# Patient Record
Sex: MALE | Race: WHITE | HISPANIC OR LATINO | Employment: OTHER | URBAN - METROPOLITAN AREA
[De-identification: names, ages, dates, MRNs, and addresses within clinical notes are randomized per-mention and may not be internally consistent; named-entity substitution may affect disease eponyms.]

---

## 2017-01-10 ENCOUNTER — GENERIC CONVERSION - ENCOUNTER (OUTPATIENT)
Dept: OTHER | Facility: OTHER | Age: 67
End: 2017-01-10

## 2017-04-03 ENCOUNTER — ALLSCRIPTS OFFICE VISIT (OUTPATIENT)
Dept: OTHER | Facility: OTHER | Age: 67
End: 2017-04-03

## 2017-04-03 DIAGNOSIS — I10 ESSENTIAL (PRIMARY) HYPERTENSION: ICD-10-CM

## 2017-04-03 DIAGNOSIS — Z12.5 ENCOUNTER FOR SCREENING FOR MALIGNANT NEOPLASM OF PROSTATE: ICD-10-CM

## 2017-04-03 DIAGNOSIS — Z13.89 ENCOUNTER FOR SCREENING FOR OTHER DISORDER: ICD-10-CM

## 2017-06-21 ENCOUNTER — ALLSCRIPTS OFFICE VISIT (OUTPATIENT)
Dept: OTHER | Facility: OTHER | Age: 67
End: 2017-06-21

## 2017-06-21 DIAGNOSIS — M25.50 PAIN IN JOINT: ICD-10-CM

## 2017-06-29 ENCOUNTER — APPOINTMENT (OUTPATIENT)
Dept: LAB | Facility: CLINIC | Age: 67
End: 2017-06-29
Payer: COMMERCIAL

## 2017-06-29 ENCOUNTER — TRANSCRIBE ORDERS (OUTPATIENT)
Dept: LAB | Facility: CLINIC | Age: 67
End: 2017-06-29

## 2017-06-29 DIAGNOSIS — I10 ESSENTIAL (PRIMARY) HYPERTENSION: ICD-10-CM

## 2017-06-29 DIAGNOSIS — Z12.5 ENCOUNTER FOR SCREENING FOR MALIGNANT NEOPLASM OF PROSTATE: ICD-10-CM

## 2017-06-29 DIAGNOSIS — M25.50 PAIN IN JOINT, SITE UNSPECIFIED: ICD-10-CM

## 2017-06-29 DIAGNOSIS — M25.50 PAIN IN JOINT: ICD-10-CM

## 2017-06-29 DIAGNOSIS — Z13.89 ENCOUNTER FOR SCREENING FOR OTHER DISORDER: ICD-10-CM

## 2017-06-29 DIAGNOSIS — M25.50 PAIN IN JOINT, SITE UNSPECIFIED: Primary | ICD-10-CM

## 2017-06-29 LAB
ALBUMIN SERPL BCP-MCNC: 3.5 G/DL (ref 3.5–5)
ALP SERPL-CCNC: 67 U/L (ref 46–116)
ALT SERPL W P-5'-P-CCNC: 27 U/L (ref 12–78)
ANION GAP SERPL CALCULATED.3IONS-SCNC: 7 MMOL/L (ref 4–13)
AST SERPL W P-5'-P-CCNC: 20 U/L (ref 5–45)
BACTERIA UR QL AUTO: ABNORMAL /HPF
BASOPHILS # BLD AUTO: 0.02 THOUSANDS/ΜL (ref 0–0.1)
BASOPHILS NFR BLD AUTO: 0 % (ref 0–1)
BILIRUB SERPL-MCNC: 0.61 MG/DL (ref 0.2–1)
BILIRUB UR QL STRIP: NEGATIVE
BUN SERPL-MCNC: 16 MG/DL (ref 5–25)
CALCIUM SERPL-MCNC: 9 MG/DL (ref 8.3–10.1)
CHLORIDE SERPL-SCNC: 108 MMOL/L (ref 100–108)
CHOLEST SERPL-MCNC: 252 MG/DL (ref 50–200)
CLARITY UR: CLEAR
CO2 SERPL-SCNC: 23 MMOL/L (ref 21–32)
COLOR UR: YELLOW
CREAT SERPL-MCNC: 1.33 MG/DL (ref 0.6–1.3)
CRP SERPL QL: 11.9 MG/L
EOSINOPHIL # BLD AUTO: 0.47 THOUSAND/ΜL (ref 0–0.61)
EOSINOPHIL NFR BLD AUTO: 8 % (ref 0–6)
ERYTHROCYTE [DISTWIDTH] IN BLOOD BY AUTOMATED COUNT: 12.5 % (ref 11.6–15.1)
ERYTHROCYTE [SEDIMENTATION RATE] IN BLOOD: 21 MM/HOUR (ref 0–10)
GFR SERPL CREATININE-BSD FRML MDRD: 53.6 ML/MIN/1.73SQ M
GLUCOSE P FAST SERPL-MCNC: 93 MG/DL (ref 65–99)
GLUCOSE UR STRIP-MCNC: NEGATIVE MG/DL
HCT VFR BLD AUTO: 44.5 % (ref 36.5–49.3)
HDLC SERPL-MCNC: 49 MG/DL (ref 40–60)
HGB BLD-MCNC: 15.4 G/DL (ref 12–17)
HGB UR QL STRIP.AUTO: NEGATIVE
HYALINE CASTS #/AREA URNS LPF: ABNORMAL /LPF
KETONES UR STRIP-MCNC: NEGATIVE MG/DL
LDLC SERPL CALC-MCNC: 164 MG/DL (ref 0–100)
LEUKOCYTE ESTERASE UR QL STRIP: ABNORMAL
LYMPHOCYTES # BLD AUTO: 1.56 THOUSANDS/ΜL (ref 0.6–4.47)
LYMPHOCYTES NFR BLD AUTO: 26 % (ref 14–44)
MAGNESIUM SERPL-MCNC: 2.3 MG/DL (ref 1.6–2.6)
MCH RBC QN AUTO: 30.8 PG (ref 26.8–34.3)
MCHC RBC AUTO-ENTMCNC: 34.6 G/DL (ref 31.4–37.4)
MCV RBC AUTO: 89 FL (ref 82–98)
MONOCYTES # BLD AUTO: 0.64 THOUSAND/ΜL (ref 0.17–1.22)
MONOCYTES NFR BLD AUTO: 11 % (ref 4–12)
NEUTROPHILS # BLD AUTO: 3.37 THOUSANDS/ΜL (ref 1.85–7.62)
NEUTS SEG NFR BLD AUTO: 55 % (ref 43–75)
NITRITE UR QL STRIP: NEGATIVE
NON-SQ EPI CELLS URNS QL MICRO: ABNORMAL /HPF
NRBC BLD AUTO-RTO: 0 /100 WBCS
PH UR STRIP.AUTO: 5.5 [PH] (ref 4.5–8)
PLATELET # BLD AUTO: 235 THOUSANDS/UL (ref 149–390)
PMV BLD AUTO: 10.4 FL (ref 8.9–12.7)
POTASSIUM SERPL-SCNC: 3.5 MMOL/L (ref 3.5–5.3)
PROT SERPL-MCNC: 7.7 G/DL (ref 6.4–8.2)
PROT UR STRIP-MCNC: NEGATIVE MG/DL
PSA SERPL-MCNC: 2.2 NG/ML (ref 0–4)
RBC # BLD AUTO: 5 MILLION/UL (ref 3.88–5.62)
RBC #/AREA URNS AUTO: ABNORMAL /HPF
SODIUM SERPL-SCNC: 138 MMOL/L (ref 136–145)
SP GR UR STRIP.AUTO: 1.01 (ref 1–1.03)
TRIGL SERPL-MCNC: 197 MG/DL
TSH SERPL DL<=0.05 MIU/L-ACNC: 0.54 UIU/ML (ref 0.36–3.74)
UROBILINOGEN UR QL STRIP.AUTO: 0.2 E.U./DL
WBC # BLD AUTO: 6.07 THOUSAND/UL (ref 4.31–10.16)
WBC #/AREA URNS AUTO: ABNORMAL /HPF

## 2017-06-29 PROCEDURE — 83735 ASSAY OF MAGNESIUM: CPT

## 2017-06-29 PROCEDURE — 84443 ASSAY THYROID STIM HORMONE: CPT

## 2017-06-29 PROCEDURE — 80061 LIPID PANEL: CPT

## 2017-06-29 PROCEDURE — 86140 C-REACTIVE PROTEIN: CPT

## 2017-06-29 PROCEDURE — G0103 PSA SCREENING: HCPCS

## 2017-06-29 PROCEDURE — 85652 RBC SED RATE AUTOMATED: CPT

## 2017-06-29 PROCEDURE — 81001 URINALYSIS AUTO W/SCOPE: CPT

## 2017-06-29 PROCEDURE — 86430 RHEUMATOID FACTOR TEST QUAL: CPT

## 2017-06-29 PROCEDURE — 86038 ANTINUCLEAR ANTIBODIES: CPT

## 2017-06-29 PROCEDURE — 85025 COMPLETE CBC W/AUTO DIFF WBC: CPT

## 2017-06-29 PROCEDURE — 36415 COLL VENOUS BLD VENIPUNCTURE: CPT

## 2017-06-29 PROCEDURE — 80053 COMPREHEN METABOLIC PANEL: CPT

## 2017-06-30 LAB
RHEUMATOID FACT SER QL LA: NEGATIVE
RYE IGE QN: NEGATIVE

## 2017-07-05 ENCOUNTER — ALLSCRIPTS OFFICE VISIT (OUTPATIENT)
Dept: OTHER | Facility: OTHER | Age: 67
End: 2017-07-05

## 2017-10-04 ENCOUNTER — GENERIC CONVERSION - ENCOUNTER (OUTPATIENT)
Dept: OTHER | Facility: OTHER | Age: 67
End: 2017-10-04

## 2017-10-23 ENCOUNTER — GENERIC CONVERSION - ENCOUNTER (OUTPATIENT)
Dept: OTHER | Facility: OTHER | Age: 67
End: 2017-10-23

## 2017-11-03 ENCOUNTER — ALLSCRIPTS OFFICE VISIT (OUTPATIENT)
Dept: OTHER | Facility: OTHER | Age: 67
End: 2017-11-03

## 2017-11-03 DIAGNOSIS — G62.9 POLYNEUROPATHY: ICD-10-CM

## 2017-11-04 NOTE — CONSULTS
Assessment  1  Restless leg syndrome (333 94) (G25 81)   2  Neuropathy, peripheral (356 9) (G62 9)   3  Tinnitus of both ears (388 30) (H93 13)   4  Memory difficulty (780 93) (R41 3)    Plan  Neuropathy, peripheral    · (1) FOLATE; Status:Active; Requested EHS:[id number];    Perform:[facility]; TMO:[id number]; Ordered;For:Neuropathy, peripheral; Ordered By:Mandeep Piper;   · (1) LYME ANTIBODY PROFILE W/REFLEX TO WESTERN BLOT; Status:Active; Requested  AAZ:[id number];    Perform:[facility]; JLA:52QNK8355; Ordered;For:Neuropathy, peripheral; Ordered By:Mandeep Piper;   · (1) VITAMIN B12; Status:Active; Requested JRW:[id number];    Perform:[facility]; PIR:56PTG0389; Ordered;For:Neuropathy, peripheral; Ordered By:Caitlin Piper; Discussion/Summary  Discussion Summary:   Suspect Berta Lugo has restless leg syndrome  I have recommended a trial of gabapentin 300 mg at bedtime  We discussed the potential adverse effects of the medication  He does have findings consistent with peripheral neuropathy and probable carpal tunnel syndrome  Will obtain EMG of the upper extremities as well as the right lower extremity and well obtain blood work to rule out treatable etiologies (he does have MGUS which can cause peripheral neuropathy)  He may try Lipoflavinoid for his denied this  He is presently being treated with Aricept for his memory issues  I will see him back in follow up on these are completed  Chief Complaint  Chief Complaint Free Text Note Form: Patient is a 79year old right handed gentleman referred by Dr Shai Chu for Bilateral leg pain  History of Present Illness  HPI: Michael Joiner presents today with the above complaints  He states that for several years he has had symptoms of pain in his lower extremities below the knees  He states it is sharp type of pain that is typically most severe at nighttime while he is trying to sleep   He states that he constantly has to keep his legs moving to minimize the pain  He states he is aware but to some degree during the day but because he is active he does not really notice it as much  He states that has gotten worse over time  He states he has not been evaluated about 4 it before and is currently not on any medication for it  He denies any pain in the proximal lower extremities or pain in his back  He states in addition he notes numbness and tingling predominantly in his hands  He does not notice any specific position or activity that seems to aggravate it  He denies any pain in his neck or pain radiating from his neck into his arms  He also notices a buzzing in his ears bilaterally  Review of Systems  Neurological ROS:   Constitutional: recent weight gain-- and-- fatigue  HEENT: blurred vision,-- eye pain-- and-- hoarseness  Cardiovascular: chest pain or pressure-- and-- rapid or irregular heart rate  Respiratory: shortness of breath with or without exertion  Gastrointestinal: changes in bowel habits  Genitourinary:  no incontinence, no feelings of urinary urgency, no increase in frequency, no urinary hesitancy, no dysuria, no hematuria  Musculoskeletal: arthralgias,-- myalgias-- and-- immobility or loss of function  Integumentary  no masses, no rash, no skin lesions, no livedo reticularis  Psychiatric: depression-- and-- mood swings  Endocrine  no unusual weight loss or gain, no excessive urination, no excessive thirst, no hair loss or gain, no hot or cold intolerance, no menstrual period change or irregularity, no loss of sexual ability or drive, no erection difficulty, no nipple discharge  Hematologic/Lymphatic:  no unusual bleeding, no tendency for easy bruising, no clotting skin or lumps  Neurological General: headache,-- trouble falling asleep,-- snoring-- and-- waking up at night  Neurological Mental Status: memory problems     Neurological Cranial Nerves:  no blurry or double vision, no loss of vision, no face drooping, no facial numbness or weakness, no taste or smell loss/changes, no hearing loss or ringing, no vertigo or dizziness, no dysphagia, no slurred speech  Neurological Motor findings include: cramping(pre/post exercise)  Neurological Coordination:  no unsteadiness, no vertigo or dizziness, no clumsiness, no problems reaching for objects  Neurological Sensory:  no numbness, no pain, no tingling, does not fall when eyes closed or taking a shower  Neurological Gait:  no difficulty walking, not falling to one side, no sensation of being pushed, has not had falls  ROS Reviewed:   ROS reviewed  Active Problems  1  Anxiety associated with depression (300 4) (F41 8)   2  Chest wall pain (786 52) (R07 89)   3  Chronic obstructive pulmonary disease (496) (J44 9)   4  Congestive heart failure (428 0) (I50 9)   5  Coronary artery disease involving native coronary artery of native heart with other form of   angina pectoris (414 01,413 9) (I25 118)   6  Dyspnea on effort (786 09) (R06 09)   7  Flu vaccine need (V04 81) (Z23)   8  High cholesterol (272 0) (E78 00)   9  Hypertension (401 9) (I10)   10  Insomnia (780 52) (G47 00)   11  Joint pain (719 40) (M25 50)   12  Lumbosacral spinal stenosis (724 02) (M48 07)   13  MGUS (monoclonal gammopathy of unknown significance) (273 1) (D47 2)   14  No advance directives (V49 89) (Z78 9)   15  History of Nonischemic cardiomyopathy (425 4) (I42 8)   16  Obesity (278 00) (E66 9)   17  Pain in both upper arms (729 5) (M79 621,M79 622)   18  Prostate cancer screening (V76 44) (Z12 5)   19  Restless leg syndrome (333 94) (G25 81)   20  Screening for colon cancer (V76 51) (Z12 11)   21  Screening for genitourinary condition (V81 6) (Z13 89)    Past Medical History  1  History of Nonischemic cardiomyopathy (425 4) (I42 8)  Active Problems And Past Medical History Reviewed: The active problems and past medical history were reviewed and updated today        Surgical History  Surgical History Reviewed: The surgical history was reviewed and updated today  Family History  Family History Reviewed: The family history was reviewed and updated today  Social History   · Alcohol drinker   · Disabled   · Former smoker (Y15 44) (M25 604)   · No advance directives (V49 89) (Z78 9)   · No drug use  Social History Reviewed: The social history was reviewed and updated today  Current Meds   1  AmLODIPine Besylate 10 MG Oral Tablet; TAKE ONE TABLET BY MOUTH ONCE DAILY; Therapy: 25Nej9898 to (Evaluate:96Vhi2074)  Requested for: 72Gbd9284; Last   Rx:40Ahs4255 Ordered   2  AmLODIPine Besylate 5 MG Oral Tablet; take 1 tablet daily for blood pressure; Therapy: 17CCP0368 to (Evaluate:81Vih8671)  Requested for: 07Aug2017; Last   Rx:68Sle9011 Ordered   3  Aspirin 81 MG TABS; TAKE 1 TABLET DAILY; Therapy: 90NEG3830 to (Evaluate:21Nov2015); Last Rx:33Qbn2717 Ordered   4  Carvedilol 12 5 MG Oral Tablet; take one tablet by mouth twice daily; Therapy: 30BGN0966 to (Evaluate:02Apr2018)  Requested for: 07Apr2017; Last   Rx:07Apr2017 Ordered   5  Citalopram Hydrobromide 20 MG Oral Tablet; TAKE ONE TABLET BY MOUTH ONCE   DAILY; Therapy: 92OPV6202 to (Evaluate:63Zan9735)  Requested for: 12Jun2017; Last   Rx:12Jun2017 Ordered   6  Donepezil HCl - 10 MG Oral Tablet; TAKE 1 TABLET DAILY AS DIRECTED; Therapy: 92UUC4099 to (Evaluate:33Zuo2382)  Requested for: 25Oct2017; Last   Rx:25Oct2017 Ordered   7  Fluticasone Propionate 50 MCG/ACT Nasal Suspension; USE 2 SPRAYS IN EACH   NOSTRIL ONCE DAILY; Therapy: 89QNB8218 to (Last YR:62CPL0173)  Requested for: 31XGC4177 Ordered   8  Furosemide 20 MG Oral Tablet; TAKE 1 TABLET DAILY AS DIRECTED; Therapy: 07CRM9749 to (Evaluate:82Ugh0204)  Requested for: 38Wtm5953; Last   Rx:09Afr7406 Ordered   9  Isosorbide Mononitrate ER 30 MG Oral Tablet Extended Release 24 Hour; TAKE 1   TABLET DAILY;    Therapy: 51HTO8605 to (Evaluate:36Omf6647)  Requested for: 74QKJ4278; Last   Rx:04Oct2017 Ordered   10  Lisinopril 20 MG Oral Tablet; TAKE ONE TABLET BY MOUTH ONCE DAILY; Therapy: 46UET9733 to (Anatoly Barcenas)  Requested for: 34Caz5809; Last    Rx:25Ezh3789 Ordered   11  Montelukast Sodium 10 MG Oral Tablet; take one tablet by mouth daily; Therapy: 72AIW4592 to (Evaluate:86Mvw8535)  Requested for: 03ASZ7196; Last    Rx:15Ruq8403 Ordered   12  Nitrostat 0 4 MG Sublingual Tablet Sublingual; DISSOLVE 1 TABLET UNDER THE    TONGUE AS NEEDED FOR CHEST PAIN;    Therapy: (Holland Boeck) to Recorded   13  Potassium Chloride ER 10 MEQ Oral Tablet Extended Release; TAKE 1 TABLET TWICE    DAILY  Requested for: 76JUK7670; Last Rx:98Qav1831 Ordered   14  Pravastatin Sodium 40 MG Oral Tablet; TAKE 1 TABLET DAILY AT BEDTIME; Last    Rx:63Pah3971 Ordered   15  ProAir  (90 Base) MCG/ACT Inhalation Aerosol Solution; INHALE 1 TO 2 PUFFS    EVERY 4 TO 6 HOURS AS NEEDED Recorded   16  ROPINIRole HCl - 0 5 MG Oral Tablet; 1 tab at hs;    Therapy: 39FOT4902 to (Last YO:55ZOO4798)  Requested for: 80TFB0522 Ordered  Medication List Reviewed: The medication list was reviewed and updated today  Allergies  1  No Known Drug Allergies  2  Seasonal    Vitals  Signs   Recorded: 97UCZ8542 08:20AM   Heart Rate: 70  Systolic: 954, LUE, Sitting  Diastolic: 80, LUE, Sitting  Height: 5 ft 5 5 in  Weight: 201 lb 2 oz  BMI Calculated: 32 96  BSA Calculated: 1 99  Pain Scale: 7    Physical Exam    GENERAL:  Cooperative in no acute distress  Well-developed and well-nourished    HEAD and NECK   Head is atraumatic normocephalic with no lesions or masses  Neck is supple with full range of motion    CARDIOVASCULAR  Carotid Arteries-no carotid bruits  NEUROLOGIC:  Mental Status-the patient is awake alert and oriented without aphasia or apraxia  Was able to spell world backwards correctly    Was able to recall 3 of 3 objects immediately and 1 of 3 objects after a few minutes  Cranial Nerves: Visual fields are full to confrontation  Discs are flat  Extraocular movements are full without nystagmus  Pupils are 2-1/2 mm and reactive  Face is symmetrical to light touch  Movements of facial expression move symmetrically  Hearing is normal to finger rub bilaterally  Soft palate lifts symmetrically  Shoulder shrug is symmetrical  Tongue is midline without atrophy  Motor: No drift is noted on arm extension  Strength is full in the upper and lower extremities with normal bulk and tone  Sensory:  Diminished temperature and vibratory sensation in the distal lower extremities bilaterally  Cortical function is intact  Coordination: Finger to nose testing is performed accurately  Romberg is negative  Gait reveals a normal base with symmetrical arm swing  Tandem walk is normal   Reflexes:  Trace to 1 in the biceps, triceps, brachioradialis and knee jerk regions, absent at the ankles Toes are downgoing         Future Appointments    Date/Time Provider Specialty Site   03/15/2018 11:15 AM KADI Wilcox  [address] [facility]   12/14/2017 09:05 KADI Tucker   Cardiology [address]     Signatures   Electronically signed by : Marylee Artist, MD; Nov  3 2017  8:46AM EST                       (Author)

## 2018-01-11 NOTE — RESULT NOTES
Verified Results  ECHO COMPLETE WITH CONTRAST IF INDICATED 2016 09:18AM Carlton Teague Order Number: [id number]    - Patient Instructions: To schedule this appointment, please contact Central Scheduling at [phone]  For [facility], please call [phone]  Test Name Result Flag Reference   ECHO COMPLETE WITH CONTRAST IF INDICATED (Report)     [facility], [address] [place] [address]   [phone]     Transthoracic Echocardiogram   Limited 2D, M-mode, and Color Doppler     Study date: 2016     Patient: Jaquelin Reyes   MR number: [id number]   Account number: [de-identified]   : 1950   Age: 77 years   Gender: Male   Status: Outpatient   Location: [facility]   Height: 65 in   Weight: 203 lb   BP: 152/ 86 mmHg     Indications: Dyspnea  Diagnoses: R06 09 - Other forms of dyspnea     Sonographer: Cope RCS   Primary Physician: Nighat Barksdale MD   Referring Physician: Ramírez Arcos MD   Group: [facility]   Interpreting Physician: Ramírez Arcos MD     SUMMARY     LEFT VENTRICLE:   Ejection fraction was estimated to be 55 %  Although no diagnostic regional wall motion abnormality was identified, this   possibility cannot be completely excluded on the basis of this study  There was   an increased relative contribution of atrial contraction to ventricular filling  MITRAL VALVE:   There was trace regurgitation  TRICUSPID VALVE:   There was trace regurgitation  PULMONIC VALVE:   There was trace regurgitation  HISTORY: PRIOR HISTORY: Former smoker  Cardiomyopathy  Risk factors:   hypertension and medication-treated hypercholesterolemia  Chronic lung disease  PROCEDURE: The study was performed in the [address] [place] [address]  This   was a routine study  The transthoracic approach was used  The study included   limited 2D imaging, M-mode, and color Doppler   The heart rate was 56 bpm, at   the start of the study  Images were obtained from the parasternal, apical,   subcostal, and suprasternal notch acoustic windows  Image quality was adequate  LEFT VENTRICLE: Size was normal  Ejection fraction was estimated to be 55 %  Although no diagnostic regional wall motion abnormality was identified, this   possibility cannot be completely excluded on the basis of this study  DOPPLER:   There was an increased relative contribution of atrial contraction to   ventricular filling  RIGHT VENTRICLE: The size was normal  Systolic function was normal  Wall   thickness was normal      LEFT ATRIUM: Size was normal      RIGHT ATRIUM: Size was normal      MITRAL VALVE: Valve structure was normal  There was normal leaflet separation  DOPPLER: The transmitral velocity was within the normal range  There was no   evidence for stenosis  There was trace regurgitation  AORTIC VALVE: The valve was trileaflet  Leaflets exhibited mild calcification  DOPPLER: There was no evidence for stenosis  TRICUSPID VALVE: The valve structure was normal  There was normal leaflet   separation  DOPPLER: The transtricuspid velocity was within the normal range  There was no evidence for stenosis  There was trace regurgitation  PULMONIC VALVE: Leaflets exhibited normal thickness, no calcification, and   normal cuspal separation  DOPPLER: The transpulmonic velocity was within the   normal range  There was trace regurgitation  PERICARDIUM: There was no pericardial effusion  The pericardium was normal in   appearance  AORTA: The root exhibited normal size  SYSTEM MEASUREMENT TABLES     Apical four chamber   4 chamber Left Atrium Volume Index; Planimetry; End Systole; Apical four   chamber;: 13 3 cm2 Left Ventricular Diastolic Area; Method of Disks, Single   Plane; End Diastole; Apical four chamber;: 29 95 cm2 Left Ventricular Ejection   Fraction; Method of Disks, Single Plane;  Apical four chamber;: 71 9 % Left   Ventricular systolic Area; Method of Disks, Single Plane; End Systole; Apical   four chamber;: 13 66 cm2 Right Atrium Systolic Area; Planimetry; End Systole; Apical four chamber;: 12 76 cm2 Right Ventricular Internal Diastolic Dimension; End Diastole; Apical four chamber;: 27 9 mm   TAPSE: 21 7 mm     Apical two chamber   Left Ventricular Diastolic Area; Method of Disks, Single Plane; End Diastole; Apical two chamber;: 33 cm2 Left Ventricular Ejection Fraction; Method of   Disks, Single Plane; Apical two chamber;: 52 8 % Left Ventricular systolic   Area; Method of Disks, Single Plane; End Systole; Apical two chamber;: 19 73 cm2     Unspecified Scan Mode   Aortic Root Diameter; End Systole;: 30 7 mm   Aortic valve Area; Continuity Equation by Velocity Time Integral; Systole;:   1 68 cm2   Cardiovascular Orifice Diameter; End Systole;: 23 4 mm   Gradient Pressure, Peak; Simplified Bernoulli; Antegrade Flow; Systole;: 15 5   mm[Hg] Gradient pressure, average; Simplified Bernoulli; Antegrade Flow;   Systole;: 7 4 mm[Hg]   Left Atrium to Aortic Root Ratio;: 1 32   Left atrial diameter; End Diastole;: 40 5 mm   Cardiac Output; Method of Disks, Biplane; Systole;: 4 29 L/min   Interventricular Septum Diastolic Thickness; Teichholz; End Diastole;: 8 3 mm   LV_LDiff_d_BPMOD; End Diastole;: -6 6 %   LV_LDiff_s_BPMOD; End Systole;: -6 %   Left Ventricle Internal End Diastolic Dimension; Teichholz;: 53 9 mm   Left Ventricle Internal Systolic Dimension; Teichholz; End Systole;: 32 9 mm   Left Ventricle Posterior Wall Diastolic Thickness; Teichholz; End Diastole;:   9 2 mm   Left Ventricular Ejection Fraction; Method of Disks, Biplane;: 63 7 %   Left Ventricular Ejection Fraction; Teichholz;: 56 3 %   Left Ventricular End Diastolic Volume; Method of Disks, Biplane;: 108 6 ml   Left Ventricular End Systolic Volume; Method of Disks, Biplane;: 39 4 ml   Mean Gradient; Simplified Bernoulli;  Antegrade Flow; Systole;: 1 3 mm[Hg]   Peak Gradient; Simplified Bernoulli;  Antegrade Flow; Systole;: 2 mm[Hg]   Stroke volume; Method of Disks, Biplane; Systole;: 69 2 ml   Mitral Valve Area; Area by Pressure Half-Time; Systole;: 3 61 cm2   Mitral Valve E to A Ratio; Systole;: 0 56   Pressure half time; Diastole;: 0 06 s     [address] Accredited [facility]     Prepared and electronically signed by     Giovanni Spivey MD   Signed [id number] 12:26:19

## 2018-01-12 VITALS
WEIGHT: 207.13 LBS | DIASTOLIC BLOOD PRESSURE: 84 MMHG | SYSTOLIC BLOOD PRESSURE: 154 MMHG | HEART RATE: 77 BPM | OXYGEN SATURATION: 95 %

## 2018-01-13 VITALS
OXYGEN SATURATION: 94 % | DIASTOLIC BLOOD PRESSURE: 96 MMHG | HEIGHT: 66 IN | HEART RATE: 83 BPM | BODY MASS INDEX: 32.54 KG/M2 | WEIGHT: 202.5 LBS | SYSTOLIC BLOOD PRESSURE: 158 MMHG

## 2018-01-13 VITALS
HEIGHT: 66 IN | SYSTOLIC BLOOD PRESSURE: 126 MMHG | HEART RATE: 66 BPM | BODY MASS INDEX: 32.47 KG/M2 | WEIGHT: 202 LBS | DIASTOLIC BLOOD PRESSURE: 78 MMHG | OXYGEN SATURATION: 96 %

## 2018-01-13 VITALS
BODY MASS INDEX: 32.32 KG/M2 | DIASTOLIC BLOOD PRESSURE: 80 MMHG | HEART RATE: 70 BPM | SYSTOLIC BLOOD PRESSURE: 144 MMHG | WEIGHT: 201.13 LBS | HEIGHT: 66 IN

## 2018-01-22 VITALS — TEMPERATURE: 97.6 F

## 2018-02-16 RX ORDER — AMLODIPINE BESYLATE 5 MG/1
1 TABLET ORAL DAILY
COMMUNITY
Start: 2015-10-19 | End: 2018-10-06 | Stop reason: SDUPTHER

## 2018-02-16 RX ORDER — CITALOPRAM 20 MG/1
1 TABLET ORAL DAILY
COMMUNITY
Start: 2017-06-12 | End: 2018-11-07 | Stop reason: SDUPTHER

## 2018-02-16 RX ORDER — POTASSIUM CHLORIDE 750 MG/1
1 TABLET, FILM COATED, EXTENDED RELEASE ORAL 2 TIMES DAILY
COMMUNITY
End: 2019-01-16 | Stop reason: SDUPTHER

## 2018-02-16 RX ORDER — FUROSEMIDE 20 MG/1
1 TABLET ORAL DAILY
COMMUNITY
Start: 2017-06-05 | End: 2021-04-05 | Stop reason: SDUPTHER

## 2018-02-16 RX ORDER — CARVEDILOL 12.5 MG/1
1 TABLET ORAL 2 TIMES DAILY
COMMUNITY
Start: 2016-02-16 | End: 2018-11-15 | Stop reason: SDUPTHER

## 2018-02-16 RX ORDER — MONTELUKAST SODIUM 10 MG/1
1 TABLET ORAL DAILY
COMMUNITY
Start: 2015-10-22 | End: 2018-11-07 | Stop reason: SDUPTHER

## 2018-02-16 RX ORDER — NITROGLYCERIN 0.4 MG/1
1 TABLET SUBLINGUAL
COMMUNITY
End: 2021-04-05 | Stop reason: SDUPTHER

## 2018-02-16 RX ORDER — FLUTICASONE PROPIONATE 50 MCG
2 SPRAY, SUSPENSION (ML) NASAL DAILY
COMMUNITY
Start: 2016-05-20 | End: 2018-11-07 | Stop reason: SDUPTHER

## 2018-02-16 RX ORDER — LISINOPRIL 20 MG/1
1 TABLET ORAL DAILY
COMMUNITY
Start: 2015-10-19 | End: 2018-06-19 | Stop reason: SDUPTHER

## 2018-02-16 RX ORDER — PRAVASTATIN SODIUM 40 MG
1 TABLET ORAL
COMMUNITY
End: 2018-03-17

## 2018-02-16 RX ORDER — DONEPEZIL HYDROCHLORIDE 10 MG/1
1 TABLET, FILM COATED ORAL DAILY
COMMUNITY
Start: 2015-10-29 | End: 2020-03-30

## 2018-02-16 RX ORDER — AMLODIPINE BESYLATE 10 MG/1
1 TABLET ORAL DAILY
COMMUNITY
Start: 2017-08-07 | End: 2018-02-17 | Stop reason: SDUPTHER

## 2018-02-16 RX ORDER — ALBUTEROL SULFATE 90 UG/1
1-2 AEROSOL, METERED RESPIRATORY (INHALATION)
COMMUNITY
End: 2018-11-07 | Stop reason: SDUPTHER

## 2018-02-16 RX ORDER — ISOSORBIDE MONONITRATE 30 MG/1
1 TABLET, EXTENDED RELEASE ORAL DAILY
COMMUNITY
Start: 2017-10-04 | End: 2020-03-04 | Stop reason: ALTCHOICE

## 2018-02-16 RX ORDER — ROPINIROLE 0.5 MG/1
1 TABLET, FILM COATED ORAL
COMMUNITY
Start: 2017-10-26 | End: 2018-05-21 | Stop reason: SDUPTHER

## 2018-02-17 ENCOUNTER — OFFICE VISIT (OUTPATIENT)
Dept: INTERNAL MEDICINE CLINIC | Facility: CLINIC | Age: 68
End: 2018-02-17
Payer: MEDICARE

## 2018-02-17 ENCOUNTER — APPOINTMENT (OUTPATIENT)
Dept: LAB | Facility: CLINIC | Age: 68
End: 2018-02-17
Payer: MEDICARE

## 2018-02-17 VITALS
HEIGHT: 65 IN | BODY MASS INDEX: 32.82 KG/M2 | TEMPERATURE: 97.1 F | SYSTOLIC BLOOD PRESSURE: 150 MMHG | DIASTOLIC BLOOD PRESSURE: 90 MMHG | HEART RATE: 88 BPM | WEIGHT: 197 LBS

## 2018-02-17 DIAGNOSIS — R05.9 COUGH: ICD-10-CM

## 2018-02-17 DIAGNOSIS — M79.10 MYALGIA: Primary | ICD-10-CM

## 2018-02-17 DIAGNOSIS — M79.10 MYALGIA: ICD-10-CM

## 2018-02-17 LAB
ALBUMIN SERPL BCP-MCNC: 3.2 G/DL (ref 3.5–5)
ALP SERPL-CCNC: 74 U/L (ref 46–116)
ALT SERPL W P-5'-P-CCNC: 39 U/L (ref 12–78)
ANION GAP SERPL CALCULATED.3IONS-SCNC: 6 MMOL/L (ref 4–13)
AST SERPL W P-5'-P-CCNC: 26 U/L (ref 5–45)
BACTERIA UR QL AUTO: ABNORMAL /HPF
BASOPHILS # BLD AUTO: 0.02 THOUSANDS/ΜL (ref 0–0.1)
BASOPHILS NFR BLD AUTO: 0 % (ref 0–1)
BILIRUB SERPL-MCNC: 0.72 MG/DL (ref 0.2–1)
BILIRUB UR QL STRIP: NEGATIVE
BUN SERPL-MCNC: 23 MG/DL (ref 5–25)
CALCIUM SERPL-MCNC: 8.6 MG/DL (ref 8.3–10.1)
CHLORIDE SERPL-SCNC: 110 MMOL/L (ref 100–108)
CHOLEST SERPL-MCNC: 258 MG/DL (ref 50–200)
CLARITY UR: CLEAR
CO2 SERPL-SCNC: 25 MMOL/L (ref 21–32)
COLOR UR: YELLOW
CREAT SERPL-MCNC: 1.18 MG/DL (ref 0.6–1.3)
EOSINOPHIL # BLD AUTO: 0.76 THOUSAND/ΜL (ref 0–0.61)
EOSINOPHIL NFR BLD AUTO: 11 % (ref 0–6)
ERYTHROCYTE [DISTWIDTH] IN BLOOD BY AUTOMATED COUNT: 12.8 % (ref 11.6–15.1)
ERYTHROCYTE [SEDIMENTATION RATE] IN BLOOD: 32 MM/HOUR (ref 0–10)
EST. AVERAGE GLUCOSE BLD GHB EST-MCNC: 114 MG/DL
GFR SERPL CREATININE-BSD FRML MDRD: 63 ML/MIN/1.73SQ M
GLUCOSE P FAST SERPL-MCNC: 98 MG/DL (ref 65–99)
GLUCOSE UR STRIP-MCNC: NEGATIVE MG/DL
HBA1C MFR BLD: 5.6 % (ref 4.2–6.3)
HCT VFR BLD AUTO: 45.9 % (ref 36.5–49.3)
HDLC SERPL-MCNC: 47 MG/DL (ref 40–60)
HGB BLD-MCNC: 15.7 G/DL (ref 12–17)
HGB UR QL STRIP.AUTO: NEGATIVE
HYALINE CASTS #/AREA URNS LPF: ABNORMAL /LPF
KETONES UR STRIP-MCNC: NEGATIVE MG/DL
LDLC SERPL CALC-MCNC: 159 MG/DL (ref 0–100)
LEUKOCYTE ESTERASE UR QL STRIP: NEGATIVE
LYMPHOCYTES # BLD AUTO: 1.73 THOUSANDS/ΜL (ref 0.6–4.47)
LYMPHOCYTES NFR BLD AUTO: 25 % (ref 14–44)
MCH RBC QN AUTO: 30.1 PG (ref 26.8–34.3)
MCHC RBC AUTO-ENTMCNC: 34.2 G/DL (ref 31.4–37.4)
MCV RBC AUTO: 88 FL (ref 82–98)
MONOCYTES # BLD AUTO: 0.79 THOUSAND/ΜL (ref 0.17–1.22)
MONOCYTES NFR BLD AUTO: 12 % (ref 4–12)
NEUTROPHILS # BLD AUTO: 3.57 THOUSANDS/ΜL (ref 1.85–7.62)
NEUTS SEG NFR BLD AUTO: 52 % (ref 43–75)
NITRITE UR QL STRIP: NEGATIVE
NON-SQ EPI CELLS URNS QL MICRO: ABNORMAL /HPF
NRBC BLD AUTO-RTO: 0 /100 WBCS
PH UR STRIP.AUTO: 5.5 [PH] (ref 4.5–8)
PLATELET # BLD AUTO: 285 THOUSANDS/UL (ref 149–390)
PMV BLD AUTO: 10.4 FL (ref 8.9–12.7)
POTASSIUM SERPL-SCNC: 3.9 MMOL/L (ref 3.5–5.3)
PROT SERPL-MCNC: 7.8 G/DL (ref 6.4–8.2)
PROT UR STRIP-MCNC: ABNORMAL MG/DL
RBC # BLD AUTO: 5.21 MILLION/UL (ref 3.88–5.62)
RBC #/AREA URNS AUTO: ABNORMAL /HPF
SODIUM SERPL-SCNC: 141 MMOL/L (ref 136–145)
SP GR UR STRIP.AUTO: 1.03 (ref 1–1.03)
TRIGL SERPL-MCNC: 260 MG/DL
TSH SERPL DL<=0.05 MIU/L-ACNC: 0.84 UIU/ML (ref 0.36–3.74)
UROBILINOGEN UR QL STRIP.AUTO: 0.2 E.U./DL
WBC # BLD AUTO: 6.88 THOUSAND/UL (ref 4.31–10.16)
WBC #/AREA URNS AUTO: ABNORMAL /HPF

## 2018-02-17 PROCEDURE — 80061 LIPID PANEL: CPT

## 2018-02-17 PROCEDURE — 99214 OFFICE O/P EST MOD 30 MIN: CPT | Performed by: PHYSICIAN ASSISTANT

## 2018-02-17 PROCEDURE — 86617 LYME DISEASE ANTIBODY: CPT

## 2018-02-17 PROCEDURE — 83036 HEMOGLOBIN GLYCOSYLATED A1C: CPT

## 2018-02-17 PROCEDURE — 85025 COMPLETE CBC W/AUTO DIFF WBC: CPT

## 2018-02-17 PROCEDURE — 86618 LYME DISEASE ANTIBODY: CPT

## 2018-02-17 PROCEDURE — 81001 URINALYSIS AUTO W/SCOPE: CPT | Performed by: PHYSICIAN ASSISTANT

## 2018-02-17 PROCEDURE — 84443 ASSAY THYROID STIM HORMONE: CPT

## 2018-02-17 PROCEDURE — 86038 ANTINUCLEAR ANTIBODIES: CPT

## 2018-02-17 PROCEDURE — 80053 COMPREHEN METABOLIC PANEL: CPT

## 2018-02-17 PROCEDURE — 85652 RBC SED RATE AUTOMATED: CPT

## 2018-02-17 PROCEDURE — 36415 COLL VENOUS BLD VENIPUNCTURE: CPT

## 2018-02-17 RX ORDER — LISINOPRIL 20 MG/1
TABLET ORAL
COMMUNITY
End: 2018-11-07 | Stop reason: SDUPTHER

## 2018-02-17 RX ORDER — FLUTICASONE PROPIONATE 50 MCG
SPRAY, SUSPENSION (ML) NASAL
COMMUNITY
Start: 2016-05-24 | End: 2021-04-05 | Stop reason: SDUPTHER

## 2018-02-17 RX ORDER — ISOSORBIDE MONONITRATE 30 MG/1
TABLET, EXTENDED RELEASE ORAL
COMMUNITY
Start: 2016-08-24 | End: 2018-11-07 | Stop reason: SDUPTHER

## 2018-02-17 RX ORDER — AMLODIPINE BESYLATE 10 MG/1
TABLET ORAL
COMMUNITY
End: 2018-02-17 | Stop reason: SDUPTHER

## 2018-02-17 RX ORDER — DONEPEZIL HYDROCHLORIDE 10 MG/1
TABLET, FILM COATED ORAL
COMMUNITY
End: 2018-11-07 | Stop reason: SDUPTHER

## 2018-02-17 RX ORDER — CARVEDILOL 12.5 MG/1
TABLET ORAL
COMMUNITY
End: 2018-11-07 | Stop reason: SDUPTHER

## 2018-02-17 RX ORDER — CITALOPRAM 20 MG/1
TABLET ORAL
COMMUNITY
End: 2020-03-04 | Stop reason: ALTCHOICE

## 2018-02-17 RX ORDER — FUROSEMIDE 40 MG/1
TABLET ORAL
COMMUNITY
Start: 2018-02-17 | End: 2018-02-17 | Stop reason: SDUPTHER

## 2018-02-17 RX ORDER — FEXOFENADINE HCL 180 MG/1
TABLET ORAL
COMMUNITY
Start: 2016-05-24 | End: 2020-03-04 | Stop reason: ALTCHOICE

## 2018-02-17 RX ORDER — ALBUTEROL SULFATE 90 UG/1
1 AEROSOL, METERED RESPIRATORY (INHALATION) AS NEEDED
COMMUNITY
Start: 2016-05-24 | End: 2021-04-05 | Stop reason: SDUPTHER

## 2018-02-17 RX ORDER — MONTELUKAST SODIUM 10 MG/1
10 TABLET ORAL DAILY
COMMUNITY
Start: 2017-06-05 | End: 2021-04-05 | Stop reason: SDUPTHER

## 2018-02-17 NOTE — PROGRESS NOTES
Assessment/Plan:  Will workup is aching in feverishness as well as his coughing follow-up in 1 week recommend nonsteroidals and over-the-counter cough medicine    No problem-specific Assessment & Plan notes found for this encounter  Diagnoses and all orders for this visit:    Myalgia  -     CBC and differential; Future  -     Comprehensive metabolic panel; Future  -     Hemoglobin A1c; Future  -     Lipid panel; Future  -     TSH, 3rd generation; Future  -     UA w Reflex to Microscopic w Reflex to Culture  -     XR chest pa & lateral; Future  -     Lyme Antibody Profile with reflex to WB; Future  -     Sedimentation rate, automated; Future  -     YEE Screen w/ Reflex to Titer/Pattern; Future    Cough  -     CBC and differential; Future  -     Comprehensive metabolic panel; Future  -     Hemoglobin A1c; Future  -     Lipid panel; Future  -     TSH, 3rd generation; Future  -     UA w Reflex to Microscopic w Reflex to Culture  -     XR chest pa & lateral; Future  -     Lyme Antibody Profile with reflex to WB; Future  -     Sedimentation rate, automated; Future  -     YEE Screen w/ Reflex to Titer/Pattern; Future    Other orders  -     Discontinue: amLODIPine (NORVASC) 10 mg tablet; Take 1 tablet by mouth daily  -     amLODIPine (NORVASC) 5 mg tablet; Take 1 tablet by mouth daily  -     aspirin 81 MG tablet; Take 1 tablet by mouth daily  -     carvedilol (COREG) 12 5 mg tablet; Take 1 tablet by mouth 2 (two) times a day  -     citalopram (CeleXA) 20 mg tablet; Take 1 tablet by mouth daily  -     donepezil (ARICEPT) 10 mg tablet; Take 1 tablet by mouth daily  -     fluticasone (FLONASE) 50 mcg/act nasal spray; 2 sprays into each nostril daily  -     furosemide (LASIX) 20 mg tablet; Take 1 tablet by mouth daily  -     isosorbide mononitrate (IMDUR) 30 mg 24 hr tablet; Take 1 tablet by mouth daily  -     lisinopril (ZESTRIL) 20 mg tablet; Take 1 tablet by mouth daily  -     montelukast (SINGULAIR) 10 mg tablet;  Take 1 tablet by mouth daily  -     nitroglycerin (NITROSTAT) 0 4 mg SL tablet; Place 1 tablet under the tongue  -     potassium chloride (K-DUR) 10 mEq tablet; Take 1 tablet by mouth 2 (two) times a day  -     pravastatin (PRAVACHOL) 40 mg tablet; Take 1 tablet by mouth  -     albuterol (PROAIR HFA) 90 mcg/act inhaler; Inhale 1-2 puffs  -     rOPINIRole (REQUIP) 0 5 mg tablet; Take 1 tablet by mouth  -     fexofenadine (ALLEGRA) 180 MG tablet; 1 tab(s)  -     BREO ELLIPTA 200-25 MCG/INH inhaler;   -     Discontinue: furosemide (LASIX) 40 mg tablet;   -     albuterol (PROVENTIL HFA,VENTOLIN HFA) 90 mcg/act inhaler; 2 puff(s)  -     Discontinue: amLODIPine (NORVASC) 10 mg tablet; 1 tab(s)  -     carvedilol (COREG) 12 5 mg tablet; 1 tab(s)  -     citalopram (CeleXA) 20 mg tablet; 1 tab(s)  -     donepezil (ARICEPT) 10 mg tablet; 1 tab(s)  -     fluticasone (FLONASE) 50 mcg/act nasal spray; 2 spray(s)  -     isosorbide mononitrate (IMDUR) 30 mg 24 hr tablet; 1 tab(s)  -     lisinopril (ZESTRIL) 20 mg tablet; 1 tab(s)  -     montelukast (SINGULAIR) 10 mg tablet; 1 tab(s)          Subjective:      Patient ID: Merle Rodriguez is a 79 y o  male  He has been having generalized muscle and joint aching for 3 weeks in a waxing and waning pattern  Also a persistent cough clear to yellowish phlegm at times  No sore throat hoarseness facial pain  No chills or sweating no rash or inflamed joints  He has numbness of his right forearm at times and occasionally tingling of his fingertips of both hands no headache or visual disturbance  He developed all these symptoms acutely January 19th  He had chest pain as well he was seen in the emergency room where lab work and a chest x-ray were done all his tests were negative except for a slightly low white count  He was discharged with a diagnosis of a viral illness  His symptoms have persisted but not worsening  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  He is normally active and well      Cough   Associated symptoms include myalgias  Generalized Body Aches   Associated symptoms include fatigue and coughing  The following portions of the patient's history were reviewed and updated as appropriate: allergies, current medications, past family history, past medical history, past social history, past surgical history and problem list     Review of Systems   Constitutional: Positive for fatigue  Respiratory: Positive for cough  Musculoskeletal: Positive for myalgias  Neurological: Positive for numbness (Tingling of his fingers)  Objective:    /90   Pulse 88   Temp (!) 97 1 °F (36 2 °C)   Ht 5' 5" (1 651 m)   Wt 89 4 kg (197 lb)   BMI 32 78 kg/m²      Physical Exam   Constitutional: He is oriented to person, place, and time  He appears well-developed  HENT:   Head: Normocephalic  Right Ear: External ear normal    Left Ear: External ear normal    Mouth/Throat: Oropharynx is clear and moist    Eyes: Conjunctivae are normal  Pupils are equal, round, and reactive to light  Neck: Neck supple  No thyromegaly present  Cardiovascular: Normal rate, regular rhythm, normal heart sounds and intact distal pulses  Pulmonary/Chest: Effort normal and breath sounds normal    Abdominal: Soft  Bowel sounds are normal    Musculoskeletal: Normal range of motion  Neurological: He is alert and oriented to person, place, and time  He has normal reflexes  Skin: Skin is warm and dry

## 2018-02-18 LAB
B BURGDOR IGG SER IA-ACNC: 4.55
B BURGDOR IGM SER IA-ACNC: 0.29
RYE IGE QN: NEGATIVE

## 2018-02-20 ENCOUNTER — TELEPHONE (OUTPATIENT)
Dept: INTERNAL MEDICINE CLINIC | Facility: CLINIC | Age: 68
End: 2018-02-20

## 2018-02-20 DIAGNOSIS — A69.23 LYME ARTHRITIS (HCC): Primary | ICD-10-CM

## 2018-02-20 LAB
B BURGDOR IGG PATRN SER IB-IMP: POSITIVE
B BURGDOR IGM PATRN SER IB-IMP: NEGATIVE
B BURGDOR18KD IGG SER QL IB: PRESENT
B BURGDOR23KD IGG SER QL IB: ABNORMAL
B BURGDOR23KD IGM SER QL IB: ABNORMAL
B BURGDOR28KD IGG SER QL IB: ABNORMAL
B BURGDOR30KD IGG SER QL IB: ABNORMAL
B BURGDOR39KD IGG SER QL IB: PRESENT
B BURGDOR39KD IGM SER QL IB: ABNORMAL
B BURGDOR41KD IGG SER QL IB: PRESENT
B BURGDOR41KD IGM SER QL IB: ABNORMAL
B BURGDOR45KD IGG SER QL IB: PRESENT
B BURGDOR58KD IGG SER QL IB: PRESENT
B BURGDOR66KD IGG SER QL IB: PRESENT
B BURGDOR93KD IGG SER QL IB: PRESENT

## 2018-02-20 RX ORDER — DOXYCYCLINE HYCLATE 100 MG/1
100 CAPSULE ORAL EVERY 12 HOURS SCHEDULED
Qty: 42 CAPSULE | Refills: 0 | Status: SHIPPED | OUTPATIENT
Start: 2018-02-20 | End: 2018-03-13

## 2018-03-07 NOTE — PROGRESS NOTES
History of Present Illness    Revaccination   Vaccine Information: Vaccine(s) Given (names): Rachel Thompson [id number]  Unable to reach by phone  Spoke with family regarding vaccine out of temperature range  Action(s): Pt will be revaccinated  Appointment scheduled: [phone] sd  Pt called (attempt 1): [phone] sd  Other Information: patients wife returned call and scheduled appt  [phone] sd  Revaccination Completed: [id number]  Active Problems    1  Anxiety associated with depression (300 4) (F41 8)   2  Chest wall pain (786 52) (R07 89)   3  Chronic obstructive pulmonary disease (496) (J44 9)   4  Coronary artery disease involving native coronary artery of native heart with other form of   angina pectoris (414 01,413 9) (I25 118)   5  Dyspnea on effort (786 09) (R06 09)   6  Flu vaccine need (V04 81) (Z23)   7  High cholesterol (272 0) (E78 00)   8  Insomnia (780 52) (G47 00)   9  MGUS (monoclonal gammopathy of unknown significance) (273 1) (D47 2)   10  Need for pneumococcal vaccination (V03 82) (Z23)   11  Need for revaccination (V05 9) (Z23)   12  Prostate cancer screening (V76 44) (Z12 5)   13  Restless leg syndrome (333 94) (G25 81)   14  Screening for colon cancer (V76 51) (Z12 11)    Immunizations  Influenza --- Matilda Likes: 19-Oct-2015   PCV --- Matilda Likes: 19-Oct-2015   PPSV --- Matilda Likes: long time ago   Tdap --- Matilda Likes: many yrs ago   Zoster --- Matilda Likes: never     Current Meds   1  AmLODIPine Besylate 10 MG Oral Tablet; TAKE 1 TABLET DAILY   2  Aspirin 81 MG TABS; TAKE 1 TABLET DAILY   3  Carvedilol 12 5 MG Oral Tablet; take one tablet by mouth twice daily   4  Citalopram Hydrobromide 20 MG Oral Tablet; Take 1 tablet daily   5  Donepezil HCl - 10 MG Oral Tablet; TAKE 1 TABLET DAILY AS DIRECTED   6  Fluticasone Propionate 50 MCG/ACT Nasal Suspension; USE 2 SPRAYS IN EACH   NOSTRIL ONCE DAILY   7  Furosemide 40 MG Oral Tablet; TAKE 1 TABLET DAILY AS DIRECTED   8   Lisinopril 20 MG Oral Tablet; TAKE 1 TABLET DAILY   9  Montelukast Sodium 10 MG Oral Tablet; take one tablet by mouth daily   10  Nitrostat 0 4 MG Sublingual Tablet Sublingual; DISSOLVE 1 TABLET UNDER THE    TONGUE AS NEEDED FOR CHEST PAIN   11  Potassium Chloride ER 10 MEQ Oral Tablet Extended Release; TAKE 1 TABLET TWICE    DAILY   12  Pravastatin Sodium 40 MG Oral Tablet; TAKE 1 TABLET DAILY AT BEDTIME   13  ProAir  (90 Base) MCG/ACT Inhalation Aerosol Solution; INHALE 1 TO 2 PUFFS    EVERY 4 TO 6 HOURS AS NEEDED   14  ROPINIRole HCl - 0 25 MG Oral Tablet; take 1-2 tablets at bedtime    Allergies    1  No Known Drug Allergies    Future Appointments    Date/Time Provider Specialty Site   03/27/2017 09:00 AM KADI Baires  Internal Medicine [facility]   06/21/2017 11:15 AM KADI Silva   Cardiology [facility]   Electronically signed by : KADI Jarrell ; Jan 11 2017 12:51PM EST

## 2018-03-17 ENCOUNTER — OFFICE VISIT (OUTPATIENT)
Dept: INTERNAL MEDICINE CLINIC | Facility: CLINIC | Age: 68
End: 2018-03-17
Payer: MEDICARE

## 2018-03-17 VITALS
OXYGEN SATURATION: 97 % | BODY MASS INDEX: 33.32 KG/M2 | SYSTOLIC BLOOD PRESSURE: 140 MMHG | HEART RATE: 83 BPM | WEIGHT: 200 LBS | RESPIRATION RATE: 18 BRPM | DIASTOLIC BLOOD PRESSURE: 90 MMHG | TEMPERATURE: 98.4 F | HEIGHT: 65 IN

## 2018-03-17 DIAGNOSIS — G62.9 NEUROPATHY: ICD-10-CM

## 2018-03-17 DIAGNOSIS — A69.20 LYME BORRELIOSIS: Primary | ICD-10-CM

## 2018-03-17 DIAGNOSIS — M13.0 POLYARTHROPATHY: ICD-10-CM

## 2018-03-17 PROBLEM — G72.9 MYOPATHY: Status: ACTIVE | Noted: 2018-03-17

## 2018-03-17 PROBLEM — M60.80: Status: ACTIVE | Noted: 2018-03-17

## 2018-03-17 PROCEDURE — 99214 OFFICE O/P EST MOD 30 MIN: CPT | Performed by: INTERNAL MEDICINE

## 2018-03-17 RX ORDER — DOXYCYCLINE HYCLATE 100 MG/1
100 CAPSULE ORAL EVERY 12 HOURS SCHEDULED
Qty: 60 CAPSULE | Refills: 2 | Status: SHIPPED | OUTPATIENT
Start: 2018-03-17 | End: 2018-03-22

## 2018-03-17 RX ORDER — AMOXICILLIN 500 MG/1
500 CAPSULE ORAL EVERY 8 HOURS SCHEDULED
Qty: 90 CAPSULE | Refills: 2 | Status: SHIPPED | OUTPATIENT
Start: 2018-03-17 | End: 2018-03-22

## 2018-03-17 NOTE — PROGRESS NOTES
Assessment/Plan:       Diagnoses and all orders for this visit:    Lyme borreliosis  -     CBC and differential; Future  -     Comprehensive metabolic panel; Future  -     YEE Screen w/ Reflex to Titer/Pattern; Future  -     Aldolase; Future  -     Sedimentation rate, automated; Future  -     Lyme Antibody Profile with reflex to WB; Future  -     TSH, 3rd generation with T4 reflex; Future  -     Urinalysis with reflex to microscopic  -     CK (with reflex to MB); Future    Neuropathy    Polyarthropathy  -     CBC and differential; Future  -     Comprehensive metabolic panel; Future  -     YEE Screen w/ Reflex to Titer/Pattern; Future  -     Aldolase; Future  -     Sedimentation rate, automated; Future  -     Lyme Antibody Profile with reflex to WB; Future  -     TSH, 3rd generation with T4 reflex; Future  -     Urinalysis with reflex to microscopic  -     CK (with reflex to MB); Future              Subjective:      Patient ID: Phil Deng is a 76 y o  male  This patient was seen about a month ago for diffuse aching all over, center some laboratory testing and found to have positive Lyme titer  He was given doxycycline for 3 weeks but the aching is in fact worse and started to get worse after he took the drug  In speaking with him, it seems that he has had this situation getting better getting worse waxing and waning for years  No prior treatment  No diagnosis of any kind of rheumatological condition  Associated symptom of a sensation of tingling in the right arm medially extending from the shoulder up and down; his description is clearly neuropathic with tingling and numbness  He feels that he has developed some weakness of that arm in particular when lifting  No visible atrophy      he has a prescription for pravastatin but, fortunately in the situation, he stopped taking it over 3 months ago  This did not make anything better          The following portions of the patient's history were reviewed and updated as appropriate:   He has a past medical history of Nonischemic cardiomyopathy ([address] )  ,   does not have any pertinent problems on file  ,   has a past surgical history that includes Cardiac catheterization and Lipoma resection  ,  family history includes No Known Problems in his father and mother  He was adopted  ,   reports that he has quit smoking  He has never used smokeless tobacco  He reports that he drinks about 3 6 oz of alcohol per week   He reports that he does not use drugs  ,  is allergic to pollen extract     Current Outpatient Prescriptions   Medication Sig Dispense Refill    albuterol (PROAIR HFA) 90 mcg/act inhaler Inhale 1-2 puffs      albuterol (PROVENTIL HFA,VENTOLIN HFA) 90 mcg/act inhaler 2 puff(s)      amLODIPine (NORVASC) 5 mg tablet Take 1 tablet by mouth daily      aspirin 81 MG tablet Take 1 tablet by mouth daily      BREO ELLIPTA 200-25 MCG/INH inhaler       carvedilol (COREG) 12 5 mg tablet Take 1 tablet by mouth 2 (two) times a day      citalopram (CeleXA) 20 mg tablet Take 1 tablet by mouth daily      donepezil (ARICEPT) 10 mg tablet Take 1 tablet by mouth daily      fexofenadine (ALLEGRA) 180 MG tablet 1 tab(s)      fluticasone (FLONASE) 50 mcg/act nasal spray 2 sprays into each nostril daily      furosemide (LASIX) 20 mg tablet Take 1 tablet by mouth daily      isosorbide mononitrate (IMDUR) 30 mg 24 hr tablet Take 1 tablet by mouth daily      lisinopril (ZESTRIL) 20 mg tablet Take 1 tablet by mouth daily      potassium chloride (K-DUR) 10 mEq tablet Take 1 tablet by mouth 2 (two) times a day      rOPINIRole (REQUIP) 0 5 mg tablet Take 1 tablet by mouth      carvedilol (COREG) 12 5 mg tablet 1 tab(s)      citalopram (CeleXA) 20 mg tablet 1 tab(s)      donepezil (ARICEPT) 10 mg tablet 1 tab(s)      fluticasone (FLONASE) 50 mcg/act nasal spray 2 spray(s)      isosorbide mononitrate (IMDUR) 30 mg 24 hr tablet 1 tab(s)      lisinopril (ZESTRIL) 20 mg tablet 1 tab(s)      montelukast (SINGULAIR) 10 mg tablet Take 1 tablet by mouth daily      montelukast (SINGULAIR) 10 mg tablet 1 tab(s)      nitroglycerin (NITROSTAT) 0 4 mg SL tablet Place 1 tablet under the tongue       No current facility-administered medications for this visit  Review of Systems   Constitutional: Negative for chills and fever  HENT: Negative for sore throat and trouble swallowing  Eyes: Negative for pain  Respiratory: Negative for cough and shortness of breath  Cardiovascular: Negative for chest pain and palpitations  Gastrointestinal: Negative for abdominal pain, blood in stool, diarrhea, nausea and vomiting  Endocrine: Negative for cold intolerance and heat intolerance  Genitourinary: Negative for dysuria, frequency and testicular pain  Musculoskeletal: Positive for arthralgias and joint swelling  Negative for neck pain and neck stiffness  Allergic/Immunologic: Negative for immunocompromised state  Neurological: Positive for numbness  Negative for dizziness, syncope and headaches  Hematological: Negative for adenopathy  Does not bruise/bleed easily  Psychiatric/Behavioral: Negative for dysphoric mood  The patient is not nervous/anxious  Objective:  Vitals:    03/17/18 1306   BP: 140/90   Pulse: 83   Resp: 18   Temp: 98 4 °F (36 9 °C)   SpO2: 97%      Physical Exam   Constitutional: He is oriented to person, place, and time  He appears well-developed and well-nourished  HENT:   Head: Normocephalic and atraumatic  Eyes: Pupils are equal, round, and reactive to light  Neck: Normal range of motion  Neck supple  No tracheal deviation present  No thyromegaly present  Cardiovascular: Normal rate, regular rhythm and normal heart sounds  Exam reveals no gallop  No murmur heard  Pulmonary/Chest: Effort normal  No respiratory distress  He has no wheezes  He has no rales  Abdominal: Soft  Bowel sounds are normal  There is no tenderness  Musculoskeletal: Normal range of motion  He exhibits no tenderness or deformity  Neurological: He is alert and oriented to person, place, and time  He has normal reflexes  Coordination normal    Skin: Skin is warm and dry  Psychiatric: He has a normal mood and affect

## 2018-03-19 ENCOUNTER — APPOINTMENT (OUTPATIENT)
Dept: LAB | Facility: CLINIC | Age: 68
End: 2018-03-19
Payer: MEDICARE

## 2018-03-19 DIAGNOSIS — M13.0 POLYARTHROPATHY: ICD-10-CM

## 2018-03-19 DIAGNOSIS — A69.20 LYME BORRELIOSIS: ICD-10-CM

## 2018-03-19 LAB
ALBUMIN SERPL BCP-MCNC: 3.1 G/DL (ref 3.5–5)
ALP SERPL-CCNC: 70 U/L (ref 46–116)
ALT SERPL W P-5'-P-CCNC: 26 U/L (ref 12–78)
ANION GAP SERPL CALCULATED.3IONS-SCNC: 7 MMOL/L (ref 4–13)
AST SERPL W P-5'-P-CCNC: 17 U/L (ref 5–45)
BACTERIA UR QL AUTO: ABNORMAL /HPF
BASOPHILS # BLD AUTO: 0.02 THOUSANDS/ΜL (ref 0–0.1)
BASOPHILS NFR BLD AUTO: 0 % (ref 0–1)
BILIRUB SERPL-MCNC: 0.53 MG/DL (ref 0.2–1)
BILIRUB UR QL STRIP: NEGATIVE
BUN SERPL-MCNC: 14 MG/DL (ref 5–25)
CALCIUM SERPL-MCNC: 8.7 MG/DL (ref 8.3–10.1)
CHLORIDE SERPL-SCNC: 108 MMOL/L (ref 100–108)
CK SERPL-CCNC: 84 U/L (ref 39–308)
CLARITY UR: CLEAR
CO2 SERPL-SCNC: 25 MMOL/L (ref 21–32)
COLOR UR: YELLOW
CREAT SERPL-MCNC: 1.29 MG/DL (ref 0.6–1.3)
EOSINOPHIL # BLD AUTO: 0.93 THOUSAND/ΜL (ref 0–0.61)
EOSINOPHIL NFR BLD AUTO: 12 % (ref 0–6)
ERYTHROCYTE [DISTWIDTH] IN BLOOD BY AUTOMATED COUNT: 12.8 % (ref 11.6–15.1)
ERYTHROCYTE [SEDIMENTATION RATE] IN BLOOD: 22 MM/HOUR (ref 0–10)
GFR SERPL CREATININE-BSD FRML MDRD: 57 ML/MIN/1.73SQ M
GLUCOSE P FAST SERPL-MCNC: 101 MG/DL (ref 65–99)
GLUCOSE UR STRIP-MCNC: NEGATIVE MG/DL
HCT VFR BLD AUTO: 45.7 % (ref 36.5–49.3)
HGB BLD-MCNC: 15.7 G/DL (ref 12–17)
HGB UR QL STRIP.AUTO: NEGATIVE
KETONES UR STRIP-MCNC: NEGATIVE MG/DL
LEUKOCYTE ESTERASE UR QL STRIP: NEGATIVE
LYMPHOCYTES # BLD AUTO: 1.85 THOUSANDS/ΜL (ref 0.6–4.47)
LYMPHOCYTES NFR BLD AUTO: 23 % (ref 14–44)
MCH RBC QN AUTO: 30.3 PG (ref 26.8–34.3)
MCHC RBC AUTO-ENTMCNC: 34.4 G/DL (ref 31.4–37.4)
MCV RBC AUTO: 88 FL (ref 82–98)
MONOCYTES # BLD AUTO: 0.74 THOUSAND/ΜL (ref 0.17–1.22)
MONOCYTES NFR BLD AUTO: 9 % (ref 4–12)
MUCOUS THREADS UR QL AUTO: ABNORMAL
NEUTROPHILS # BLD AUTO: 4.49 THOUSANDS/ΜL (ref 1.85–7.62)
NEUTS SEG NFR BLD AUTO: 56 % (ref 43–75)
NITRITE UR QL STRIP: NEGATIVE
NON-SQ EPI CELLS URNS QL MICRO: ABNORMAL /HPF
NRBC BLD AUTO-RTO: 0 /100 WBCS
PH UR STRIP.AUTO: 5.5 [PH] (ref 4.5–8)
PLATELET # BLD AUTO: 278 THOUSANDS/UL (ref 149–390)
PMV BLD AUTO: 10.8 FL (ref 8.9–12.7)
POTASSIUM SERPL-SCNC: 3.8 MMOL/L (ref 3.5–5.3)
PROT SERPL-MCNC: 7.7 G/DL (ref 6.4–8.2)
PROT UR STRIP-MCNC: ABNORMAL MG/DL
RBC # BLD AUTO: 5.19 MILLION/UL (ref 3.88–5.62)
RBC #/AREA URNS AUTO: ABNORMAL /HPF
SODIUM SERPL-SCNC: 140 MMOL/L (ref 136–145)
SP GR UR STRIP.AUTO: 1.03 (ref 1–1.03)
TSH SERPL DL<=0.05 MIU/L-ACNC: 1.12 UIU/ML (ref 0.36–3.74)
UROBILINOGEN UR QL STRIP.AUTO: 0.2 E.U./DL
WBC # BLD AUTO: 8.05 THOUSAND/UL (ref 4.31–10.16)
WBC #/AREA URNS AUTO: ABNORMAL /HPF

## 2018-03-19 PROCEDURE — 84443 ASSAY THYROID STIM HORMONE: CPT

## 2018-03-19 PROCEDURE — 86038 ANTINUCLEAR ANTIBODIES: CPT

## 2018-03-19 PROCEDURE — 82550 ASSAY OF CK (CPK): CPT

## 2018-03-19 PROCEDURE — 86617 LYME DISEASE ANTIBODY: CPT

## 2018-03-19 PROCEDURE — 85652 RBC SED RATE AUTOMATED: CPT

## 2018-03-19 PROCEDURE — 81001 URINALYSIS AUTO W/SCOPE: CPT | Performed by: INTERNAL MEDICINE

## 2018-03-19 PROCEDURE — 80053 COMPREHEN METABOLIC PANEL: CPT

## 2018-03-19 PROCEDURE — 85025 COMPLETE CBC W/AUTO DIFF WBC: CPT

## 2018-03-19 PROCEDURE — 86618 LYME DISEASE ANTIBODY: CPT

## 2018-03-19 PROCEDURE — 82085 ASSAY OF ALDOLASE: CPT

## 2018-03-19 PROCEDURE — 36415 COLL VENOUS BLD VENIPUNCTURE: CPT

## 2018-03-20 LAB
ALDOLASE SERPL-CCNC: 4.7 U/L (ref 3.3–10.3)
B BURGDOR IGG SER IA-ACNC: 4.55
B BURGDOR IGM SER IA-ACNC: 0.36

## 2018-03-21 LAB
B BURGDOR IGG PATRN SER IB-IMP: POSITIVE
B BURGDOR IGM PATRN SER IB-IMP: NEGATIVE
B BURGDOR18KD IGG SER QL IB: PRESENT
B BURGDOR23KD IGG SER QL IB: ABNORMAL
B BURGDOR23KD IGM SER QL IB: ABNORMAL
B BURGDOR28KD IGG SER QL IB: PRESENT
B BURGDOR30KD IGG SER QL IB: PRESENT
B BURGDOR39KD IGG SER QL IB: PRESENT
B BURGDOR39KD IGM SER QL IB: ABNORMAL
B BURGDOR41KD IGG SER QL IB: PRESENT
B BURGDOR41KD IGM SER QL IB: ABNORMAL
B BURGDOR45KD IGG SER QL IB: PRESENT
B BURGDOR58KD IGG SER QL IB: PRESENT
B BURGDOR66KD IGG SER QL IB: PRESENT
B BURGDOR93KD IGG SER QL IB: PRESENT
RYE IGE QN: NEGATIVE

## 2018-03-22 ENCOUNTER — OFFICE VISIT (OUTPATIENT)
Dept: INTERNAL MEDICINE CLINIC | Facility: CLINIC | Age: 68
End: 2018-03-22
Payer: MEDICARE

## 2018-03-22 VITALS
BODY MASS INDEX: 32.92 KG/M2 | HEIGHT: 65 IN | TEMPERATURE: 97.8 F | SYSTOLIC BLOOD PRESSURE: 152 MMHG | WEIGHT: 197.6 LBS | OXYGEN SATURATION: 95 % | DIASTOLIC BLOOD PRESSURE: 90 MMHG | HEART RATE: 73 BPM

## 2018-03-22 DIAGNOSIS — M13.0 POLYARTHROPATHY: Primary | ICD-10-CM

## 2018-03-22 PROCEDURE — 99214 OFFICE O/P EST MOD 30 MIN: CPT | Performed by: PHYSICIAN ASSISTANT

## 2018-03-22 RX ORDER — PREDNISONE 10 MG/1
TABLET ORAL
Qty: 30 TABLET | Refills: 0 | Status: SHIPPED | OUTPATIENT
Start: 2018-03-22 | End: 2018-11-07 | Stop reason: ALTCHOICE

## 2018-03-22 NOTE — PROGRESS NOTES
Assessment/Plan:      Polyarthropathy and myopathy - patient is advised to stop the 2 antibiotics, doxycycline and amoxicillin  We will start him on a prednisone taper and refer him to Orthopedics      the case was discussed with Dr Ngozi Banuelos who agrees with the assessment and plan    No problem-specific Assessment & Plan notes found for this encounter  Diagnoses and all orders for this visit:    Polyarthropathy  -     predniSONE 10 mg tablet; 40mg daily for 3 days, 30mg daily for 3 days, 20mg daily for 3 days, 10mg daily for 3 days  -     Ambulatory referral to Orthopedic Surgery; Future          Subjective:      Patient ID: Wallace Thayer is a 76 y o  male  Follow-up  Patient is here for a follow-up previous  Visit 4-5 days ago  He has been experiencing all over body aching, and multiple joint pain for a long time  He was seen about a month ago where a Lyme test was done and was positive for at least 5 IgG bands  He was started on doxycycline and came back to see Dr Rollo Sever 5 days ago  At that time he reported that despite the antibiotics his pain was worse  Dr Rollo Sever repeated some and ordered new blood work and added amoxicillin to the doxycycline regimen  the new blood work does not show anything significant other than the Lyme IgG bands which are still positive  The patient still reports the same symptoms and he says they feel worse  The following portions of the patient's history were reviewed and updated as appropriate: allergies, current medications, past family history, past medical history, past social history, past surgical history and problem list     Review of Systems   Constitutional: Positive for fatigue  Negative for chills, diaphoresis and fever  Respiratory: Negative for cough and chest tightness  Cardiovascular: Negative for chest pain and palpitations  Musculoskeletal: Positive for arthralgias and myalgias           Objective:      /90 (BP Location: Left arm, Patient Position: Sitting)   Pulse 73   Temp 97 8 °F (36 6 °C) (Oral)   Ht 5' 5" (1 651 m)   Wt 89 6 kg (197 lb 9 6 oz)   SpO2 95%   BMI 32 88 kg/m²          Physical Exam   Constitutional: He appears well-developed and well-nourished  Cardiovascular: Normal rate, regular rhythm and normal heart sounds  Pulmonary/Chest: Effort normal and breath sounds normal    Abdominal: Soft  Bowel sounds are normal  There is no tenderness  Musculoskeletal: He exhibits no edema or deformity

## 2018-05-21 DIAGNOSIS — G25.81 RESTLESS LEG: Primary | ICD-10-CM

## 2018-05-22 RX ORDER — ROPINIROLE 0.5 MG/1
0.5 TABLET, FILM COATED ORAL
Qty: 90 TABLET | Refills: 0 | Status: SHIPPED | OUTPATIENT
Start: 2018-05-22 | End: 2018-11-21 | Stop reason: SDUPTHER

## 2018-06-19 ENCOUNTER — TELEPHONE (OUTPATIENT)
Dept: INTERNAL MEDICINE CLINIC | Facility: CLINIC | Age: 68
End: 2018-06-19

## 2018-06-19 DIAGNOSIS — I10 HYPERTENSION, UNSPECIFIED TYPE: Primary | ICD-10-CM

## 2018-06-19 RX ORDER — LISINOPRIL 20 MG/1
20 TABLET ORAL DAILY
Qty: 90 TABLET | Refills: 3 | Status: SHIPPED | OUTPATIENT
Start: 2018-06-19 | End: 2019-05-15 | Stop reason: SDUPTHER

## 2018-07-07 ENCOUNTER — TELEPHONE (OUTPATIENT)
Dept: INTERNAL MEDICINE CLINIC | Facility: CLINIC | Age: 68
End: 2018-07-07

## 2018-07-07 NOTE — TELEPHONE ENCOUNTER
EBRT HICKMAN   ASKING  FOR  RESULTS  OF  HIS  [address]  TO BE  SENT   TO  DR HERNANDEZ [facility]  FAX  [phone]    PHONE  NUMBER  [phone]  ANY QUESTIONS  [phone]

## 2018-10-06 DIAGNOSIS — I10 HYPERTENSION, ESSENTIAL: Primary | ICD-10-CM

## 2018-10-08 RX ORDER — AMLODIPINE BESYLATE 5 MG/1
TABLET ORAL
Qty: 90 TABLET | Refills: 3 | Status: SHIPPED | OUTPATIENT
Start: 2018-10-08 | End: 2018-10-22 | Stop reason: SDUPTHER

## 2018-10-22 DIAGNOSIS — I10 HYPERTENSION, ESSENTIAL: ICD-10-CM

## 2018-10-22 RX ORDER — AMLODIPINE BESYLATE 5 MG/1
5 TABLET ORAL DAILY
Qty: 90 TABLET | Refills: 3 | Status: SHIPPED | OUTPATIENT
Start: 2018-10-22 | End: 2018-11-07 | Stop reason: ALTCHOICE

## 2018-11-07 ENCOUNTER — OFFICE VISIT (OUTPATIENT)
Dept: CARDIOLOGY CLINIC | Facility: CLINIC | Age: 68
End: 2018-11-07
Payer: MEDICARE

## 2018-11-07 VITALS
SYSTOLIC BLOOD PRESSURE: 154 MMHG | HEART RATE: 67 BPM | BODY MASS INDEX: 33.99 KG/M2 | DIASTOLIC BLOOD PRESSURE: 98 MMHG | HEIGHT: 65 IN | WEIGHT: 204 LBS | OXYGEN SATURATION: 98 %

## 2018-11-07 DIAGNOSIS — I42.9 CARDIOMYOPATHY, UNSPECIFIED TYPE (HCC): ICD-10-CM

## 2018-11-07 DIAGNOSIS — I25.10 CORONARY ARTERY DISEASE INVOLVING NATIVE CORONARY ARTERY OF NATIVE HEART WITHOUT ANGINA PECTORIS: Primary | ICD-10-CM

## 2018-11-07 DIAGNOSIS — I50.32 CHRONIC DIASTOLIC CONGESTIVE HEART FAILURE (HCC): ICD-10-CM

## 2018-11-07 DIAGNOSIS — E78.00 HIGH CHOLESTEROL: ICD-10-CM

## 2018-11-07 DIAGNOSIS — I10 HYPERTENSION, ESSENTIAL: ICD-10-CM

## 2018-11-07 PROCEDURE — 99214 OFFICE O/P EST MOD 30 MIN: CPT | Performed by: INTERNAL MEDICINE

## 2018-11-07 RX ORDER — TRAZODONE HYDROCHLORIDE 50 MG/1
50 TABLET ORAL
COMMUNITY
End: 2020-03-04 | Stop reason: ALTCHOICE

## 2018-11-07 RX ORDER — AMLODIPINE BESYLATE 10 MG/1
10 TABLET ORAL DAILY
Qty: 90 TABLET | Refills: 3 | Status: SHIPPED | OUTPATIENT
Start: 2018-11-07 | End: 2019-05-15 | Stop reason: SDUPTHER

## 2018-11-07 NOTE — PROGRESS NOTES
[facility] [place]  [address]  [address] [place]  Cardiology Follow Up    Nick Willams  1950  [id number]      8  Coronary artery disease involving native coronary artery of native heart without angina pectoris     2  High cholesterol     3  Chronic diastolic congestive heart failure ([facility] [address] )     4  Hypertension, essential         Chief Complaint   Patient presents with    Follow-up       Interval History:  Patient presents for follow-up visit  Patient does have history of cardiomyopathy from which she has recovered  Likely nonischemic  Patient also has history of pulmonary issues followed by Dr Radha Vallejo  Patient denies any history of chest pain arm pain neck pain or jaw pain  Patient was also diagnosed to have chronic Lyme disease by physician in [place] and is getting treatment for the same  Patient does have some memory difficulties  Patient is also followed by Dr Yfn Linn for primary care  Patient was recently started on trazodone      Patient Active Problem List   Diagnosis    Anxiety associated with depression    Chronic obstructive pulmonary disease (HCC)    Congestive heart failure ([facility] [facility] )    Coronary artery disease    High cholesterol    Hypertension, essential    Insomnia    Lyme borreliosis    Polyarthropathy    Neuropathy    Myopathy     Past Medical History:   Diagnosis Date    Nonischemic cardiomyopathy ([facility] [address] )     last assessed 6/21/17; resolved 9/27/16     Social History     Social History    Marital status: /Civil Union     Spouse name: N/A    Number of children: N/A    Years of education: N/A     Occupational History    retired      Social History Main Topics    Smoking status: Former Smoker    Smokeless tobacco: Never Used    Alcohol use 3 6 oz/week     6 Glasses of wine per week      Comment: with dinner    Drug use: No    Sexual activity: Yes     Partners: Male     Other Topics Concern    Not on file     Social History Narrative Disabled        No advance directives          Family History   Problem Relation Age of Onset    Adopted: Yes    No Known Problems Mother     No Known Problems Father      Past Surgical History:   Procedure Laterality Date    CARDIAC CATHETERIZATION      his ablation    LIPOMA RESECTION         Current Outpatient Prescriptions:     amLODIPine (NORVASC) 5 mg tablet, Take 1 tablet (5 mg total) by mouth daily For blood pressure, Disp: 90 tablet, Rfl: 3    BREO ELLIPTA 200-25 MCG/INH inhaler, , Disp: , Rfl:     carvedilol (COREG) 12 5 mg tablet, Take 1 tablet by mouth 2 (two) times a day, Disp: , Rfl:     donepezil (ARICEPT) 10 mg tablet, Take 1 tablet by mouth daily, Disp: , Rfl:     fluticasone (FLONASE) 50 mcg/act nasal spray, 2 spray(s), Disp: , Rfl:     lisinopril (ZESTRIL) 20 mg tablet, Take 1 tablet (20 mg total) by mouth daily, Disp: 90 tablet, Rfl: 3    montelukast (SINGULAIR) 10 mg tablet, 1 tab(s), Disp: , Rfl:     rOPINIRole (REQUIP) 0 5 mg tablet, Take 1 tablet (0 5 mg total) by mouth daily at bedtime, Disp: 90 tablet, Rfl: 0    albuterol (PROVENTIL HFA,VENTOLIN HFA) 90 mcg/act inhaler, 2 puff(s), Disp: , Rfl:     aspirin 81 MG tablet, Take 1 tablet by mouth daily, Disp: , Rfl:     citalopram (CeleXA) 20 mg tablet, 1 tab(s), Disp: , Rfl:     fexofenadine (ALLEGRA) 180 MG tablet, 1 tab(s), Disp: , Rfl:     furosemide (LASIX) 20 mg tablet, Take 1 tablet by mouth daily, Disp: , Rfl:     isosorbide mononitrate (IMDUR) 30 mg 24 hr tablet, Take 1 tablet by mouth daily, Disp: , Rfl:     nitroglycerin (NITROSTAT) 0 4 mg SL tablet, Place 1 tablet under the tongue, Disp: , Rfl:     potassium chloride (K-DUR) 10 mEq tablet, Take 1 tablet by mouth 2 (two) times a day, Disp: , Rfl:   Allergies   Allergen Reactions    Pollen Extract        Labs:  No visits with results within 2 Month(s) from this visit     Latest known visit with results is:   Appointment on 03/19/2018   Component Date Value    WBC 03/19/2018 8 05     RBC 03/19/2018 5 19     Hemoglobin 03/19/2018 15 7     Hematocrit 03/19/2018 45 7     MCV 03/19/2018 88     MCH 03/19/2018 30 3     MCHC 03/19/2018 34 4     RDW 03/19/2018 12 8     MPV 03/19/2018 10 8     Platelets 32/02/1472 278     nRBC 03/19/2018 0     Neutrophils Relative 03/19/2018 56     Lymphocytes Relative 03/19/2018 23     Monocytes Relative 03/19/2018 9     Eosinophils Relative 03/19/2018 12*    Basophils Relative 03/19/2018 0     Neutrophils Absolute 03/19/2018 4 49     Lymphocytes Absolute 03/19/2018 1 85     Monocytes Absolute 03/19/2018 0 74     Eosinophils Absolute 03/19/2018 0 93*    Basophils Absolute 03/19/2018 0 02     Sodium 03/19/2018 140     Potassium 03/19/2018 3 8     Chloride 03/19/2018 108     CO2 03/19/2018 25     ANION GAP 03/19/2018 7     BUN 03/19/2018 14     Creatinine 03/19/2018 1 29     Glucose, Fasting 03/19/2018 101*    Calcium 03/19/2018 8 7     AST 03/19/2018 17     ALT 03/19/2018 26     Alkaline Phosphatase 03/19/2018 70     Total Protein 03/19/2018 7 7     Albumin 03/19/2018 3 1*    Total Bilirubin 03/19/2018 0 53     eGFR 03/19/2018 57     YEE 03/19/2018 Negative     Aldolase 03/19/2018 4 7     Sed Rate 03/19/2018 22*    LYME AB IGG 03/19/2018 4 55*    LYME AB IGM 03/19/2018 0 36     TSH 3RD GENERATON 03/19/2018 1 120     Total CK 03/19/2018 84     Lyme 18 kD IgG 03/19/2018 Present*    Lyme 23 kD IgG 03/19/2018 Absent     Lyme 28 kD IgG 03/19/2018 Present*    Lyme 30 kD IgG 03/19/2018 Present*    Lyme 39 kD IgG 03/19/2018 Present*    Lyme 41 kD IgG 03/19/2018 Present*    Lyme 45 kD IgG 03/19/2018 Present*    Lyme 58 kD IgG 03/19/2018 Present*    Lyme 66 kD IgG 03/19/2018 Present*    Lyme 93 kD IgG 03/19/2018 Present*    Lyme 23 kD IgM 03/19/2018 Absent     Lyme 39 kD IgM 03/19/2018 Absent     Lyme 41 kD IgM 03/19/2018 Absent     Lyme IgG WB Interp  03/19/2018 Positive*    Lyme IgM WB Interp  03/19/2018 Negative      Imaging: No results found  Review of Systems:  Review of Systems   REVIEW OF SYSTEMS:  Constitutional:  Denies fever or chills   Eyes:  Denies change in visual acuity   HENT:  Denies nasal congestion or sore throat   Respiratory:shortness of breath   Cardiovascular:  Denies chest pain or edema   GI:  Denies abdominal pain, nausea, vomiting, bloody stools or diarrhea   :  Denies dysuria, frequency, difficulty in micturition and nocturia  Musculoskeletal:  Patient diagnosed to have chronic Lyme disease with aches and pains all over  Neurologic:  Memory difficulties   Endocrine:  Denies polyuria or polydipsia   Lymphatic:  Denies swollen glands   Psychiatric:  Denies depression or anxiety     Physical Exam:    /98 (BP Location: Left arm, Patient Position: Sitting, Cuff Size: Large)   Pulse 67   Ht 5' 5" (1 651 m)   Wt 92 5 kg (204 lb)   SpO2 98%   BMI 33 95 kg/m²     Physical Exam   PHYSICAL EXAM:  General:  Patient is not in acute distress   Head: Normocephalic, Atraumatic  HEENT:  Both pupils normal-size atraumatic, normocephalic, nonicteric  Neck:  JVP not raised  Trachea central  No carotid bruit  Respiratory:  Decreased breath sounds  Cardiovascular:  Regular rate and rhythm no S3 no murmurs  GI:  Abdomen soft nontender  No organomegaly  Lymphatic:  No cervical or inguinal lymphadenopathy  Neurologic:  Patient is awake alert, oriented   Grossly nonfocal    Discussion/Summary:  Patient with multiple medical problems who seems to be doing reasonably well from cardiac standpoint  Previous studies reviewed with patient  Medications reviewed and possible side effects discussed  concepts of cardiovascular disease , signs and symptoms of heart disease  Dietary and risk factor modification reinforced  All questions answered  Safety measures reviewed  Patient advised to report any problems prompting medical attention    Patient will be scheduled for an echocardiogram to reassess ejection fraction valves and look for evidence of pulmonary hypertension given symptoms of shortness of breath and past history of cardiomyopathy  Patient had a lot of questions which were answered  Patient's blood pressures are elevated  Amlodipine increased to 10 mg daily  Follow-up with primary care physician and Pulmonary as well as his physicians in [place]  Follow-up in 6 months or earlier as needed  Patient is agreeable with the plan of care

## 2018-11-15 ENCOUNTER — TELEPHONE (OUTPATIENT)
Dept: INTERNAL MEDICINE CLINIC | Facility: CLINIC | Age: 68
End: 2018-11-15

## 2018-11-15 DIAGNOSIS — I10 HYPERTENSION, UNSPECIFIED TYPE: Primary | ICD-10-CM

## 2018-11-15 RX ORDER — CARVEDILOL 12.5 MG/1
12.5 TABLET ORAL 2 TIMES DAILY
Qty: 90 TABLET | Refills: 3 | Status: SHIPPED | OUTPATIENT
Start: 2018-11-15 | End: 2019-05-15 | Stop reason: SDUPTHER

## 2018-11-21 DIAGNOSIS — G25.81 RESTLESS LEG: ICD-10-CM

## 2018-11-21 RX ORDER — ROPINIROLE 0.5 MG/1
0.5 TABLET, FILM COATED ORAL
Qty: 90 TABLET | Refills: 0 | Status: SHIPPED | OUTPATIENT
Start: 2018-11-21 | End: 2019-04-09 | Stop reason: SDUPTHER

## 2018-11-29 ENCOUNTER — HOSPITAL ENCOUNTER (OUTPATIENT)
Dept: NON INVASIVE DIAGNOSTICS | Facility: CLINIC | Age: 68
Discharge: HOME | End: 2018-11-29
Payer: MEDICARE

## 2018-11-29 DIAGNOSIS — I42.9 CARDIOMYOPATHY, UNSPECIFIED TYPE (HCC): ICD-10-CM

## 2018-11-29 PROCEDURE — 93306 TTE W/DOPPLER COMPLETE: CPT

## 2018-11-29 PROCEDURE — 93306 TTE W/DOPPLER COMPLETE: CPT | Performed by: INTERNAL MEDICINE

## 2018-12-05 ENCOUNTER — OFFICE VISIT (OUTPATIENT)
Dept: INTERNAL MEDICINE CLINIC | Facility: CLINIC | Age: 68
End: 2018-12-05
Payer: MEDICARE

## 2018-12-05 VITALS
HEART RATE: 82 BPM | BODY MASS INDEX: 34.26 KG/M2 | SYSTOLIC BLOOD PRESSURE: 120 MMHG | WEIGHT: 205.6 LBS | DIASTOLIC BLOOD PRESSURE: 78 MMHG | HEIGHT: 65 IN | OXYGEN SATURATION: 97 %

## 2018-12-05 DIAGNOSIS — I50.32 CHRONIC DIASTOLIC CONGESTIVE HEART FAILURE (HCC): ICD-10-CM

## 2018-12-05 DIAGNOSIS — G62.9 NEUROPATHY: ICD-10-CM

## 2018-12-05 DIAGNOSIS — I10 HYPERTENSION, ESSENTIAL: Primary | ICD-10-CM

## 2018-12-05 DIAGNOSIS — M19.90 INFLAMMATORY ARTHROPATHY: ICD-10-CM

## 2018-12-05 DIAGNOSIS — M13.0 POLYARTHROPATHY: ICD-10-CM

## 2018-12-05 DIAGNOSIS — A69.20 LYME BORRELIOSIS: ICD-10-CM

## 2018-12-05 PROCEDURE — 99214 OFFICE O/P EST MOD 30 MIN: CPT | Performed by: INTERNAL MEDICINE

## 2018-12-05 RX ORDER — HYDROXYCHLOROQUINE SULFATE 200 MG/1
200 TABLET, FILM COATED ORAL 2 TIMES DAILY WITH MEALS
Qty: 60 TABLET | Refills: 3 | Status: SHIPPED | OUTPATIENT
Start: 2018-12-05 | End: 2019-01-08 | Stop reason: SDUPTHER

## 2018-12-05 NOTE — PATIENT INSTRUCTIONS
A patient with chronic poly arthropathic pain  Associated component of myalgia or neuropathic pain  He has failed antibiotic with working diagnosis of Lyme disease  He received a month of doxycycline in February of this year and the month Rocephin in June  Therapeutic trial of Plaquenil 200 mg p o  B i d  With working diagnosis of seronegative rheumatoid arthritis    Do not take Aleve or Advil  This would be bad for your heart    Okay    See me back here after the new year

## 2018-12-05 NOTE — PROGRESS NOTES
Assessment/Plan:       Diagnoses and all orders for this visit:    Hypertension, essential  -     CBC and differential; Future  -     Comprehensive metabolic panel; Future  -     YEE Screen w/ Reflex to Titer/Pattern; Future  -     Lyme Antibody Profile with reflex to WB; Future  -     RF Screen w/ Reflex to Titer; Future  -     Sedimentation rate, automated; Future  -     Uric acid; Future  -     NT-BNP PRO; Future    Polyarthropathy  -     hydroxychloroquine (PLAQUENIL) 200 mg tablet; Take 1 tablet (200 mg total) by mouth 2 (two) times a day with meals for 90 days    Inflammatory arthropathy  -     hydroxychloroquine (PLAQUENIL) 200 mg tablet; Take 1 tablet (200 mg total) by mouth 2 (two) times a day with meals for 90 days  -     CBC and differential; Future  -     Comprehensive metabolic panel; Future  -     YEE Screen w/ Reflex to Titer/Pattern; Future  -     Lyme Antibody Profile with reflex to WB; Future  -     RF Screen w/ Reflex to Titer; Future  -     Sedimentation rate, automated; Future  -     Uric acid; Future  -     NT-BNP PRO; Future    Lyme borreliosis  -     Lyme Antibody Profile with reflex to WB; Future    Chronic diastolic congestive heart failure (HCC)  -     CBC and differential; Future  -     Comprehensive metabolic panel; Future  -     YEE Screen w/ Reflex to Titer/Pattern; Future  -     Lyme Antibody Profile with reflex to WB; Future  -     RF Screen w/ Reflex to Titer; Future  -     Sedimentation rate, automated; Future  -     Uric acid; Future  -     NT-BNP PRO; Future    Neuropathy  -     hydroxychloroquine (PLAQUENIL) 200 mg tablet; Take 1 tablet (200 mg total) by mouth 2 (two) times a day with meals for 90 days          Patient Instructions   A patient with chronic poly arthropathic pain  Associated component of myalgia or neuropathic pain  He has failed antibiotic with working diagnosis of Lyme disease    He received a month of doxycycline in February of this year and the month Rocephin in June  Therapeutic trial of Plaquenil 200 mg p o  B i d  With working diagnosis of seronegative rheumatoid arthritis    Do not take Aleve or Advil  This would be bad for your heart  Okay    See me back here after the new year        Subjective:      Patient ID: Barrett Matos is a 76 y o  male  Diffuse joint pain  A 17-year-old man who has been complaining of diffuse joint pains for number of years  I had seen him back in the early part of 2018  Exam was positive for polyarthropathy  Laboratory testing was positive for Lyme disease  He had multiple positive bands  He was given doxycycline for 1 month which was unsuccessful  He then found his way to a Lyme disease specialist [address] [place] [address]  who gave him Rocephin IV daily for 1 month  This too was unsuccessful in alleviating the pain  He states that while he was on the infusion itself he had perhaps 30% pain relief but as soon as the infusions were  The pain returned full blast     In the middle of all this he fell off a ladder, dislocated left shoulder, and broke 3 ribs  So, he had that traumatic pain to deal with  He went for a corrective surgery on the left shoulder and now the traumatic pain appears to be resolved but he is still left with a diffuse aching in the small joints of the fingers and hands, plus also in the ankles and knees  Additionally, he complains of aching in the calf and burning in the calf; differential diagnosis here of myalgic pain versus neuropathic pain  Comorbidity of chronic obstructive lung disease, essential hypertension, chronic systolic dysfunction without active heart failure  A recent echocardiogram documented EF 40% with no clinical features of heart failure  Chronic lung disease has been treated by Dr Jada Burch and appears to be doing fairly well  He is wheeze free today          The following portions of the patient's history were reviewed and updated as appropriate:   He has a past medical history of Nonischemic cardiomyopathy ([facility] [address] )  ,   does not have any pertinent problems on file  ,   has a past surgical history that includes Cardiac catheterization and Lipoma resection  ,  family history includes No Known Problems in his father and mother  He was adopted  ,   reports that he quit smoking about 20 years ago  He has never used smokeless tobacco  He reports that he drinks about 3 6 oz of alcohol per week   He reports that he does not use drugs  ,  is allergic to pollen extract     Current Outpatient Prescriptions   Medication Sig Dispense Refill    albuterol (PROVENTIL HFA,VENTOLIN HFA) 90 mcg/act inhaler 2 puff(s)      amLODIPine (NORVASC) 10 mg tablet Take 1 tablet (10 mg total) by mouth daily 90 tablet 3    BREO ELLIPTA 200-25 MCG/INH inhaler       carvedilol (COREG) 12 5 mg tablet Take 1 tablet (12 5 mg total) by mouth 2 (two) times a day 90 tablet 3    citalopram (CeleXA) 20 mg tablet 1 tab(s)      donepezil (ARICEPT) 10 mg tablet Take 1 tablet by mouth daily      fexofenadine (ALLEGRA) 180 MG tablet 1 tab(s)      fluticasone (FLONASE) 50 mcg/act nasal spray 2 spray(s)      furosemide (LASIX) 20 mg tablet Take 1 tablet by mouth daily      isosorbide mononitrate (IMDUR) 30 mg 24 hr tablet Take 1 tablet by mouth daily      lisinopril (ZESTRIL) 20 mg tablet Take 1 tablet (20 mg total) by mouth daily 90 tablet 3    montelukast (SINGULAIR) 10 mg tablet 1 tab(s)      potassium chloride (K-DUR) 10 mEq tablet Take 1 tablet by mouth 2 (two) times a day      rOPINIRole (REQUIP) 0 5 mg tablet Take 1 tablet (0 5 mg total) by mouth daily at bedtime 90 tablet 0    traZODone (DESYREL) 50 mg tablet Take 50 mg by mouth daily at bedtime      aspirin 81 MG tablet Take 1 tablet by mouth daily      hydroxychloroquine (PLAQUENIL) 200 mg tablet Take 1 tablet (200 mg total) by mouth 2 (two) times a day with meals for 90 days 60 tablet 3    nitroglycerin (NITROSTAT) 0 4 mg SL tablet Place 1 tablet under the tongue No current facility-administered medications for this visit  Review of Systems   Constitutional: Negative for chills and fever  HENT: Negative for sore throat and trouble swallowing  Eyes: Negative for pain  Respiratory: Negative for cough and shortness of breath  Cardiovascular: Negative for chest pain and palpitations  Gastrointestinal: Negative for abdominal pain, blood in stool, diarrhea, nausea and vomiting  Endocrine: Negative for cold intolerance and heat intolerance  Genitourinary: Negative for dysuria, frequency and testicular pain  Musculoskeletal: Positive for arthralgias, joint swelling and myalgias  Skin: Negative for rash and wound  Allergic/Immunologic: Negative for immunocompromised state  Neurological: Positive for numbness  Negative for dizziness, syncope and headaches  Hematological: Negative for adenopathy  Does not bruise/bleed easily  Psychiatric/Behavioral: Negative for dysphoric mood  The patient is not nervous/anxious  Objective:  Vitals:    12/05/18 0830   BP: 120/78   Pulse: 82   SpO2: 97%      Physical Exam   Constitutional: He is oriented to person, place, and time  He appears well-developed and well-nourished  A patient who appears stated age, verbalizing complaint of pain  HENT:   Head: Normocephalic and atraumatic  Eyes: Pupils are equal, round, and reactive to light  Neck: Normal range of motion  Neck supple  No tracheal deviation present  No thyromegaly present  Cardiovascular: Normal rate, regular rhythm and normal heart sounds  Exam reveals no gallop  No murmur heard  Pulmonary/Chest: Effort normal  No respiratory distress  He has no wheezes  He has no rales  Abdominal: Soft  Bowel sounds are normal  There is no tenderness  Musculoskeletal: Normal range of motion  He exhibits deformity  He exhibits no tenderness     Sausage shaped fingers  Doughy swelling of the small joints of the fingers  Doughy swelling of the lateral and medial collateral ligaments of the knees  Trace edema of both ankles    No jefry fluid collections in the joints   Neurological: He is alert and oriented to person, place, and time  He has normal reflexes  Coordination normal    Skin: Skin is warm and dry  Psychiatric: He has a normal mood and affect

## 2018-12-06 ENCOUNTER — TELEPHONE (OUTPATIENT)
Dept: CARDIOLOGY CLINIC | Facility: CLINIC | Age: 68
End: 2018-12-06

## 2018-12-06 DIAGNOSIS — I42.9 CARDIOMYOPATHY, UNSPECIFIED TYPE (HCC): Primary | ICD-10-CM

## 2018-12-07 ENCOUNTER — TELEPHONE (OUTPATIENT)
Dept: CARDIOLOGY CLINIC | Facility: CLINIC | Age: 68
End: 2018-12-07

## 2018-12-07 NOTE — TELEPHONE ENCOUNTER
----- Message from Sasha Rivera MD sent at 12/6/2018  2:51 PM EST -----  Please let the patient know that the echocardiogram showed ejection fraction of 40% which is slightly reduced  When compared to previous echo, it is lower  Continue present medications  Strict salt restriction  Repeat limited echocardiogram to be done in 3 months  I will put in the order in the system  Please let the patient know

## 2018-12-12 ENCOUNTER — TELEPHONE (OUTPATIENT)
Dept: INTERNAL MEDICINE CLINIC | Facility: CLINIC | Age: 68
End: 2018-12-12

## 2018-12-17 ENCOUNTER — APPOINTMENT (OUTPATIENT)
Dept: LAB | Facility: CLINIC | Age: 68
End: 2018-12-17
Payer: MEDICARE

## 2018-12-17 DIAGNOSIS — G62.9 NEUROPATHY: ICD-10-CM

## 2018-12-17 DIAGNOSIS — I10 HYPERTENSION, ESSENTIAL: ICD-10-CM

## 2018-12-17 DIAGNOSIS — A69.20 LYME BORRELIOSIS: ICD-10-CM

## 2018-12-17 DIAGNOSIS — M19.90 INFLAMMATORY ARTHROPATHY: ICD-10-CM

## 2018-12-17 DIAGNOSIS — I50.32 CHRONIC DIASTOLIC CONGESTIVE HEART FAILURE (HCC): ICD-10-CM

## 2018-12-17 DIAGNOSIS — M13.0 POLYARTHROPATHY: ICD-10-CM

## 2018-12-17 LAB
ALBUMIN SERPL BCP-MCNC: 3.2 G/DL (ref 3.5–5)
ALP SERPL-CCNC: 83 U/L (ref 46–116)
ALT SERPL W P-5'-P-CCNC: 33 U/L (ref 12–78)
ANION GAP SERPL CALCULATED.3IONS-SCNC: 5 MMOL/L (ref 4–13)
AST SERPL W P-5'-P-CCNC: 18 U/L (ref 5–45)
BASOPHILS # BLD AUTO: 0.05 THOUSANDS/ΜL (ref 0–0.1)
BASOPHILS NFR BLD AUTO: 1 % (ref 0–1)
BILIRUB SERPL-MCNC: 0.38 MG/DL (ref 0.2–1)
BUN SERPL-MCNC: 15 MG/DL (ref 5–25)
CALCIUM SERPL-MCNC: 9.1 MG/DL (ref 8.3–10.1)
CHLORIDE SERPL-SCNC: 106 MMOL/L (ref 100–108)
CK SERPL-CCNC: 100 U/L (ref 39–308)
CO2 SERPL-SCNC: 27 MMOL/L (ref 21–32)
CREAT SERPL-MCNC: 1.34 MG/DL (ref 0.6–1.3)
EOSINOPHIL # BLD AUTO: 0.68 THOUSAND/ΜL (ref 0–0.61)
EOSINOPHIL NFR BLD AUTO: 10 % (ref 0–6)
ERYTHROCYTE [DISTWIDTH] IN BLOOD BY AUTOMATED COUNT: 12 % (ref 11.6–15.1)
ERYTHROCYTE [SEDIMENTATION RATE] IN BLOOD: 25 MM/HOUR (ref 0–10)
GFR SERPL CREATININE-BSD FRML MDRD: 54 ML/MIN/1.73SQ M
GLUCOSE P FAST SERPL-MCNC: 101 MG/DL (ref 65–99)
HCT VFR BLD AUTO: 47 % (ref 36.5–49.3)
HGB BLD-MCNC: 15.7 G/DL (ref 12–17)
IMM GRANULOCYTES # BLD AUTO: 0.01 THOUSAND/UL (ref 0–0.2)
IMM GRANULOCYTES NFR BLD AUTO: 0 % (ref 0–2)
LYMPHOCYTES # BLD AUTO: 2.06 THOUSANDS/ΜL (ref 0.6–4.47)
LYMPHOCYTES NFR BLD AUTO: 29 % (ref 14–44)
MCH RBC QN AUTO: 29.9 PG (ref 26.8–34.3)
MCHC RBC AUTO-ENTMCNC: 33.4 G/DL (ref 31.4–37.4)
MCV RBC AUTO: 90 FL (ref 82–98)
MONOCYTES # BLD AUTO: 0.64 THOUSAND/ΜL (ref 0.17–1.22)
MONOCYTES NFR BLD AUTO: 9 % (ref 4–12)
NEUTROPHILS # BLD AUTO: 3.6 THOUSANDS/ΜL (ref 1.85–7.62)
NEUTS SEG NFR BLD AUTO: 51 % (ref 43–75)
NRBC BLD AUTO-RTO: 0 /100 WBCS
NT-PROBNP SERPL-MCNC: 609 PG/ML
PLATELET # BLD AUTO: 278 THOUSANDS/UL (ref 149–390)
PMV BLD AUTO: 10.5 FL (ref 8.9–12.7)
POTASSIUM SERPL-SCNC: 4 MMOL/L (ref 3.5–5.3)
PROT SERPL-MCNC: 7.7 G/DL (ref 6.4–8.2)
RBC # BLD AUTO: 5.25 MILLION/UL (ref 3.88–5.62)
SODIUM SERPL-SCNC: 138 MMOL/L (ref 136–145)
URATE SERPL-MCNC: 6.7 MG/DL (ref 4.2–8)
VIT B12 SERPL-MCNC: 308 PG/ML (ref 100–900)
WBC # BLD AUTO: 7.04 THOUSAND/UL (ref 4.31–10.16)

## 2018-12-17 PROCEDURE — 84550 ASSAY OF BLOOD/URIC ACID: CPT

## 2018-12-17 PROCEDURE — 85025 COMPLETE CBC W/AUTO DIFF WBC: CPT

## 2018-12-17 PROCEDURE — 82607 VITAMIN B-12: CPT

## 2018-12-17 PROCEDURE — 82550 ASSAY OF CK (CPK): CPT

## 2018-12-17 PROCEDURE — 36415 COLL VENOUS BLD VENIPUNCTURE: CPT

## 2018-12-17 PROCEDURE — 86617 LYME DISEASE ANTIBODY: CPT

## 2018-12-17 PROCEDURE — 86430 RHEUMATOID FACTOR TEST QUAL: CPT

## 2018-12-17 PROCEDURE — 83880 ASSAY OF NATRIURETIC PEPTIDE: CPT

## 2018-12-17 PROCEDURE — 86618 LYME DISEASE ANTIBODY: CPT

## 2018-12-17 PROCEDURE — 80053 COMPREHEN METABOLIC PANEL: CPT

## 2018-12-17 PROCEDURE — 86038 ANTINUCLEAR ANTIBODIES: CPT

## 2018-12-17 PROCEDURE — 85652 RBC SED RATE AUTOMATED: CPT

## 2018-12-17 PROCEDURE — 82085 ASSAY OF ALDOLASE: CPT

## 2018-12-18 LAB
ALDOLASE SERPL-CCNC: 4.9 U/L (ref 3.3–10.3)
B BURGDOR IGG SER IA-ACNC: 3.36
B BURGDOR IGM SER IA-ACNC: 0.3
RHEUMATOID FACT SER QL LA: NEGATIVE

## 2018-12-19 LAB
B BURGDOR IGG PATRN SER IB-IMP: POSITIVE
B BURGDOR IGM PATRN SER IB-IMP: NEGATIVE
B BURGDOR18KD IGG SER QL IB: PRESENT
B BURGDOR23KD IGG SER QL IB: PRESENT
B BURGDOR23KD IGM SER QL IB: ABNORMAL
B BURGDOR28KD IGG SER QL IB: PRESENT
B BURGDOR30KD IGG SER QL IB: ABNORMAL
B BURGDOR39KD IGG SER QL IB: PRESENT
B BURGDOR39KD IGM SER QL IB: ABNORMAL
B BURGDOR41KD IGG SER QL IB: PRESENT
B BURGDOR41KD IGM SER QL IB: ABNORMAL
B BURGDOR45KD IGG SER QL IB: PRESENT
B BURGDOR58KD IGG SER QL IB: PRESENT
B BURGDOR66KD IGG SER QL IB: PRESENT
B BURGDOR93KD IGG SER QL IB: PRESENT
RYE IGE QN: NEGATIVE

## 2019-01-07 ENCOUNTER — TELEPHONE (OUTPATIENT)
Dept: INTERNAL MEDICINE CLINIC | Facility: CLINIC | Age: 69
End: 2019-01-07

## 2019-01-07 NOTE — TELEPHONE ENCOUNTER
Rafael Bai from [facility]- Dr Lorenzo Ibanez called asking for patient's recent lab work up-     [phone]    Labs from 12/17/2018 faxed

## 2019-01-08 ENCOUNTER — OFFICE VISIT (OUTPATIENT)
Dept: INTERNAL MEDICINE CLINIC | Facility: CLINIC | Age: 69
End: 2019-01-08
Payer: MEDICARE

## 2019-01-08 VITALS
DIASTOLIC BLOOD PRESSURE: 72 MMHG | HEIGHT: 66 IN | OXYGEN SATURATION: 97 % | SYSTOLIC BLOOD PRESSURE: 146 MMHG | HEART RATE: 59 BPM | BODY MASS INDEX: 33.16 KG/M2 | WEIGHT: 206.3 LBS

## 2019-01-08 DIAGNOSIS — M13.0 POLYARTHROPATHY: ICD-10-CM

## 2019-01-08 DIAGNOSIS — G62.9 NEUROPATHY: ICD-10-CM

## 2019-01-08 DIAGNOSIS — I10 HYPERTENSION, ESSENTIAL: Primary | ICD-10-CM

## 2019-01-08 DIAGNOSIS — M19.90 INFLAMMATORY ARTHROPATHY: ICD-10-CM

## 2019-01-08 DIAGNOSIS — I50.32 CHRONIC DIASTOLIC CONGESTIVE HEART FAILURE (HCC): ICD-10-CM

## 2019-01-08 DIAGNOSIS — A69.20 LYME BORRELIOSIS: ICD-10-CM

## 2019-01-08 PROCEDURE — 99214 OFFICE O/P EST MOD 30 MIN: CPT | Performed by: INTERNAL MEDICINE

## 2019-01-08 RX ORDER — HYDROXYCHLOROQUINE SULFATE 200 MG/1
200 TABLET, FILM COATED ORAL 2 TIMES DAILY WITH MEALS
Qty: 180 TABLET | Refills: 3 | Status: SHIPPED | OUTPATIENT
Start: 2019-01-08 | End: 2020-03-04 | Stop reason: ALTCHOICE

## 2019-01-08 RX ORDER — DOXYCYCLINE HYCLATE 100 MG/1
CAPSULE ORAL
COMMUNITY
Start: 2019-01-07 | End: 2020-02-12

## 2019-01-08 NOTE — PATIENT INSTRUCTIONS
Significant improvement in joint pain with Plaquenil 200 mg twice a day  This therapy can be continued but the patient knows to follow with his eye doctor at least twice a year  Cardiomyopathy and COPD are followed by appropriate specialists    See me again in 6 months; call if problems develop

## 2019-01-08 NOTE — PROGRESS NOTES
Assessment/Plan:       Diagnoses and all orders for this visit:    Hypertension, essential    Lyme borreliosis    Inflammatory arthropathy  -     hydroxychloroquine (PLAQUENIL) 200 mg tablet; Take 1 tablet (200 mg total) by mouth 2 (two) times a day with meals for 90 days    Chronic diastolic congestive heart failure (HCC)    Polyarthropathy  -     hydroxychloroquine (PLAQUENIL) 200 mg tablet; Take 1 tablet (200 mg total) by mouth 2 (two) times a day with meals for 90 days    Neuropathy  -     hydroxychloroquine (PLAQUENIL) 200 mg tablet; Take 1 tablet (200 mg total) by mouth 2 (two) times a day with meals for 90 days    Other orders  -     doxycycline hyclate (VIBRAMYCIN) 100 mg capsule; Patient Instructions   Significant improvement in joint pain with Plaquenil 200 mg twice a day  This therapy can be continued but the patient knows to follow with his eye doctor at least twice a year  Cardiomyopathy and COPD are followed by appropriate specialists    See me again in 6 months; call if problems develop  Subjective:      Patient ID: Phil Deng is a 76 y o  male  Follow-up visit the patient with multiple chronic problems  The immediate issue was the presence of a diffuse polyarthropathy  As documented in prior notes, the patient had been given a working diagnosis of Lyme disease but antibiotic therapy both orally and intravenously failed completely  So, decision was made to give him a therapeutic trial of Plaquenil for the past month and a revisit today for a follow-up to see if it is done any good  The patient tells us that taking Plaquenil twice a day on a regular basis has given him a significant reduction in pain  So, this treatment is working  There were really no inflammatory markers noted    So, whether we are treating seronegative rheumatoid arthritis or seronegative Lyme disease with an anti-inflammatory drug is still an open question but the treatment is working    Chronic diagnoses included nonischemic cardiomyopathy with low output syndrome  However, more recent studies including both stress testing and echocardiography, this last done in June 2017, document recovery of EF to 55%  Hypertension, hyperlipidemia, and chronic obstructive pulmonary disease  The nonischemic cardiomyopathy history dates from about 5 years  There was no etiology determined  Last year he complained of feeling a global sense of fatigue  He was sent for a nuclear stress test  The nuclear stress test shows normal ejection fraction of 54% however it also documents inferior wall hypokinesis and a defect in the septal area with reperfusion at rest highly suggestive of myocardial ischemia      COPD secondary to former smoking is significant and he follows with Dr Timothy Reed  This is stable    In fact, he tells me that the pulmonologist says his function is normal      MGUS   This is followed by hematology oncology      Bilateral calf aching pain worse at night not exertional which to me is suspicious for lumbar spinal stenosis with radiculopathic pain  His pulses are bounding; clearly not PVD  This too has improved over the past month and perhaps the Plaquenil played a role here also        The following portions of the patient's history were reviewed and updated as appropriate:   He has a past medical history of Nonischemic cardiomyopathy ([address] )  ,   does not have any pertinent problems on file  ,   has a past surgical history that includes Cardiac catheterization and Lipoma resection  ,  family history includes No Known Problems in his father and mother  He was adopted  ,   reports that he quit smoking about 20 years ago  He has a 20 00 pack-year smoking history  He has never used smokeless tobacco  He reports that he drinks about 3 6 oz of alcohol per week   He reports that he does not use drugs  ,  is allergic to pollen extract     Current Outpatient Prescriptions   Medication Sig Dispense Refill    albuterol (PROVENTIL HFA,VENTOLIN HFA) 90 mcg/act inhaler 2 puff(s)      amLODIPine (NORVASC) 10 mg tablet Take 1 tablet (10 mg total) by mouth daily 90 tablet 3    BREO ELLIPTA 200-25 MCG/INH inhaler       carvedilol (COREG) 12 5 mg tablet Take 1 tablet (12 5 mg total) by mouth 2 (two) times a day 90 tablet 3    citalopram (CeleXA) 20 mg tablet 1 tab(s)      donepezil (ARICEPT) 10 mg tablet Take 1 tablet by mouth daily      doxycycline hyclate (VIBRAMYCIN) 100 mg capsule       fexofenadine (ALLEGRA) 180 MG tablet 1 tab(s)      fluticasone (FLONASE) 50 mcg/act nasal spray 2 spray(s)      furosemide (LASIX) 20 mg tablet Take 1 tablet by mouth daily      hydroxychloroquine (PLAQUENIL) 200 mg tablet Take 1 tablet (200 mg total) by mouth 2 (two) times a day with meals for 90 days 180 tablet 3    isosorbide mononitrate (IMDUR) 30 mg 24 hr tablet Take 1 tablet by mouth daily      lisinopril (ZESTRIL) 20 mg tablet Take 1 tablet (20 mg total) by mouth daily 90 tablet 3    montelukast (SINGULAIR) 10 mg tablet 1 tab(s)      nitroglycerin (NITROSTAT) 0 4 mg SL tablet Place 1 tablet under the tongue      potassium chloride (K-DUR) 10 mEq tablet Take 1 tablet by mouth 2 (two) times a day      rOPINIRole (REQUIP) 0 5 mg tablet Take 1 tablet (0 5 mg total) by mouth daily at bedtime 90 tablet 0    traZODone (DESYREL) 50 mg tablet Take 50 mg by mouth daily at bedtime      aspirin 81 MG tablet Take 1 tablet by mouth daily       No current facility-administered medications for this visit          Review of Systems      Objective:  Vitals:    01/08/19 0824   BP: 146/72   Pulse: 59   SpO2: 97%      Physical Exam

## 2019-01-16 DIAGNOSIS — I10 ESSENTIAL HYPERTENSION: Primary | ICD-10-CM

## 2019-01-16 RX ORDER — POTASSIUM CHLORIDE 750 MG/1
10 TABLET, FILM COATED, EXTENDED RELEASE ORAL 2 TIMES DAILY
Qty: 180 TABLET | Refills: 3 | Status: SHIPPED | OUTPATIENT
Start: 2019-01-16 | End: 2020-03-30

## 2019-04-09 DIAGNOSIS — G25.81 RESTLESS LEG: ICD-10-CM

## 2019-04-09 RX ORDER — ROPINIROLE 0.5 MG/1
0.5 TABLET, FILM COATED ORAL
Qty: 90 TABLET | Refills: 0 | Status: SHIPPED | OUTPATIENT
Start: 2019-04-09 | End: 2019-07-07 | Stop reason: SDUPTHER

## 2019-05-15 ENCOUNTER — OFFICE VISIT (OUTPATIENT)
Dept: CARDIOLOGY CLINIC | Facility: CLINIC | Age: 69
End: 2019-05-15
Payer: MEDICARE

## 2019-05-15 VITALS
OXYGEN SATURATION: 98 % | HEIGHT: 66 IN | BODY MASS INDEX: 30.95 KG/M2 | DIASTOLIC BLOOD PRESSURE: 84 MMHG | WEIGHT: 192.6 LBS | SYSTOLIC BLOOD PRESSURE: 122 MMHG | HEART RATE: 73 BPM

## 2019-05-15 DIAGNOSIS — I25.10 CORONARY ARTERY DISEASE INVOLVING NATIVE CORONARY ARTERY OF NATIVE HEART WITHOUT ANGINA PECTORIS: Primary | ICD-10-CM

## 2019-05-15 DIAGNOSIS — I42.9 CARDIOMYOPATHY, UNSPECIFIED TYPE (HCC): ICD-10-CM

## 2019-05-15 DIAGNOSIS — I50.42 CHRONIC COMBINED SYSTOLIC AND DIASTOLIC CONGESTIVE HEART FAILURE (HCC): ICD-10-CM

## 2019-05-15 DIAGNOSIS — I10 HYPERTENSION, ESSENTIAL: ICD-10-CM

## 2019-05-15 DIAGNOSIS — I10 HYPERTENSION, UNSPECIFIED TYPE: ICD-10-CM

## 2019-05-15 PROCEDURE — 99213 OFFICE O/P EST LOW 20 MIN: CPT | Performed by: INTERNAL MEDICINE

## 2019-05-15 RX ORDER — CARVEDILOL 12.5 MG/1
12.5 TABLET ORAL 2 TIMES DAILY
Qty: 180 TABLET | Refills: 3 | Status: SHIPPED | OUTPATIENT
Start: 2019-05-15 | End: 2020-03-30 | Stop reason: ALTCHOICE

## 2019-05-15 RX ORDER — AMLODIPINE BESYLATE 10 MG/1
10 TABLET ORAL DAILY
Qty: 90 TABLET | Refills: 3 | Status: SHIPPED | OUTPATIENT
Start: 2019-05-15 | End: 2020-03-30

## 2019-05-15 RX ORDER — LISINOPRIL 20 MG/1
20 TABLET ORAL DAILY
Qty: 90 TABLET | Refills: 3 | Status: SHIPPED | OUTPATIENT
Start: 2019-05-15 | End: 2020-03-30 | Stop reason: SDUPTHER

## 2019-06-15 ENCOUNTER — TRANSCRIBE ORDERS (OUTPATIENT)
Dept: LAB | Facility: CLINIC | Age: 69
End: 2019-06-15

## 2019-06-15 DIAGNOSIS — A69.20 LYME DISEASE: Primary | ICD-10-CM

## 2019-06-17 ENCOUNTER — APPOINTMENT (OUTPATIENT)
Dept: LAB | Facility: CLINIC | Age: 69
End: 2019-06-17
Payer: MEDICARE

## 2019-06-17 DIAGNOSIS — A69.20 LYME DISEASE: ICD-10-CM

## 2019-06-17 LAB
CK SERPL-CCNC: 106 U/L (ref 39–308)
CORTIS AM PEAK SERPL-MCNC: 14.9 UG/DL (ref 4.2–22.4)
T4 FREE SERPL-MCNC: 0.93 NG/DL (ref 0.76–1.46)
TSH SERPL DL<=0.05 MIU/L-ACNC: 1.59 UIU/ML (ref 0.36–3.74)

## 2019-06-17 PROCEDURE — 84443 ASSAY THYROID STIM HORMONE: CPT

## 2019-06-17 PROCEDURE — 36415 COLL VENOUS BLD VENIPUNCTURE: CPT

## 2019-06-17 PROCEDURE — 82024 ASSAY OF ACTH: CPT

## 2019-06-17 PROCEDURE — 82533 TOTAL CORTISOL: CPT

## 2019-06-17 PROCEDURE — 84439 ASSAY OF FREE THYROXINE: CPT

## 2019-06-17 PROCEDURE — 82550 ASSAY OF CK (CPK): CPT

## 2019-06-18 LAB — ACTH PLAS-MCNC: 23.1 PG/ML (ref 7.2–63.3)

## 2019-07-07 DIAGNOSIS — G25.81 RESTLESS LEG: ICD-10-CM

## 2019-07-08 RX ORDER — ROPINIROLE 0.5 MG/1
TABLET, FILM COATED ORAL
Qty: 90 TABLET | Refills: 0 | Status: SHIPPED | OUTPATIENT
Start: 2019-07-08 | End: 2020-04-24 | Stop reason: SDUPTHER

## 2019-08-10 ENCOUNTER — TELEPHONE (OUTPATIENT)
Dept: INTERNAL MEDICINE CLINIC | Facility: CLINIC | Age: 69
End: 2019-08-10

## 2019-08-10 ENCOUNTER — OFFICE VISIT (OUTPATIENT)
Dept: INTERNAL MEDICINE CLINIC | Facility: CLINIC | Age: 69
End: 2019-08-10
Payer: MEDICARE

## 2019-08-10 VITALS
SYSTOLIC BLOOD PRESSURE: 150 MMHG | DIASTOLIC BLOOD PRESSURE: 82 MMHG | BODY MASS INDEX: 31.02 KG/M2 | WEIGHT: 193 LBS | OXYGEN SATURATION: 94 % | HEART RATE: 74 BPM | HEIGHT: 66 IN

## 2019-08-10 DIAGNOSIS — Z12.5 PROSTATE CANCER SCREENING: Primary | ICD-10-CM

## 2019-08-10 DIAGNOSIS — Z00.00 HEALTH CARE MAINTENANCE: ICD-10-CM

## 2019-08-10 DIAGNOSIS — Z12.11 COLON CANCER SCREENING: ICD-10-CM

## 2019-08-10 DIAGNOSIS — I10 HYPERTENSION, ESSENTIAL: ICD-10-CM

## 2019-08-10 DIAGNOSIS — I50.32 CHRONIC DIASTOLIC CONGESTIVE HEART FAILURE (HCC): ICD-10-CM

## 2019-08-10 PROCEDURE — G0439 PPPS, SUBSEQ VISIT: HCPCS | Performed by: INTERNAL MEDICINE

## 2019-08-10 NOTE — PATIENT INSTRUCTIONS
Annual wellness visit   Colonoscopy up-to-date  Screening labs including PSA needs to be done   Six-month follow-up

## 2019-08-10 NOTE — PROGRESS NOTES
Assessment and Plan:     Problem List Items Addressed This Visit        Cardiovascular and Mediastinum    Congestive heart failure (HCC)    Relevant Orders    CBC and differential    Lipid Panel with Direct LDL reflex    Comprehensive metabolic panel    TSH, 3rd generation with Free T4 reflex    Urinalysis with reflex to microscopic    Hypertension, essential    Relevant Orders    CBC and differential    Lipid Panel with Direct LDL reflex    Comprehensive metabolic panel    TSH, 3rd generation with Free T4 reflex    Urinalysis with reflex to microscopic       Other    Health care maintenance    Relevant Orders    Ambulatory referral to Ophthalmology    Prostate cancer screening - Primary    Relevant Orders    PSA, Total Screen    Colon cancer screening         History of Present Illness:     Patient presents for Medicare Annual Wellness visit    Patient Care Team:  Geeta Singleton MD as PCP - General  MD Joseph Jordan PA-C     Problem List:     Patient Active Problem List   Diagnosis    Anxiety associated with depression    Chronic obstructive pulmonary disease (HCC)    Congestive heart failure (HCC)    Coronary artery disease    High cholesterol    Hypertension, essential    Insomnia    Lyme borreliosis    Polyarthropathy    Neuropathy    Myopathy    Inflammatory arthropathy    Health care maintenance    Prostate cancer screening    Colon cancer screening      Past Medical and Surgical History:     Past Medical History:   Diagnosis Date    Nonischemic cardiomyopathy ([address] )     last assessed 6/21/17; resolved 9/27/16     Past Surgical History:   Procedure Laterality Date    CARDIAC CATHETERIZATION      his ablation    LIPOMA RESECTION        Family History:     Family History   Adopted: Yes   Problem Relation Age of Onset    No Known Problems Mother     No Known Problems Father       Social History:     Social History     Tobacco Use   Smoking Status Former Smoker    Packs/day: 1 00    Years: 20 00    Pack years: 20 00    Last attempt to quit: 1998    Years since quittin 6   Smokeless Tobacco Never Used     Social History     Substance and Sexual Activity   Alcohol Use Yes    Alcohol/week: 6 0 standard drinks    Types: 6 Glasses of wine per week    Comment: with dinner     Social History     Substance and Sexual Activity   Drug Use No      Medications and Allergies:     Current Outpatient Medications   Medication Sig Dispense Refill    albuterol (PROVENTIL HFA,VENTOLIN HFA) 90 mcg/act inhaler 2 puff(s)      amLODIPine (NORVASC) 10 mg tablet Take 1 tablet (10 mg total) by mouth daily 90 tablet 3    BREO ELLIPTA 200-25 MCG/INH inhaler       carvedilol (COREG) 12 5 mg tablet Take 1 tablet (12 5 mg total) by mouth 2 (two) times a day 180 tablet 3    citalopram (CeleXA) 20 mg tablet 1 tab(s)      donepezil (ARICEPT) 10 mg tablet Take 1 tablet by mouth daily      doxycycline hyclate (VIBRAMYCIN) 100 mg capsule       fexofenadine (ALLEGRA) 180 MG tablet 1 tab(s)      fluticasone (FLONASE) 50 mcg/act nasal spray 2 spray(s)      furosemide (LASIX) 20 mg tablet Take 1 tablet by mouth daily      isosorbide mononitrate (IMDUR) 30 mg 24 hr tablet Take 1 tablet by mouth daily      lisinopril (ZESTRIL) 20 mg tablet Take 1 tablet (20 mg total) by mouth daily 90 tablet 3    nitroglycerin (NITROSTAT) 0 4 mg SL tablet Place 1 tablet under the tongue      potassium chloride (K-DUR) 10 mEq tablet Take 1 tablet (10 mEq total) by mouth 2 (two) times a day 180 tablet 3    rOPINIRole (REQUIP) 0 5 mg tablet TAKE 1 TABLET BY MOUTH ONCE DAILY AT BEDTIME 90 tablet 0    traZODone (DESYREL) 50 mg tablet Take 50 mg by mouth daily at bedtime      aspirin 81 MG tablet Take 1 tablet by mouth daily      hydroxychloroquine (PLAQUENIL) 200 mg tablet Take 1 tablet (200 mg total) by mouth 2 (two) times a day with meals for 90 days 180 tablet 3    montelukast (SINGULAIR) 10 mg tablet 1 tab(s) No current facility-administered medications for this visit  Allergies   Allergen Reactions    Pollen Extract Itching and Sneezing      Immunizations:     Immunization History   Administered Date(s) Administered    Influenza Split High Dose Preservative Free IM 10/19/2015    Pneumococcal Conjugate 13-Valent 10/19/2015, 01/10/2017    Pneumococcal Polysaccharide PPV23 1950, 04/03/2017    Tdap 1950    Zoster 1950      Medicare Screening Tests and Risk Assessments:     Paulion Butler is here for his Subsequent Wellness visit  Health Risk Assessment:  Patient rates overall health as fair  Patient feels that their physical health rating is Slightly worse  Eyesight was rated as Slightly worse  Hearing was rated as Same  Patient feels that their emotional and mental health rating is Same  Pain experienced by patient in the last 7 days has been A lot  Patient's pain rating has been 7/10  Patient states that he has experienced no weight loss or gain in last 6 months  Emotional/Mental Health:  Patient has been feeling nervous/anxious  PHQ-9 Depression Screening:    Frequency of the following problems over the past two weeks:      1  Little interest or pleasure in doing things: 1 - several days      2  Feeling down, depressed, or hopeless: 1 - several days  PHQ-2 Score: 2          Broken Bones/Falls: Fall Risk Assessment:    In the past year, patient has experienced: No history of falling in past year          Bladder/Bowel:  Patient has not leaked urine accidently in the last six months  Patient reports no loss of bowel control  Immunizations:  Patient has had a flu vaccination within the last year  Patient has received a pneumonia shot  Patient has not received a shingles shot  Patient has not received tetanus/diphtheria shot  Home Safety:  Patient does not have trouble with stairs inside or outside of their home     Patient currently reports that there are no safety hazards present in home, working smoke alarms, working carbon monoxide detectors  Preventative Screenings:   no prostate cancer screen performed, no colon cancer screen completed, cholesterol screen completed, no glaucoma eye exam completed    Nutrition:  Current diet: Regular with servings of the following:    Medications:  Patient is not currently taking any over-the-counter supplements  Patient is able to manage medications  Lifestyle Choices:  Patient reports no tobacco use  Patient has smoked or used tobacco in the past   Patient has stopped his tobacco use  Patient reports alcohol use  Patient drives a vehicle  Patient wears seat belt  Activities of Daily Living:  Can get out of bed by his or her self, able to dress self, able to make own meals, able to do own shopping, able to bathe self, can do own laundry/housekeeping, can manage own money, pay bills and track expenses    Previous Hospitalizations:  No hospitalization or ED visit in past 12 months        Advanced Directives:  Patient has decided on a power of   Patient has spoken to designated power of   Patient has not completed advanced directive  Preventative Screening/Counseling:      Cardiovascular:      General: Screening Current          Diabetes:      General: Screening Current          Colorectal Cancer:      General: Screening Current          Prostate Cancer:      Due for labs: PSA          Osteoporosis:      Counseling: Calcium and Vitamin D Intake          AAA:      General: Screening Not Indicated          Glaucoma:      Referrals: Ophthalmology          HIV:      General: Screening Not Indicated          Hepatitis C:      General: Risks and Benefits Discussed        Advanced Directives:   Patient has living will for healthcare, does not have durable POA for healthcare, patient has an advanced directive  Information on ACP and/or AD provided  5 wishes given  End of life assessment reviewed with patient  Provider agrees with end of life decisions

## 2020-02-12 ENCOUNTER — OFFICE VISIT (OUTPATIENT)
Dept: CARDIOLOGY CLINIC | Facility: CLINIC | Age: 70
End: 2020-02-12
Payer: MEDICARE

## 2020-02-12 VITALS
DIASTOLIC BLOOD PRESSURE: 74 MMHG | WEIGHT: 194 LBS | HEIGHT: 66 IN | SYSTOLIC BLOOD PRESSURE: 150 MMHG | BODY MASS INDEX: 31.18 KG/M2 | HEART RATE: 79 BPM | OXYGEN SATURATION: 97 %

## 2020-02-12 DIAGNOSIS — R07.89 CHEST DISCOMFORT: Primary | ICD-10-CM

## 2020-02-12 DIAGNOSIS — I25.10 CORONARY ARTERY DISEASE INVOLVING NATIVE CORONARY ARTERY OF NATIVE HEART WITHOUT ANGINA PECTORIS: ICD-10-CM

## 2020-02-12 DIAGNOSIS — I50.32 CHRONIC DIASTOLIC CONGESTIVE HEART FAILURE (HCC): ICD-10-CM

## 2020-02-12 DIAGNOSIS — Z01.810 PRE-OPERATIVE CARDIOVASCULAR EXAMINATION: ICD-10-CM

## 2020-02-12 DIAGNOSIS — I10 HYPERTENSION, ESSENTIAL: ICD-10-CM

## 2020-02-12 DIAGNOSIS — I42.9 CARDIOMYOPATHY, UNSPECIFIED TYPE (HCC): ICD-10-CM

## 2020-02-12 PROCEDURE — 93000 ELECTROCARDIOGRAM COMPLETE: CPT | Performed by: INTERNAL MEDICINE

## 2020-02-12 PROCEDURE — 4040F PNEUMOC VAC/ADMIN/RCVD: CPT | Performed by: INTERNAL MEDICINE

## 2020-02-12 PROCEDURE — 1160F RVW MEDS BY RX/DR IN RCRD: CPT | Performed by: INTERNAL MEDICINE

## 2020-02-12 PROCEDURE — 3078F DIAST BP <80 MM HG: CPT | Performed by: INTERNAL MEDICINE

## 2020-02-12 PROCEDURE — 99214 OFFICE O/P EST MOD 30 MIN: CPT | Performed by: INTERNAL MEDICINE

## 2020-02-12 PROCEDURE — 1036F TOBACCO NON-USER: CPT | Performed by: INTERNAL MEDICINE

## 2020-02-12 PROCEDURE — 3077F SYST BP >= 140 MM HG: CPT | Performed by: INTERNAL MEDICINE

## 2020-02-12 PROCEDURE — 3008F BODY MASS INDEX DOCD: CPT | Performed by: INTERNAL MEDICINE

## 2020-02-12 NOTE — H&P (VIEW-ONLY)
[facility] [address] [facility] [place] [address]  Cardiology Follow Up    Donzell Merlin  1950  [id number]      1  Chest discomfort  POCT ECG   2  Hypertension, essential     3  Coronary artery disease involving native coronary artery of native heart without angina pectoris     4  Cardiomyopathy, unspecified type ([facility] [address] )     5  Chronic diastolic congestive heart failure [facility])         Chief Complaint   Patient presents with    Cardiac Risk Assessment     THORACIC VIDEO-ASSISTED LOBECTOMY/Mediastinal Lymph Node Dissection w/ / Ryan Garcia  Scheduled 2/18/2020       Interval History:  Patient presents for preoperative cardiovascular evaluation for lung surgery  Patient has been diagnosed to have lung cancer  Patient does have history of cardiomyopathy in the past   Patient does have some shortness of breath  Patient denies any history of chest pain presently  No history of leg edema orthopnea PND  Patient has not had any recent cardiac evaluation  Patient's activities pretty limited because of shortness of breath      Patient Active Problem List   Diagnosis    Anxiety associated with depression    Chronic obstructive pulmonary disease (HCC)    Congestive heart failure (HCC)    Coronary artery disease    High cholesterol    Hypertension, essential    Insomnia    Lyme borreliosis    Polyarthropathy    Neuropathy    Myopathy    Inflammatory arthropathy    Health care maintenance    Prostate cancer screening    Colon cancer screening     Past Medical History:   Diagnosis Date    Nonischemic cardiomyopathy ([facility] [address] )     last assessed 6/21/17; resolved 9/27/16     Social History     Socioeconomic History    Marital status: /Civil Union     Spouse name: Not on file    Number of children: Not on file    Years of education: Not on file    Highest education level: Not on file   Occupational History    Occupation: retired   Social Needs    Financial resource strain: Not on file  Food insecurity:     Worry: Not on file     Inability: Not on file    Transportation needs:     Medical: Not on file     Non-medical: Not on file   Tobacco Use    Smoking status: Former Smoker     Packs/day: 1 00     Years: 20 00     Pack years: 20 00     Last attempt to quit: 1998     Years since quittin 2    Smokeless tobacco: Never Used   Substance and Sexual Activity    Alcohol use:  Yes     Alcohol/week: 6 0 standard drinks     Types: 6 Glasses of wine per week     Comment: with dinner    Drug use: No    Sexual activity: Yes     Partners: Male   Lifestyle    Physical activity:     Days per week: 7 days     Minutes per session: 30 min    Stress: Rather much   Relationships    Social connections:     Talks on phone: Not on file     Gets together: Not on file     Attends Zoroastrian service: Not on file     Active member of club or organization: Not on file     Attends meetings of clubs or organizations: Not on file     Relationship status: Not on file    Intimate partner violence:     Fear of current or ex partner: Not on file     Emotionally abused: Not on file     Physically abused: Not on file     Forced sexual activity: Not on file   Other Topics Concern    Not on file   Social History Narrative    Disabled        No advance directives      Family History   Adopted: Yes   Problem Relation Age of Onset    No Known Problems Mother     No Known Problems Father      Past Surgical History:   Procedure Laterality Date    CARDIAC CATHETERIZATION      his ablation    LIPOMA RESECTION         Current Outpatient Medications:     albuterol (PROVENTIL HFA,VENTOLIN HFA) 90 mcg/act inhaler, 2 puff(s), Disp: , Rfl:     amLODIPine (NORVASC) 10 mg tablet, Take 1 tablet (10 mg total) by mouth daily, Disp: 90 tablet, Rfl: 3    BREO ELLIPTA 200-25 MCG/INH inhaler, , Disp: , Rfl:     carvedilol (COREG) 12 5 mg tablet, Take 1 tablet (12 5 mg total) by mouth 2 (two) times a day, Disp: 180 tablet, Rfl: 3    citalopram (CeleXA) 20 mg tablet, 1 tab(s), Disp: , Rfl:     donepezil (ARICEPT) 10 mg tablet, Take 1 tablet by mouth daily, Disp: , Rfl:     fexofenadine (ALLEGRA) 180 MG tablet, 1 tab(s), Disp: , Rfl:     fluticasone (FLONASE) 50 mcg/act nasal spray, 2 spray(s), Disp: , Rfl:     furosemide (LASIX) 20 mg tablet, Take 1 tablet by mouth daily, Disp: , Rfl:     hydroxychloroquine (PLAQUENIL) 200 mg tablet, Take 1 tablet (200 mg total) by mouth 2 (two) times a day with meals for 90 days, Disp: 180 tablet, Rfl: 3    isosorbide mononitrate (IMDUR) 30 mg 24 hr tablet, Take 1 tablet by mouth daily, Disp: , Rfl:     lisinopril (ZESTRIL) 20 mg tablet, Take 1 tablet (20 mg total) by mouth daily, Disp: 90 tablet, Rfl: 3    montelukast (SINGULAIR) 10 mg tablet, 1 tab(s), Disp: , Rfl:     nitroglycerin (NITROSTAT) 0 4 mg SL tablet, Place 1 tablet under the tongue, Disp: , Rfl:     potassium chloride (K-DUR) 10 mEq tablet, Take 1 tablet (10 mEq total) by mouth 2 (two) times a day, Disp: 180 tablet, Rfl: 3    rOPINIRole (REQUIP) 0 5 mg tablet, TAKE 1 TABLET BY MOUTH ONCE DAILY AT BEDTIME, Disp: 90 tablet, Rfl: 0    traZODone (DESYREL) 50 mg tablet, Take 50 mg by mouth daily at bedtime, Disp: , Rfl:   Allergies   Allergen Reactions    Pollen Extract Itching and Sneezing       Labs:  No visits with results within 2 Month(s) from this visit  Latest known visit with results is:   Appointment on 06/17/2019   Component Date Value    Cortisol - AM 06/17/2019 14 9     ACTH 06/17/2019 23 1     TSH 3RD GENERATON 06/17/2019 1 590     Free T4 06/17/2019 0 93     Total CK 06/17/2019 106      Imaging: No results found      Review of Systems:  Review of Systems    REVIEW OF SYSTEMS:  Constitutional:  Denies fever or chills   Eyes:  Denies change in visual acuity   HENT:  Denies nasal congestion or sore throat   Respiratory:   shortness of breath   Cardiovascular:  Denies chest pain or edema   GI:  Denies abdominal pain, nausea, vomiting, bloody stools or diarrhea   :  Denies dysuria, frequency, difficulty in micturition and nocturia  Musculoskeletal:  Denies back pain or joint pain   Neurologic:  Denies headache, focal weakness or sensory changes   Endocrine:  Denies polyuria or polydipsia   Lymphatic:  Denies swollen glands   Psychiatric:  Denies depression or anxiety         Physical Exam:    /74   Pulse 79   Ht 5' 5 5" (1 664 m)   Wt 88 kg (194 lb)   SpO2 97%   BMI 31 79 kg/m²     Physical Exam   PHYSICAL EXAM:  General:  Patient is not in acute distress   Head: Normocephalic, Atraumatic  HEENT:  Both pupils normal-size atraumatic, normocephalic, nonicteric  Neck:  JVP not raised  Trachea central  No carotid bruit  Respiratory:  Decreased breath sounds bilaterally  Cardiovascular:  Regular rate and rhythm no S3 no murmurs  GI:  Abdomen soft nontender  No organomegaly  Lymphatic:  No cervical or inguinal lymphadenopathy  Neurologic:  Patient is awake alert, oriented   Grossly nonfocal  Extremities no edema    Discussion/Summary:  Patient with history of cardiomyopathy and congestive heart failure in the past with the diagnosis of lung cancer which require surgery  Patient has not had any cardiac evaluation in the recent past     Patient will be scheduled for an echocardiogram to reassess ejection fraction given history of cardiomyopathy  EKG done in the office shows sinus rhythm with nonspecific interventricular conduction defect  Patient will also be scheduled for pharmacological nuclear stress test to assess ischemia as a part of preoperative risk assessment  I have instructed the patient to contact the surgeon and postpone the surgery by at least 1 week as all these cardiac evaluation tests cannot be accomplished in time for his surgery on February 18th  I have also discussed this with his pulmonologist Curly Hyatt    Final preoperative risk assessment and recommendations after echocardiogram and pharmacological nuclear stress test   Discussed with patient  He is agreeable with the plan of care

## 2020-02-12 NOTE — PROGRESS NOTES
[facility] [address] [facility] [place] [address]  Cardiology Follow Up    Alda Cruz  1950  [id number]      1  Chest discomfort  POCT ECG   2  Hypertension, essential     3  Coronary artery disease involving native coronary artery of native heart without angina pectoris     4  Cardiomyopathy, unspecified type ([facility] [address] )     5  Chronic diastolic congestive heart failure [facility])         Chief Complaint   Patient presents with    Cardiac Risk Assessment     THORACIC VIDEO-ASSISTED LOBECTOMY/Mediastinal Lymph Node Dissection w/ / Roslyn Snare  Scheduled 2/18/2020       Interval History:  Patient presents for preoperative cardiovascular evaluation for lung surgery  Patient has been diagnosed to have lung cancer  Patient does have history of cardiomyopathy in the past   Patient does have some shortness of breath  Patient denies any history of chest pain presently  No history of leg edema orthopnea PND  Patient has not had any recent cardiac evaluation  Patient's activities pretty limited because of shortness of breath      Patient Active Problem List   Diagnosis    Anxiety associated with depression    Chronic obstructive pulmonary disease (HCC)    Congestive heart failure (HCC)    Coronary artery disease    High cholesterol    Hypertension, essential    Insomnia    Lyme borreliosis    Polyarthropathy    Neuropathy    Myopathy    Inflammatory arthropathy    Health care maintenance    Prostate cancer screening    Colon cancer screening     Past Medical History:   Diagnosis Date    Nonischemic cardiomyopathy ([facility] [address] )     last assessed 6/21/17; resolved 9/27/16     Social History     Socioeconomic History    Marital status: /Civil Union     Spouse name: Not on file    Number of children: Not on file    Years of education: Not on file    Highest education level: Not on file   Occupational History    Occupation: retired   Social Needs    Financial resource strain: Not on file  Food insecurity:     Worry: Not on file     Inability: Not on file    Transportation needs:     Medical: Not on file     Non-medical: Not on file   Tobacco Use    Smoking status: Former Smoker     Packs/day: 1 00     Years: 20 00     Pack years: 20 00     Last attempt to quit: 1998     Years since quittin 2    Smokeless tobacco: Never Used   Substance and Sexual Activity    Alcohol use:  Yes     Alcohol/week: 6 0 standard drinks     Types: 6 Glasses of wine per week     Comment: with dinner    Drug use: No    Sexual activity: Yes     Partners: Male   Lifestyle    Physical activity:     Days per week: 7 days     Minutes per session: 30 min    Stress: Rather much   Relationships    Social connections:     Talks on phone: Not on file     Gets together: Not on file     Attends Episcopalian service: Not on file     Active member of club or organization: Not on file     Attends meetings of clubs or organizations: Not on file     Relationship status: Not on file    Intimate partner violence:     Fear of current or ex partner: Not on file     Emotionally abused: Not on file     Physically abused: Not on file     Forced sexual activity: Not on file   Other Topics Concern    Not on file   Social History Narrative    Disabled        No advance directives      Family History   Adopted: Yes   Problem Relation Age of Onset    No Known Problems Mother     No Known Problems Father      Past Surgical History:   Procedure Laterality Date    CARDIAC CATHETERIZATION      his ablation    LIPOMA RESECTION         Current Outpatient Medications:     albuterol (PROVENTIL HFA,VENTOLIN HFA) 90 mcg/act inhaler, 2 puff(s), Disp: , Rfl:     amLODIPine (NORVASC) 10 mg tablet, Take 1 tablet (10 mg total) by mouth daily, Disp: 90 tablet, Rfl: 3    BREO ELLIPTA 200-25 MCG/INH inhaler, , Disp: , Rfl:     carvedilol (COREG) 12 5 mg tablet, Take 1 tablet (12 5 mg total) by mouth 2 (two) times a day, Disp: 180 tablet, Rfl: 3    citalopram (CeleXA) 20 mg tablet, 1 tab(s), Disp: , Rfl:     donepezil (ARICEPT) 10 mg tablet, Take 1 tablet by mouth daily, Disp: , Rfl:     fexofenadine (ALLEGRA) 180 MG tablet, 1 tab(s), Disp: , Rfl:     fluticasone (FLONASE) 50 mcg/act nasal spray, 2 spray(s), Disp: , Rfl:     furosemide (LASIX) 20 mg tablet, Take 1 tablet by mouth daily, Disp: , Rfl:     hydroxychloroquine (PLAQUENIL) 200 mg tablet, Take 1 tablet (200 mg total) by mouth 2 (two) times a day with meals for 90 days, Disp: 180 tablet, Rfl: 3    isosorbide mononitrate (IMDUR) 30 mg 24 hr tablet, Take 1 tablet by mouth daily, Disp: , Rfl:     lisinopril (ZESTRIL) 20 mg tablet, Take 1 tablet (20 mg total) by mouth daily, Disp: 90 tablet, Rfl: 3    montelukast (SINGULAIR) 10 mg tablet, 1 tab(s), Disp: , Rfl:     nitroglycerin (NITROSTAT) 0 4 mg SL tablet, Place 1 tablet under the tongue, Disp: , Rfl:     potassium chloride (K-DUR) 10 mEq tablet, Take 1 tablet (10 mEq total) by mouth 2 (two) times a day, Disp: 180 tablet, Rfl: 3    rOPINIRole (REQUIP) 0 5 mg tablet, TAKE 1 TABLET BY MOUTH ONCE DAILY AT BEDTIME, Disp: 90 tablet, Rfl: 0    traZODone (DESYREL) 50 mg tablet, Take 50 mg by mouth daily at bedtime, Disp: , Rfl:   Allergies   Allergen Reactions    Pollen Extract Itching and Sneezing       Labs:  No visits with results within 2 Month(s) from this visit  Latest known visit with results is:   Appointment on 06/17/2019   Component Date Value    Cortisol - AM 06/17/2019 14 9     ACTH 06/17/2019 23 1     TSH 3RD GENERATON 06/17/2019 1 590     Free T4 06/17/2019 0 93     Total CK 06/17/2019 106      Imaging: No results found      Review of Systems:  Review of Systems    REVIEW OF SYSTEMS:  Constitutional:  Denies fever or chills   Eyes:  Denies change in visual acuity   HENT:  Denies nasal congestion or sore throat   Respiratory:   shortness of breath   Cardiovascular:  Denies chest pain or edema   GI:  Denies abdominal pain, nausea, vomiting, bloody stools or diarrhea   :  Denies dysuria, frequency, difficulty in micturition and nocturia  Musculoskeletal:  Denies back pain or joint pain   Neurologic:  Denies headache, focal weakness or sensory changes   Endocrine:  Denies polyuria or polydipsia   Lymphatic:  Denies swollen glands   Psychiatric:  Denies depression or anxiety         Physical Exam:    /74   Pulse 79   Ht 5' 5 5" (1 664 m)   Wt 88 kg (194 lb)   SpO2 97%   BMI 31 79 kg/m²     Physical Exam   PHYSICAL EXAM:  General:  Patient is not in acute distress   Head: Normocephalic, Atraumatic  HEENT:  Both pupils normal-size atraumatic, normocephalic, nonicteric  Neck:  JVP not raised  Trachea central  No carotid bruit  Respiratory:  Decreased breath sounds bilaterally  Cardiovascular:  Regular rate and rhythm no S3 no murmurs  GI:  Abdomen soft nontender  No organomegaly  Lymphatic:  No cervical or inguinal lymphadenopathy  Neurologic:  Patient is awake alert, oriented   Grossly nonfocal  Extremities no edema    Discussion/Summary:  Patient with history of cardiomyopathy and congestive heart failure in the past with the diagnosis of lung cancer which require surgery  Patient has not had any cardiac evaluation in the recent past     Patient will be scheduled for an echocardiogram to reassess ejection fraction given history of cardiomyopathy  EKG done in the office shows sinus rhythm with nonspecific interventricular conduction defect  Patient will also be scheduled for pharmacological nuclear stress test to assess ischemia as a part of preoperative risk assessment  I have instructed the patient to contact the surgeon and postpone the surgery by at least 1 week as all these cardiac evaluation tests cannot be accomplished in time for his surgery on February 18th  I have also discussed this with his pulmonologist Fredo Marie    Final preoperative risk assessment and recommendations after echocardiogram and pharmacological nuclear stress test   Discussed with patient  He is agreeable with the plan of care

## 2020-02-13 ENCOUNTER — HOSPITAL ENCOUNTER (OUTPATIENT)
Dept: NON INVASIVE DIAGNOSTICS | Facility: CLINIC | Age: 70
Discharge: HOME | End: 2020-02-13
Payer: MEDICARE

## 2020-02-13 DIAGNOSIS — Z01.810 PRE-OPERATIVE CARDIOVASCULAR EXAMINATION: ICD-10-CM

## 2020-02-13 DIAGNOSIS — I25.10 CORONARY ARTERY DISEASE INVOLVING NATIVE CORONARY ARTERY OF NATIVE HEART WITHOUT ANGINA PECTORIS: ICD-10-CM

## 2020-02-13 DIAGNOSIS — I42.9 CARDIOMYOPATHY, UNSPECIFIED TYPE (HCC): ICD-10-CM

## 2020-02-13 PROCEDURE — A9502 TC99M TETROFOSMIN: HCPCS

## 2020-02-13 PROCEDURE — 93306 TTE W/DOPPLER COMPLETE: CPT

## 2020-02-13 PROCEDURE — 78452 HT MUSCLE IMAGE SPECT MULT: CPT

## 2020-02-13 PROCEDURE — 93016 CV STRESS TEST SUPVJ ONLY: CPT | Performed by: INTERNAL MEDICINE

## 2020-02-13 PROCEDURE — 93306 TTE W/DOPPLER COMPLETE: CPT | Performed by: INTERNAL MEDICINE

## 2020-02-13 PROCEDURE — 78452 HT MUSCLE IMAGE SPECT MULT: CPT | Performed by: INTERNAL MEDICINE

## 2020-02-13 PROCEDURE — 93017 CV STRESS TEST TRACING ONLY: CPT

## 2020-02-13 PROCEDURE — 93018 CV STRESS TEST I&R ONLY: CPT | Performed by: INTERNAL MEDICINE

## 2020-02-13 RX ORDER — AMINOPHYLLINE DIHYDRATE 25 MG/ML
50 INJECTION, SOLUTION INTRAVENOUS ONCE
Status: DISCONTINUED | OUTPATIENT
Start: 2020-02-13 | End: 2020-02-14 | Stop reason: HOSPADM

## 2020-02-13 RX ADMIN — REGADENOSON 0.4 MG: 0.08 INJECTION, SOLUTION INTRAVENOUS at 13:25

## 2020-02-14 LAB
ARRHY DURING EX: NORMAL
CHEST PAIN STATEMENT: NORMAL
MAX DIASTOLIC BP: 84 MMHG
MAX HEART RATE: 96 BPM
MAX PREDICTED HEART RATE: 151 BPM
MAX. SYSTOLIC BP: 166 MMHG
PROTOCOL NAME: NORMAL
REASON FOR TERMINATION: NORMAL
TARGET HR FORMULA: NORMAL
TEST INDICATION: NORMAL
TIME IN EXERCISE PHASE: NORMAL

## 2020-02-19 DIAGNOSIS — I42.9 CARDIOMYOPATHY, UNSPECIFIED TYPE (HCC): Primary | ICD-10-CM

## 2020-02-20 ENCOUNTER — TELEPHONE (OUTPATIENT)
Dept: CARDIOLOGY CLINIC | Facility: CLINIC | Age: 70
End: 2020-02-20

## 2020-02-20 NOTE — TELEPHONE ENCOUNTER
----- Message from Esau White MD sent at 2/19/2020  3:26 PM EST -----  Regarding: Cardiac catheterization  Please schedule this patient for cardiac catheterization at [facility] in the next week or less  Patient needs lung surgery and has to have cardiac catheterization before that  I talked to the wife about the same  I will put in the orders in the chart  Thank you

## 2020-02-20 NOTE — TELEPHONE ENCOUNTER
S/w wife, pt will have labs drawn at a [facility]'s lab tomorrow  Once results are received, his paperwork will be faxed to the hospital to be put on the schedule

## 2020-02-21 ENCOUNTER — APPOINTMENT (OUTPATIENT)
Dept: LAB | Facility: CLINIC | Age: 70
End: 2020-02-21
Payer: MEDICARE

## 2020-02-21 LAB
ANION GAP SERPL CALCULATED.3IONS-SCNC: 4 MMOL/L (ref 4–13)
BACTERIA UR QL AUTO: NORMAL /HPF
BASOPHILS # BLD AUTO: 0.03 THOUSANDS/ΜL (ref 0–0.1)
BASOPHILS NFR BLD AUTO: 0 % (ref 0–1)
BILIRUB UR QL STRIP: NEGATIVE
BUN SERPL-MCNC: 30 MG/DL (ref 5–25)
CALCIUM SERPL-MCNC: 9.4 MG/DL (ref 8.3–10.1)
CHLORIDE SERPL-SCNC: 105 MMOL/L (ref 100–108)
CLARITY UR: CLEAR
CO2 SERPL-SCNC: 28 MMOL/L (ref 21–32)
COLOR UR: YELLOW
CREAT SERPL-MCNC: 1.38 MG/DL (ref 0.6–1.3)
EOSINOPHIL # BLD AUTO: 0.31 THOUSAND/ΜL (ref 0–0.61)
EOSINOPHIL NFR BLD AUTO: 4 % (ref 0–6)
ERYTHROCYTE [DISTWIDTH] IN BLOOD BY AUTOMATED COUNT: 12.1 % (ref 11.6–15.1)
GFR SERPL CREATININE-BSD FRML MDRD: 52 ML/MIN/1.73SQ M
GLUCOSE P FAST SERPL-MCNC: 96 MG/DL (ref 65–99)
GLUCOSE UR STRIP-MCNC: NEGATIVE MG/DL
HCT VFR BLD AUTO: 47.4 % (ref 36.5–49.3)
HGB BLD-MCNC: 15.9 G/DL (ref 12–17)
HGB UR QL STRIP.AUTO: NEGATIVE
HYALINE CASTS #/AREA URNS LPF: NORMAL /LPF
IMM GRANULOCYTES # BLD AUTO: 0.02 THOUSAND/UL (ref 0–0.2)
IMM GRANULOCYTES NFR BLD AUTO: 0 % (ref 0–2)
INR PPP: 1 (ref 0.84–1.19)
KETONES UR STRIP-MCNC: NEGATIVE MG/DL
LEUKOCYTE ESTERASE UR QL STRIP: NEGATIVE
LYMPHOCYTES # BLD AUTO: 2.12 THOUSANDS/ΜL (ref 0.6–4.47)
LYMPHOCYTES NFR BLD AUTO: 26 % (ref 14–44)
MCH RBC QN AUTO: 29.9 PG (ref 26.8–34.3)
MCHC RBC AUTO-ENTMCNC: 33.5 G/DL (ref 31.4–37.4)
MCV RBC AUTO: 89 FL (ref 82–98)
MONOCYTES # BLD AUTO: 0.79 THOUSAND/ΜL (ref 0.17–1.22)
MONOCYTES NFR BLD AUTO: 10 % (ref 4–12)
NEUTROPHILS # BLD AUTO: 4.93 THOUSANDS/ΜL (ref 1.85–7.62)
NEUTS SEG NFR BLD AUTO: 60 % (ref 43–75)
NITRITE UR QL STRIP: NEGATIVE
NON-SQ EPI CELLS URNS QL MICRO: NORMAL /HPF
NRBC BLD AUTO-RTO: 0 /100 WBCS
PH UR STRIP.AUTO: 6 [PH]
PLATELET # BLD AUTO: 275 THOUSANDS/UL (ref 149–390)
PMV BLD AUTO: 10.4 FL (ref 8.9–12.7)
POTASSIUM SERPL-SCNC: 3.9 MMOL/L (ref 3.5–5.3)
PROT UR STRIP-MCNC: ABNORMAL MG/DL
PROTHROMBIN TIME: 12.8 SECONDS (ref 11.6–14.5)
RBC # BLD AUTO: 5.32 MILLION/UL (ref 3.88–5.62)
RBC #/AREA URNS AUTO: NORMAL /HPF
SODIUM SERPL-SCNC: 137 MMOL/L (ref 136–145)
SP GR UR STRIP.AUTO: 1.02 (ref 1–1.03)
UROBILINOGEN UR QL STRIP.AUTO: 0.2 E.U./DL
WBC # BLD AUTO: 8.2 THOUSAND/UL (ref 4.31–10.16)
WBC #/AREA URNS AUTO: NORMAL /HPF

## 2020-02-21 PROCEDURE — 36415 COLL VENOUS BLD VENIPUNCTURE: CPT

## 2020-02-21 PROCEDURE — 85610 PROTHROMBIN TIME: CPT

## 2020-02-21 PROCEDURE — 80048 BASIC METABOLIC PNL TOTAL CA: CPT

## 2020-02-21 PROCEDURE — 85025 COMPLETE CBC W/AUTO DIFF WBC: CPT

## 2020-02-21 PROCEDURE — 81001 URINALYSIS AUTO W/SCOPE: CPT | Performed by: INTERNAL MEDICINE

## 2020-02-24 NOTE — TELEPHONE ENCOUNTER
Labs are in Epic, paperwork faxed to the hospital to be put on the schedule   Hydration form faxed to Dr CHILDERS, to be filled out and signed, will then be faxed to the hospital

## 2020-03-02 ENCOUNTER — TELEPHONE (OUTPATIENT)
Dept: INTERVENTIONAL RADIOLOGY/VASCULAR | Facility: HOSPITAL | Age: 70
End: 2020-03-02

## 2020-03-02 RX ORDER — SODIUM CHLORIDE 9 MG/ML
125 INJECTION, SOLUTION INTRAVENOUS CONTINUOUS
Status: CANCELLED | OUTPATIENT
Start: 2020-03-02

## 2020-03-04 ENCOUNTER — HOSPITAL ENCOUNTER (OUTPATIENT)
Dept: INTERVENTIONAL RADIOLOGY/VASCULAR | Facility: HOSPITAL | Age: 70
Discharge: HOME | End: 2020-03-04
Attending: INTERNAL MEDICINE
Payer: MEDICARE

## 2020-03-04 VITALS
SYSTOLIC BLOOD PRESSURE: 146 MMHG | RESPIRATION RATE: 18 BRPM | HEIGHT: 66 IN | HEART RATE: 79 BPM | OXYGEN SATURATION: 96 % | TEMPERATURE: 98.5 F | DIASTOLIC BLOOD PRESSURE: 75 MMHG | BODY MASS INDEX: 31.57 KG/M2 | WEIGHT: 196.43 LBS

## 2020-03-04 DIAGNOSIS — I42.9 CARDIOMYOPATHY, UNSPECIFIED TYPE (HCC): ICD-10-CM

## 2020-03-04 LAB
ANION GAP SERPL CALCULATED.3IONS-SCNC: 10 MMOL/L (ref 4–13)
BUN SERPL-MCNC: 22 MG/DL (ref 5–25)
CALCIUM SERPL-MCNC: 8.4 MG/DL (ref 8.3–10.1)
CHLORIDE SERPL-SCNC: 106 MMOL/L (ref 100–108)
CO2 SERPL-SCNC: 23 MMOL/L (ref 21–32)
CREAT SERPL-MCNC: 1.22 MG/DL (ref 0.6–1.3)
GFR SERPL CREATININE-BSD FRML MDRD: 60 ML/MIN/1.73SQ M
GLUCOSE P FAST SERPL-MCNC: 118 MG/DL (ref 65–99)
GLUCOSE SERPL-MCNC: 118 MG/DL (ref 65–140)
POTASSIUM SERPL-SCNC: 4 MMOL/L (ref 3.5–5.3)
SODIUM SERPL-SCNC: 139 MMOL/L (ref 136–145)

## 2020-03-04 PROCEDURE — 80048 BASIC METABOLIC PNL TOTAL CA: CPT | Performed by: INTERNAL MEDICINE

## 2020-03-04 PROCEDURE — C1894 INTRO/SHEATH, NON-LASER: HCPCS | Performed by: INTERNAL MEDICINE

## 2020-03-04 PROCEDURE — 99152 MOD SED SAME PHYS/QHP 5/>YRS: CPT | Performed by: INTERNAL MEDICINE

## 2020-03-04 PROCEDURE — 93458 L HRT ARTERY/VENTRICLE ANGIO: CPT | Performed by: INTERNAL MEDICINE

## 2020-03-04 PROCEDURE — 99153 MOD SED SAME PHYS/QHP EA: CPT | Performed by: INTERNAL MEDICINE

## 2020-03-04 PROCEDURE — C1769 GUIDE WIRE: HCPCS | Performed by: INTERNAL MEDICINE

## 2020-03-04 RX ORDER — VERAPAMIL HCL 2.5 MG/ML
AMPUL (ML) INTRAVENOUS CODE/TRAUMA/SEDATION MEDICATION
Status: COMPLETED | OUTPATIENT
Start: 2020-03-04 | End: 2020-03-04

## 2020-03-04 RX ORDER — NITROGLYCERIN 20 MG/100ML
INJECTION INTRAVENOUS CODE/TRAUMA/SEDATION MEDICATION
Status: COMPLETED | OUTPATIENT
Start: 2020-03-04 | End: 2020-03-04

## 2020-03-04 RX ORDER — HEPARIN SODIUM 1000 [USP'U]/ML
INJECTION, SOLUTION INTRAVENOUS; SUBCUTANEOUS CODE/TRAUMA/SEDATION MEDICATION
Status: COMPLETED | OUTPATIENT
Start: 2020-03-04 | End: 2020-03-04

## 2020-03-04 RX ORDER — FENTANYL CITRATE 50 UG/ML
INJECTION, SOLUTION INTRAMUSCULAR; INTRAVENOUS CODE/TRAUMA/SEDATION MEDICATION
Status: COMPLETED | OUTPATIENT
Start: 2020-03-04 | End: 2020-03-04

## 2020-03-04 RX ORDER — LIDOCAINE WITH 8.4% SOD BICARB 0.9%(10ML)
SYRINGE (ML) INJECTION CODE/TRAUMA/SEDATION MEDICATION
Status: COMPLETED | OUTPATIENT
Start: 2020-03-04 | End: 2020-03-04

## 2020-03-04 RX ORDER — MIDAZOLAM HYDROCHLORIDE 2 MG/2ML
INJECTION, SOLUTION INTRAMUSCULAR; INTRAVENOUS CODE/TRAUMA/SEDATION MEDICATION
Status: COMPLETED | OUTPATIENT
Start: 2020-03-04 | End: 2020-03-04

## 2020-03-04 RX ORDER — SODIUM CHLORIDE 9 MG/ML
125 INJECTION, SOLUTION INTRAVENOUS CONTINUOUS
Status: DISCONTINUED | OUTPATIENT
Start: 2020-03-04 | End: 2020-03-08 | Stop reason: HOSPADM

## 2020-03-04 RX ORDER — SODIUM CHLORIDE 9 MG/ML
125 INJECTION, SOLUTION INTRAVENOUS CONTINUOUS
Status: DISPENSED | OUTPATIENT
Start: 2020-03-04 | End: 2020-03-04

## 2020-03-04 RX ADMIN — Medication 2 ML: at 09:22

## 2020-03-04 RX ADMIN — FENTANYL CITRATE 50 MCG: 50 INJECTION, SOLUTION INTRAMUSCULAR; INTRAVENOUS at 09:11

## 2020-03-04 RX ADMIN — VERAPAMIL HYDROCHLORIDE 2.5 MG: 2.5 INJECTION, SOLUTION INTRAVENOUS at 09:24

## 2020-03-04 RX ADMIN — MIDAZOLAM HYDROCHLORIDE 1 MG: 1 INJECTION, SOLUTION INTRAMUSCULAR; INTRAVENOUS at 09:11

## 2020-03-04 RX ADMIN — HEPARIN SODIUM 5000 UNITS: 1000 INJECTION INTRAVENOUS; SUBCUTANEOUS at 09:28

## 2020-03-04 RX ADMIN — NITROGLYCERIN 200 MCG: 20 INJECTION INTRAVENOUS at 09:24

## 2020-03-04 RX ADMIN — IODIXANOL 40 ML: 320 INJECTION, SOLUTION INTRAVASCULAR at 09:34

## 2020-03-04 NOTE — DISCHARGE INSTRUCTIONS
After Radial Heart Catheterization     WHAT YOU NEED TO KNOW:     What will happen after a radial heart catheterization? · You will be attached to a heart monitor until you are fully awake  A heart monitor is an EKG that stays on continuously to record your heart's electrical activity  Healthcare providers will monitor your vital signs and pulses in your arm  They will frequently check your pressure bandage for bleeding or swelling  · You may have a band wrapped tightly around your wrist  The band puts pressure on your wound and helps prevent bleeding  A healthcare provider can put air into the band or remove air from the band  A healthcare provider will gradually remove air from the band and decrease pressure on your wrist  The band may be removed in 2 hours or when your wound stops bleeding  · You will need to keep your wrist straight for 2 to 4 hours  Do not  push or pull with your arm  Arm movements can cause serious bleeding  After you are monitored for several hours, you may go home or may need to stay in the hospital overnight  What do I need to know before I go home? · Care for your wound as directed  Remove the initial bandage in 24 hours or as directed  Mild bruising is normal and expected  The provided [employer] bandage can be placed on your wound after you remove the initial bandage  Do not put powders, lotions, or creams on your wound  They may cause your wound to get infected  Monitor your wound every day for signs of infection, such as redness, swelling, or pus  · Shower the day after your procedure  Remove the initial bandage before you shower  Do not take baths or go in hot tubs or pools  Carefully wash the wound with soap and water  Pat the area dry  The provided [employer] bandage can be placed on your wound after you shower  You will replace this bandage after each shower for 4 days  · Apply firm, steady pressure to your wound if it bleeds    Apply pressure with a clean gauze or towel for 5 to 10 minutes  Call 911 if bleeding becomes heavy or does not stop  · Drink liquids as directed  Liquids will help flush the contrast liquid from your body  Ask how much liquid to drink each day and which liquids are best for you  · Do not lift anything heavier than a gallon of milk for 1 week  Heavy lifting can put stress on your wound and cause bleeding  Do not push or pull with the arm that was used for the procedure  Do not do vigorous activity for at least 48 hours  Vigorous activity may cause bleeding from your wound  Rest and do quiet activities  Take short walks around the house to prevent a blood clot  You may return to your normal activities after 1 week  · Do not drive for 48 hours  Your healthcare provider may tell you to wait 48 hours before you drive to decrease your risk for bleeding  You may not be able to return to work for at least 2 days after your procedure if your job involves heavy lifting  What medicines may I need? You may need any of the following:    · Acetaminophen  helps decrease pain and fever  This medicine is available without a doctor's order  Ask how much medicine is safe to take, and how often to take it  Acetaminophen can cause liver damage if not taken correctly  · Take your medicine as directed  Contact your healthcare provider if you think your medicine is not helping or if you have side effects  Tell him or her if you are allergic to any medicine  Keep a list of the medicines, vitamins, and herbs you take  Include the amounts, and when and why you take them  Bring the list or the pill bottles to follow-up visits  Carry your medicine list with you in case of an emergency      Call 911 for any of the following:   · You have any of the following signs of a heart attack:      ¨ Squeezing, pressure, or pain in your chest that lasts longer than 5 minutes or returns    ¨ Discomfort or pain in your back, neck, jaw, stomach, or arm     ¨ Trouble breathing    ¨ Nausea or vomiting    ¨ Lightheadedness or a sudden cold sweat, especially with chest pain or trouble breathing    · You have any of the following signs of a stroke:      ¨ Numbness or drooping on one side of your face     ¨ Weakness in an arm or leg    ¨ Confusion or difficulty speaking    ¨ Dizziness, a severe headache, or vision loss    · You feel lightheaded, short of breath, and have chest pain  · You cough up blood  · You have trouble breathing  · You cannot stop the bleeding from your wound even after you hold firm pressure for 10 minutes  When should I seek immediate care? · Blood soaks through your bandage  · Your hand or arm feels numb, cool, or looks pale  · Your wound gets swollen quickly  When should I contact my healthcare provider? · You have a fever or chills  · Your wound is red, swollen, or draining pus  · Your wound looks more bruised or you have new bruising on the side of your wrist      · You have nausea or are vomiting  · Your skin is itchy, swollen, or you have a rash  · You have questions or concerns about your condition or care  CARE AGREEMENT:   You have the right to help plan your care  Learn about your health condition and how it may be treated  Discuss treatment options with your caregivers to decide what care you want to receive  You always have the right to refuse treatment  The above information is an  only  It is not intended as medical advice for individual conditions or treatments  Talk to your doctor, nurse or pharmacist before following any medical regimen to see if it is safe and effective for you  © 2017 [employer]  Information is for End User's use only and may not be sold, redistributed or otherwise used for commercial purposes  All illustrations and images included in CareNotes® are the copyrighted property of A D A M [employer] Inc  or Agusto Pollard

## 2020-03-04 NOTE — PROGRESS NOTES
Patient transported to [address] on monitor, report given to [employer], care assumed  Assessed R radial puncture site with RN, site is clean, dry and intact with no signs or symptoms of bleeding or hematoma  Cap refill is brisk  Patient denies any chest pain at this time  VSS

## 2020-03-04 NOTE — INTERVAL H&P NOTE
Update: (This section must be completed if the H&P was completed greater than 24 hrs to procedure or admission)    H&P reviewed  After examining the patient, I find no changed to the H&P since it had been written  /93   Pulse 84   Temp 98 5 °F (36 9 °C) (Oral)   Resp 18   Ht 5' 6" (1 676 m)   Wt 89 1 kg (196 lb 6 9 oz)   SpO2 97%   BMI 31 70 kg/m²     70 y/o male with lung CA, referred for coronary angiography after abnormal preoperative stress test     Patient re-evaluated   Accept as history and physical     Keyanna Montes, DO/March 4, 2020/8:10 AM

## 2020-03-25 ENCOUNTER — TELEPHONE (OUTPATIENT)
Dept: CARDIOLOGY CLINIC | Facility: CLINIC | Age: 70
End: 2020-03-25

## 2020-03-30 ENCOUNTER — TELEMEDICINE (OUTPATIENT)
Dept: CARDIOLOGY CLINIC | Facility: CLINIC | Age: 70
End: 2020-03-30
Payer: MEDICARE

## 2020-03-30 VITALS — HEIGHT: 66 IN | BODY MASS INDEX: 30.86 KG/M2 | WEIGHT: 192 LBS

## 2020-03-30 DIAGNOSIS — I48.0 PAF (PAROXYSMAL ATRIAL FIBRILLATION) (HCC): Primary | ICD-10-CM

## 2020-03-30 DIAGNOSIS — I10 HYPERTENSION, UNSPECIFIED TYPE: ICD-10-CM

## 2020-03-30 PROCEDURE — 99214 OFFICE O/P EST MOD 30 MIN: CPT | Performed by: INTERNAL MEDICINE

## 2020-03-30 RX ORDER — GUAIFENESIN 600 MG
1200 TABLET, EXTENDED RELEASE 12 HR ORAL EVERY 12 HOURS SCHEDULED
COMMUNITY
End: 2020-05-06

## 2020-03-30 RX ORDER — CYCLOBENZAPRINE HCL 5 MG
5 TABLET ORAL 3 TIMES DAILY PRN
COMMUNITY
Start: 2020-03-27 | End: 2021-04-05 | Stop reason: SDUPTHER

## 2020-03-30 RX ORDER — AMIODARONE HYDROCHLORIDE 200 MG/1
200 TABLET ORAL 2 TIMES DAILY
COMMUNITY
Start: 2020-03-25 | End: 2020-03-30 | Stop reason: SDUPTHER

## 2020-03-30 RX ORDER — PRAVASTATIN SODIUM 40 MG
TABLET ORAL
COMMUNITY
End: 2020-03-30

## 2020-03-30 RX ORDER — METOPROLOL TARTRATE 50 MG/1
50 TABLET, FILM COATED ORAL 2 TIMES DAILY
COMMUNITY
Start: 2020-03-25 | End: 2020-06-03 | Stop reason: SDUPTHER

## 2020-03-30 RX ORDER — ACETAMINOPHEN 500 MG
500 TABLET ORAL EVERY 4 HOURS PRN
COMMUNITY
Start: 2020-03-25 | End: 2020-04-04

## 2020-03-30 RX ORDER — SENNA PLUS 8.6 MG/1
1 TABLET ORAL DAILY
COMMUNITY
End: 2021-07-14 | Stop reason: ALTCHOICE

## 2020-03-30 RX ORDER — AMIODARONE HYDROCHLORIDE 200 MG/1
200 TABLET ORAL DAILY
Qty: 30 TABLET | Refills: 11
Start: 2020-03-30 | End: 2020-05-15 | Stop reason: SDUPTHER

## 2020-03-30 RX ORDER — FLUTICASONE FUROATE, UMECLIDINIUM BROMIDE AND VILANTEROL TRIFENATATE 100; 62.5; 25 UG/1; UG/1; UG/1
1 POWDER RESPIRATORY (INHALATION) DAILY
COMMUNITY
Start: 2020-03-16 | End: 2021-04-05 | Stop reason: SDUPTHER

## 2020-03-30 RX ORDER — METAXALONE 800 MG/1
800 TABLET ORAL 3 TIMES DAILY PRN
COMMUNITY
Start: 2020-03-27 | End: 2020-03-30

## 2020-03-30 RX ORDER — LISINOPRIL 10 MG/1
10 TABLET ORAL DAILY
Start: 2020-03-30 | End: 2020-04-24 | Stop reason: SDUPTHER

## 2020-03-30 RX ORDER — CITALOPRAM 20 MG/1
20 TABLET ORAL
COMMUNITY
Start: 2017-06-12 | End: 2020-03-30

## 2020-03-31 NOTE — PROGRESS NOTES
Virtual Regular Visit    Problem List Items Addressed This Visit     None      Visit Diagnoses     PAF (paroxysmal atrial fibrillation) (HCC)    -  Primary    Relevant Medications    metoprolol tartrate (LOPRESSOR) 50 mg tablet    amiodarone 200 mg tablet    Hypertension, unspecified type        Relevant Medications    metoprolol tartrate (LOPRESSOR) 50 mg tablet    lisinopril (ZESTRIL) 10 mg tablet               Reason for visit is for nonischemic cardiomyopathy and chronic systolic heart failure status post recent lung surgery for malignancy  Encounter provider Estephanie Landin MD    Provider located at [place] [address]      Recent Visits  Date Type Provider Dept   03/25/20 Telephone Nataliya Ramírez, [address] [place] [address] recent visits within past 7 days and meeting all other requirements     Today's Visits  Date Type Provider Dept   03/30/20 Telemedicine Estephanie Landin MD [facility] today's visits and meeting all other requirements     Future Appointments  No visits were found meeting these conditions  Showing future appointments within next 150 days and meeting all other requirements        The patient was identified by name and date of birth  Steven Barber was informed that this is a telemedicine visit and that the visit is being conducted through [employer]  My office door was closed  No one else was in the room  He acknowledged consent and understanding of privacy and security of the video platform  The patient has agreed to participate and understands they can discontinue the visit at any time  Patient is aware this is a billable service  Subjective  Steven Barber is a 79 y o  male  who has history of nonischemic cardiomyopathy and chronic systolic heart failure underwent lung surgery for malignancy at [facility]    Patient had postoperative atrial fibrillation and was started on amiodarone as well as Eliquis  Patient is also joined by his family on video visit  Patient is doing much better  He does have pain from recent surgery  He is doing incentive spirometry on a regular basis  Detailed medication list was reviewed with patient's family  No history of leg edema orthopnea PND  Gavin Tejada       Past Medical History:   Diagnosis Date    Asthma     [facility]     Lung Cancer    CHF (congestive heart failure) (HCC)     COPD [facility]ch[facility] obstructive [facility] disease) ([facility] )     Hyperlipidemia     Hypertension     Nonischemic cardiomyopathy ([facility] )     last assessed 6/21/17; resolved 9/27/16       Past Surgical History:   Procedure Laterality Date    CARDIAC CATHETERIZATION      his ablation    LIPOMA RESECTION      ROTATOR CUFF REPAIR         Current Outpatient Medications   Medication Sig Dispense Refill    acetaminophen (TYLENOL) 500 mg tablet Take 500 mg by mouth every 4 (four) hours as needed      albuterol (PROVENTIL HFA,VENTOLIN HFA) 90 mcg/act inhaler 2 puff(s)      amiodarone 200 mg tablet Take 1 tablet (200 mg total) by mouth daily 30 tablet 11    apixaban (ELIQUIS) 5 mg Take 5 mg by mouth 2 (two) times a day      cyclobenzaprine (FLEXERIL) 5 mg tablet Take 5 mg by mouth 3 (three) times a day as needed      fluticasone (FLONASE) 50 mcg/act nasal spray 2 spray(s)      furosemide (LASIX) 20 mg tablet Take 1 tablet by mouth daily      guaiFENesin (MUCINEX) 600 mg 12 hr tablet Take 1,200 mg by mouth every 12 (twelve) hours      lisinopril (ZESTRIL) 10 mg tablet Take 1 tablet (10 mg total) by mouth daily      metoprolol tartrate (LOPRESSOR) 50 mg tablet Take 50 mg by mouth 2 (two) times a day      montelukast (SINGULAIR) 10 mg tablet 1 tab(s)      nitroglycerin (NITROSTAT) 0 4 mg SL tablet Place 1 tablet under the tongue      rOPINIRole (REQUIP) 0 5 mg tablet TAKE 1 TABLET BY MOUTH ONCE DAILY AT BEDTIME 90 tablet 0    senna (SENOKOT) 8 6 MG tablet Take 1 tablet by mouth daily      TRELEGY ELLIPTA 100-62 5-25 MCG/INH inhaler Inhale 1 puff daily       No current facility-administered medications for this visit  Allergies   Allergen Reactions    Pollen Extract Itching and Sneezing       Review of Systems   REVIEW OF SYSTEMS:  Constitutional:  Denies fever or chills   Eyes:  Denies change in visual acuity   HENT:  Denies nasal congestion or sore throat   Respiratory:   shortness of breath   Cardiovascular:  Denies chest pain or edema   GI:  Denies abdominal pain, nausea, vomiting, bloody stools or diarrhea   :  Denies dysuria, frequency, difficulty in micturition and nocturia  Musculoskeletal:  Denies back pain or joint pain   Neurologic:  Denies headache, focal weakness or sensory changes   Endocrine:  Denies polyuria or polydipsia   Lymphatic:  Denies swollen glands   Psychiatric:  Denies depression or anxiety       Physical Exam   Patient is not in distress  No leg edema  Patient in good spirits  Weight 192 lb  Assessment and plan:  Patient with multiple medical problems who seems to be doing reasonably well from cardiac standpoint  Previous studies reviewed with patient  Medications reviewed and possible side effects discussed  concepts of cardiovascular disease , signs and symptoms of heart disease  Dietary and risk factor modification reinforced  All questions answered  Safety measures reviewed  Patient advised to report any problems prompting medical attention  Events of recent surgery and post hospitalization atrial fibrillation reviewed with patient and family  Patient understands the risks and benefits of anticoagulation  Will decrease the dosage of amiodarone to once a day  Patient will have a follow-up appointment in 1 month  Importance of salt restriction reinforced  Patient and family had a few questions which were answered  Spent a total of 20 minutes during this tele visit    This included chart preparation and review of cardiovascular studies as well as lab/imaging studies when applicable

## 2020-04-10 DIAGNOSIS — I10 HYPERTENSION, UNSPECIFIED TYPE: ICD-10-CM

## 2020-04-10 RX ORDER — CARVEDILOL 12.5 MG/1
TABLET ORAL
Qty: 180 TABLET | Refills: 0 | OUTPATIENT
Start: 2020-04-10

## 2020-04-24 ENCOUNTER — TELEPHONE (OUTPATIENT)
Dept: CARDIOLOGY CLINIC | Facility: CLINIC | Age: 70
End: 2020-04-24

## 2020-04-24 DIAGNOSIS — G25.81 RESTLESS LEG: ICD-10-CM

## 2020-04-24 DIAGNOSIS — I10 HYPERTENSION, UNSPECIFIED TYPE: ICD-10-CM

## 2020-04-24 DIAGNOSIS — I48.0 PAF (PAROXYSMAL ATRIAL FIBRILLATION) (HCC): Primary | ICD-10-CM

## 2020-04-24 RX ORDER — ROPINIROLE 0.5 MG/1
0.5 TABLET, FILM COATED ORAL
Qty: 90 TABLET | Refills: 3 | Status: SHIPPED | OUTPATIENT
Start: 2020-04-24 | End: 2021-04-05 | Stop reason: SDUPTHER

## 2020-04-24 RX ORDER — LISINOPRIL 10 MG/1
10 TABLET ORAL DAILY
Qty: 90 TABLET | Refills: 3 | Status: SHIPPED | OUTPATIENT
Start: 2020-04-24 | End: 2021-04-05 | Stop reason: SDUPTHER

## 2020-05-06 ENCOUNTER — TELEMEDICINE (OUTPATIENT)
Dept: CARDIOLOGY CLINIC | Facility: CLINIC | Age: 70
End: 2020-05-06
Payer: MEDICARE

## 2020-05-06 VITALS — BODY MASS INDEX: 29.89 KG/M2 | HEIGHT: 66 IN | WEIGHT: 186 LBS

## 2020-05-06 DIAGNOSIS — Z79.01 CHRONIC ANTICOAGULATION: ICD-10-CM

## 2020-05-06 DIAGNOSIS — I50.42 CHRONIC COMBINED SYSTOLIC AND DIASTOLIC CONGESTIVE HEART FAILURE (HCC): ICD-10-CM

## 2020-05-06 DIAGNOSIS — I48.0 PAF (PAROXYSMAL ATRIAL FIBRILLATION) (HCC): Primary | ICD-10-CM

## 2020-05-06 PROCEDURE — 99214 OFFICE O/P EST MOD 30 MIN: CPT | Performed by: INTERNAL MEDICINE

## 2020-05-15 DIAGNOSIS — I48.0 PAF (PAROXYSMAL ATRIAL FIBRILLATION) (HCC): ICD-10-CM

## 2020-05-15 RX ORDER — AMIODARONE HYDROCHLORIDE 200 MG/1
200 TABLET ORAL DAILY
Qty: 90 TABLET | Refills: 3 | Status: SHIPPED | OUTPATIENT
Start: 2020-05-15 | End: 2021-04-05 | Stop reason: SDUPTHER

## 2020-05-27 RX ORDER — LEVOTHYROXINE SODIUM 0.05 MG/1
50 TABLET ORAL DAILY
COMMUNITY
Start: 2020-05-20 | End: 2020-06-19 | Stop reason: SDUPTHER

## 2020-05-28 ENCOUNTER — TELEMEDICINE (OUTPATIENT)
Dept: INTERNAL MEDICINE CLINIC | Facility: CLINIC | Age: 70
End: 2020-05-28
Payer: MEDICARE

## 2020-05-28 DIAGNOSIS — J41.0 SIMPLE CHRONIC BRONCHITIS (HCC): Primary | ICD-10-CM

## 2020-05-28 PROCEDURE — 99214 OFFICE O/P EST MOD 30 MIN: CPT | Performed by: INTERNAL MEDICINE

## 2020-05-29 ENCOUNTER — OFFICE VISIT (OUTPATIENT)
Dept: INTERNAL MEDICINE CLINIC | Facility: CLINIC | Age: 70
End: 2020-05-29
Payer: MEDICARE

## 2020-05-29 VITALS
SYSTOLIC BLOOD PRESSURE: 152 MMHG | HEART RATE: 75 BPM | WEIGHT: 193.6 LBS | HEIGHT: 66 IN | DIASTOLIC BLOOD PRESSURE: 90 MMHG | BODY MASS INDEX: 31.12 KG/M2 | OXYGEN SATURATION: 97 % | TEMPERATURE: 98.4 F

## 2020-05-29 DIAGNOSIS — E03.9 HYPOTHYROIDISM, UNSPECIFIED TYPE: ICD-10-CM

## 2020-05-29 DIAGNOSIS — I10 HYPERTENSION, ESSENTIAL: ICD-10-CM

## 2020-05-29 DIAGNOSIS — I48.91 ATRIAL FIBRILLATION, UNSPECIFIED TYPE (HCC): ICD-10-CM

## 2020-05-29 DIAGNOSIS — J41.0 SIMPLE CHRONIC BRONCHITIS (HCC): Primary | ICD-10-CM

## 2020-05-29 DIAGNOSIS — A69.23 LYME ARTHRITIS (HCC): ICD-10-CM

## 2020-05-29 DIAGNOSIS — M19.90 INFLAMMATORY ARTHROPATHY: ICD-10-CM

## 2020-05-29 DIAGNOSIS — M13.0 POLYARTHROPATHY: ICD-10-CM

## 2020-05-29 DIAGNOSIS — G72.9 MYOPATHY: ICD-10-CM

## 2020-05-29 DIAGNOSIS — I10 ESSENTIAL HYPERTENSION: ICD-10-CM

## 2020-05-29 PROCEDURE — 1160F RVW MEDS BY RX/DR IN RCRD: CPT | Performed by: PHYSICIAN ASSISTANT

## 2020-05-29 PROCEDURE — 3008F BODY MASS INDEX DOCD: CPT | Performed by: PHYSICIAN ASSISTANT

## 2020-05-29 PROCEDURE — 3077F SYST BP >= 140 MM HG: CPT | Performed by: PHYSICIAN ASSISTANT

## 2020-05-29 PROCEDURE — 99214 OFFICE O/P EST MOD 30 MIN: CPT | Performed by: PHYSICIAN ASSISTANT

## 2020-05-29 PROCEDURE — 3080F DIAST BP >= 90 MM HG: CPT | Performed by: PHYSICIAN ASSISTANT

## 2020-05-29 PROCEDURE — 4040F PNEUMOC VAC/ADMIN/RCVD: CPT | Performed by: PHYSICIAN ASSISTANT

## 2020-05-29 PROCEDURE — 1036F TOBACCO NON-USER: CPT | Performed by: PHYSICIAN ASSISTANT

## 2020-05-29 NOTE — PATIENT INSTRUCTIONS
Medication Management Service  [address] [phone]    Walter Boyce, [facility] on 5/29/20 at 12:11 PM EDT    CLINICAL PHARMACY CONSULT: MED Why disease is still a possibility  I actually want to take 2 antibiotics for 3 more weeks but you need to repeat some laboratory testing to look for other causes of joint inflammation  You have already stop the pravastatin  Get the blood testing done Monday, begin doxycycline plus amoxicillin today, revisit here next Thursday or Friday

## 2020-06-03 DIAGNOSIS — I50.42 CHRONIC COMBINED SYSTOLIC AND DIASTOLIC CONGESTIVE HEART FAILURE (HCC): Primary | ICD-10-CM

## 2020-06-03 RX ORDER — METOPROLOL TARTRATE 50 MG/1
50 TABLET, FILM COATED ORAL 2 TIMES DAILY
Qty: 180 TABLET | Refills: 3 | Status: SHIPPED | OUTPATIENT
Start: 2020-06-03 | End: 2021-04-05 | Stop reason: SDUPTHER

## 2020-06-15 ENCOUNTER — APPOINTMENT (OUTPATIENT)
Dept: LAB | Facility: CLINIC | Age: 70
End: 2020-06-15
Payer: MEDICARE

## 2020-06-15 DIAGNOSIS — M13.0 POLYARTHROPATHY: ICD-10-CM

## 2020-06-15 DIAGNOSIS — G72.9 MYOPATHY: ICD-10-CM

## 2020-06-15 DIAGNOSIS — A69.23 LYME ARTHRITIS (HCC): ICD-10-CM

## 2020-06-15 DIAGNOSIS — M19.90 INFLAMMATORY ARTHROPATHY: ICD-10-CM

## 2020-06-15 LAB
CK SERPL-CCNC: 109 U/L (ref 39–308)
CRP SERPL QL: 17.3 MG/L

## 2020-06-15 PROCEDURE — 86618 LYME DISEASE ANTIBODY: CPT

## 2020-06-15 PROCEDURE — 86617 LYME DISEASE ANTIBODY: CPT

## 2020-06-15 PROCEDURE — 86140 C-REACTIVE PROTEIN: CPT

## 2020-06-15 PROCEDURE — 36415 COLL VENOUS BLD VENIPUNCTURE: CPT

## 2020-06-15 PROCEDURE — 82550 ASSAY OF CK (CPK): CPT

## 2020-06-15 PROCEDURE — 82085 ASSAY OF ALDOLASE: CPT

## 2020-06-15 PROCEDURE — U0003 INFECTIOUS AGENT DETECTION BY NUCLEIC ACID (DNA OR RNA); SEVERE ACUTE RESPIRATORY SYNDROME CORONAVIRUS 2 (SARS-COV-2) (CORONAVIRUS DISEASE [COVID-19]), AMPLIFIED PROBE TECHNIQUE, MAKING USE OF HIGH THROUGHPUT TECHNOLOGIES AS DESCRIBED BY CMS-2020-01-R: HCPCS

## 2020-06-16 LAB — ALDOLASE SERPL-CCNC: 4 U/L (ref 3.3–10.3)

## 2020-06-17 DIAGNOSIS — A69.23 LYME ARTHRITIS (HCC): Primary | ICD-10-CM

## 2020-06-17 LAB
B BURGDOR IGG PATRN SER IB-IMP: POSITIVE
B BURGDOR IGG+IGM SER-ACNC: 1.57 ISR (ref 0–0.9)
B BURGDOR IGM PATRN SER IB-IMP: NEGATIVE
B BURGDOR18KD IGG SER QL IB: PRESENT
B BURGDOR23KD IGG SER QL IB: PRESENT
B BURGDOR23KD IGM SER QL IB: ABNORMAL
B BURGDOR28KD IGG SER QL IB: ABNORMAL
B BURGDOR30KD IGG SER QL IB: ABNORMAL
B BURGDOR39KD IGG SER QL IB: PRESENT
B BURGDOR39KD IGM SER QL IB: ABNORMAL
B BURGDOR41KD IGG SER QL IB: PRESENT
B BURGDOR41KD IGM SER QL IB: ABNORMAL
B BURGDOR45KD IGG SER QL IB: PRESENT
B BURGDOR58KD IGG SER QL IB: PRESENT
B BURGDOR66KD IGG SER QL IB: PRESENT
B BURGDOR93KD IGG SER QL IB: PRESENT
SARS-COV-2 RNA SPEC QL NAA+PROBE: NOT DETECTED

## 2020-06-17 RX ORDER — DOXYCYCLINE HYCLATE 100 MG/1
100 CAPSULE ORAL EVERY 12 HOURS SCHEDULED
Qty: 42 CAPSULE | Refills: 0 | Status: SHIPPED | OUTPATIENT
Start: 2020-06-17 | End: 2020-07-08

## 2020-06-19 DIAGNOSIS — E03.9 ACQUIRED HYPOTHYROIDISM: Primary | ICD-10-CM

## 2020-06-19 RX ORDER — LEVOTHYROXINE SODIUM 0.05 MG/1
50 TABLET ORAL DAILY
Qty: 90 TABLET | Refills: 3 | Status: SHIPPED | OUTPATIENT
Start: 2020-06-19 | End: 2020-10-15 | Stop reason: DRUGHIGH

## 2020-06-30 ENCOUNTER — TELEPHONE (OUTPATIENT)
Dept: INTERNAL MEDICINE CLINIC | Facility: CLINIC | Age: 70
End: 2020-06-30

## 2020-07-01 ENCOUNTER — OFFICE VISIT (OUTPATIENT)
Dept: INTERNAL MEDICINE CLINIC | Facility: CLINIC | Age: 70
End: 2020-07-01
Payer: MEDICARE

## 2020-07-01 VITALS
RESPIRATION RATE: 20 BRPM | DIASTOLIC BLOOD PRESSURE: 74 MMHG | BODY MASS INDEX: 31.31 KG/M2 | HEART RATE: 78 BPM | WEIGHT: 194 LBS | TEMPERATURE: 98.1 F | SYSTOLIC BLOOD PRESSURE: 142 MMHG

## 2020-07-01 DIAGNOSIS — I10 HYPERTENSION, ESSENTIAL: ICD-10-CM

## 2020-07-01 DIAGNOSIS — J41.0 SIMPLE CHRONIC BRONCHITIS (HCC): Primary | ICD-10-CM

## 2020-07-01 DIAGNOSIS — I50.42 CHRONIC COMBINED SYSTOLIC AND DIASTOLIC CONGESTIVE HEART FAILURE (HCC): ICD-10-CM

## 2020-07-01 DIAGNOSIS — M13.0 POLYARTHROPATHY: ICD-10-CM

## 2020-07-01 DIAGNOSIS — F41.8 ANXIETY ASSOCIATED WITH DEPRESSION: ICD-10-CM

## 2020-07-01 DIAGNOSIS — A69.23 LYME ARTHRITIS (HCC): ICD-10-CM

## 2020-07-01 PROCEDURE — 1160F RVW MEDS BY RX/DR IN RCRD: CPT | Performed by: PHYSICIAN ASSISTANT

## 2020-07-01 PROCEDURE — 4040F PNEUMOC VAC/ADMIN/RCVD: CPT | Performed by: PHYSICIAN ASSISTANT

## 2020-07-01 PROCEDURE — 3078F DIAST BP <80 MM HG: CPT | Performed by: PHYSICIAN ASSISTANT

## 2020-07-01 PROCEDURE — 99214 OFFICE O/P EST MOD 30 MIN: CPT | Performed by: PHYSICIAN ASSISTANT

## 2020-07-01 PROCEDURE — 1036F TOBACCO NON-USER: CPT | Performed by: PHYSICIAN ASSISTANT

## 2020-07-01 PROCEDURE — 3077F SYST BP >= 140 MM HG: CPT | Performed by: PHYSICIAN ASSISTANT

## 2020-07-01 RX ORDER — PREDNISONE 10 MG/1
TABLET ORAL
Qty: 20 TABLET | Refills: 0 | Status: SHIPPED | OUTPATIENT
Start: 2020-07-01 | End: 2021-04-05 | Stop reason: SDUPTHER

## 2020-07-01 NOTE — PROGRESS NOTES
Assessment/Plan: continues with generalized aching and fatigue with minor exertion  No improvement since being treated for Lyme disease  Also now on a thyroid replacement for hypothyroidism recently put on amiodarone  For atrial arrhythmia during his hospitalization He is unable to take  Nonsteroidals  His oncologist has warned against Tylenol  Will try a course of steroids and follow-up in 2 weeks       Diagnoses and all orders for this visit:    Simple chronic bronchitis (HCC)    Chronic combined systolic and diastolic congestive heart failure (HCC)    Hypertension, essential    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HCC)    Polyarthropathy  -     predniSONE 10 mg tablet; Take 40 milligrams for 2 days, 30 milligrams for 2 days, 20 milligrams for 2 days, 10 milligrams for 2 days, then stop    Anxiety associated with depression        No problem-specific Assessment & Plan notes found for this encounter  Subjective:      Patient ID: Ced Norton is a 79 y o  male  Seen in follow-up he continues to have pain aching generalized worse with any exertion at all complains of weakness generalized with any attempt at doing some work  Currently getting radiation for recently discovered lung cancer recent workup indicated Lyme disease and a elevated C reactive protein currently on doxycycline for Lyme disease for 2 weeks so far with no improvement in his aching  He has a known cardiomyopathy followed by Cardiology no CHF chest pain orthopnea or PND  He has an element of COPD and he is followed by pulmonary  No chest pain or hemoptysis      The following portions of the patient's history were reviewed and updated as appropriate:   He has a past medical history of Cancer ([address] ), Hyperlipidemia, and Nonischemic cardiomyopathy ([facility] )  ,  does not have any pertinent problems on file  ,   has a past surgical history that includes Cardiac catheterization; Lipoma resection; Rotator cuff repair; and Lung cancer surgery  ,  family history includes No Known Problems in his father and mother  He was adopted  ,   reports that he quit smoking about 21 years ago  He has a 20 00 pack-year smoking history  He has never used smokeless tobacco  He reports that he drank about 6 0 standard drinks of alcohol per week  He reports that he does not use drugs  ,  is allergic to pollen extract     Current Outpatient Medications   Medication Sig Dispense Refill    albuterol (PROVENTIL HFA,VENTOLIN HFA) 90 mcg/act inhaler 2 puff(s)      amiodarone 200 mg tablet Take 1 tablet (200 mg total) by mouth daily 90 tablet 3    apixaban (ELIQUIS) 5 mg Take 1 tablet (5 mg total) by mouth 2 (two) times a day 60 tablet 11    cyclobenzaprine (FLEXERIL) 5 mg tablet Take 5 mg by mouth 3 (three) times a day as needed       doxycycline hyclate (VIBRAMYCIN) 100 mg capsule Take 1 capsule (100 mg total) by mouth every 12 (twelve) hours for 21 days 42 capsule 0    fluticasone (FLONASE) 50 mcg/act nasal spray       furosemide (LASIX) 20 mg tablet Take 1 tablet by mouth daily      levothyroxine 50 mcg tablet Take 1 tablet (50 mcg total) by mouth daily 90 tablet 3    lisinopril (ZESTRIL) 10 mg tablet Take 1 tablet (10 mg total) by mouth daily 90 tablet 3    metoprolol tartrate (LOPRESSOR) 50 mg tablet Take 1 tablet (50 mg total) by mouth 2 (two) times a day 180 tablet 3    montelukast (SINGULAIR) 10 mg tablet 1 tab(s)      nitroglycerin (NITROSTAT) 0 4 mg SL tablet Place 1 tablet under the tongue      rOPINIRole (REQUIP) 0 5 mg tablet Take 1 tablet (0 5 mg total) by mouth daily at bedtime 90 tablet 3    TRELEGY ELLIPTA 100-62 5-25 MCG/INH inhaler Inhale 1 puff daily      predniSONE 10 mg tablet Take 40 milligrams for 2 days, 30 milligrams for 2 days, 20 milligrams for 2 days, 10 milligrams for 2 days, then stop 20 tablet 0    senna (SENOKOT) 8 6 MG tablet Take 1 tablet by mouth daily       No current facility-administered medications for this visit          Review of Systems Constitutional: Positive for fatigue  Negative for activity change, appetite change, chills, diaphoresis, fever and unexpected weight change  HENT: Negative for congestion, dental problem, drooling, ear discharge, ear pain, facial swelling, hearing loss, nosebleeds, postnasal drip, rhinorrhea, sinus pressure, sinus pain, sneezing, sore throat, tinnitus, trouble swallowing and voice change  Eyes: Negative for photophobia, pain, discharge, redness, itching and visual disturbance  Respiratory: Positive for shortness of breath  Negative for apnea, cough, choking, chest tightness, wheezing and stridor  Cardiovascular: Negative for chest pain, palpitations and leg swelling  Gastrointestinal: Negative for abdominal distention, abdominal pain, anal bleeding, blood in stool, constipation, diarrhea, nausea, rectal pain and vomiting  Endocrine: Negative for cold intolerance, heat intolerance, polydipsia, polyphagia and polyuria  Genitourinary: Negative for decreased urine volume, difficulty urinating, dysuria, enuresis, flank pain, frequency, genital sores, hematuria and urgency  Musculoskeletal: Positive for arthralgias and myalgias  Negative for back pain, gait problem, joint swelling, neck pain and neck stiffness  Skin: Negative for color change, pallor, rash and wound  Neurological: Positive for weakness  Negative for dizziness, tremors, seizures, syncope, facial asymmetry, speech difficulty, light-headedness, numbness and headaches  Hematological: Negative for adenopathy  Does not bruise/bleed easily  Psychiatric/Behavioral: Negative for agitation, behavioral problems, confusion, decreased concentration, dysphoric mood, hallucinations, self-injury, sleep disturbance and suicidal ideas  The patient is not nervous/anxious and is not hyperactive            Objective:  Vitals:    07/01/20 0949   BP: 142/74   Pulse: 78   Resp: 20   Temp: 98 1 °F (36 7 °C)   Weight: 88 kg (194 lb)     Body mass index is 31 31 kg/m²  Physical Exam   Constitutional: He is oriented to person, place, and time  He appears well-developed  HENT:   Head: Normocephalic  Right Ear: External ear normal    Left Ear: External ear normal    Mouth/Throat: Oropharynx is clear and moist    Eyes: Pupils are equal, round, and reactive to light  Conjunctivae are normal    Neck: Neck supple  No JVD present  No thyromegaly present  Cardiovascular: Normal rate, regular rhythm and intact distal pulses  Exam reveals gallop ( S4)  No murmur heard  Pulmonary/Chest: Effort normal  No stridor  No respiratory distress  He has no wheezes  He has rales ( scattered coarse rales)  He exhibits no tenderness  Abdominal: Soft  Bowel sounds are normal  He exhibits no distension and no mass  There is no tenderness  There is no guarding  Musculoskeletal: Normal range of motion  He exhibits no edema  Lymphadenopathy:     He has no cervical adenopathy  Neurological: He is alert and oriented to person, place, and time  He has normal reflexes  Skin: Skin is warm and dry  Vitals reviewed

## 2020-07-15 ENCOUNTER — OFFICE VISIT (OUTPATIENT)
Dept: INTERNAL MEDICINE CLINIC | Facility: CLINIC | Age: 70
End: 2020-07-15
Payer: MEDICARE

## 2020-07-15 VITALS
WEIGHT: 198.2 LBS | RESPIRATION RATE: 16 BRPM | BODY MASS INDEX: 31.85 KG/M2 | SYSTOLIC BLOOD PRESSURE: 140 MMHG | HEIGHT: 66 IN | TEMPERATURE: 98.4 F | DIASTOLIC BLOOD PRESSURE: 80 MMHG | HEART RATE: 84 BPM

## 2020-07-15 DIAGNOSIS — F41.8 ANXIETY ASSOCIATED WITH DEPRESSION: ICD-10-CM

## 2020-07-15 DIAGNOSIS — M13.0 POLYARTHROPATHY: ICD-10-CM

## 2020-07-15 DIAGNOSIS — I10 HYPERTENSION, ESSENTIAL: ICD-10-CM

## 2020-07-15 DIAGNOSIS — J41.0 SIMPLE CHRONIC BRONCHITIS (HCC): Primary | ICD-10-CM

## 2020-07-15 DIAGNOSIS — M19.90 INFLAMMATORY ARTHROPATHY: ICD-10-CM

## 2020-07-15 DIAGNOSIS — A69.23 LYME ARTHRITIS (HCC): ICD-10-CM

## 2020-07-15 PROCEDURE — 3008F BODY MASS INDEX DOCD: CPT | Performed by: PHYSICIAN ASSISTANT

## 2020-07-15 PROCEDURE — 3079F DIAST BP 80-89 MM HG: CPT | Performed by: PHYSICIAN ASSISTANT

## 2020-07-15 PROCEDURE — 4040F PNEUMOC VAC/ADMIN/RCVD: CPT | Performed by: PHYSICIAN ASSISTANT

## 2020-07-15 PROCEDURE — 3077F SYST BP >= 140 MM HG: CPT | Performed by: PHYSICIAN ASSISTANT

## 2020-07-15 PROCEDURE — 1160F RVW MEDS BY RX/DR IN RCRD: CPT | Performed by: PHYSICIAN ASSISTANT

## 2020-07-15 PROCEDURE — 99214 OFFICE O/P EST MOD 30 MIN: CPT | Performed by: PHYSICIAN ASSISTANT

## 2020-07-15 PROCEDURE — 1036F TOBACCO NON-USER: CPT | Performed by: PHYSICIAN ASSISTANT

## 2020-07-15 NOTE — PROGRESS NOTES
Assessment/Plan: I reviewed recent oncology note  Feeling much better  Working every day last prednisone was 2 days ago still feeling good being treated for Lyme disease  He will return if his arthropathy symptoms return  Follow-up in October continue other regular follow-up   with Pulmonary and Oncology     Diagnoses and all orders for this visit:    Simple chronic bronchitis ([facility] )    Hypertension, essential    Inflammatory arthropathy    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[facility] )    Polyarthropathy    Anxiety associated with depression        No problem-specific Assessment & Plan notes found for this encounter  Subjective:      Patient ID: Ced Norton is a 79 y o  male  Seen 2 weeks ago because of increased aching and fatigue  He is being treated for Lyme disease  He was put on a course of prednisone and is aching and fatigue nearly completely resolved  Finished the steroids 2 days ago continues to have minimal aching and fatigue  History of Lyme disease and recent chemo radiation for lung cancer  History of lobectomy  Followed by Pulmonary and Oncology  Anticoagulated because of paroxysmal Afib at the time of his lobectomy maintained on amiodarone  Now hypothyroid on replacement T4 is normal      The following portions of the patient's history were reviewed and updated as appropriate:   He has a past medical history of Cancer ([facility] ), Hyperlipidemia, and Nonischemic cardiomyopathy ([facility] )  ,  does not have any pertinent problems on file  ,   has a past surgical history that includes Cardiac catheterization; Lipoma resection; Rotator cuff repair; and Lung cancer surgery  ,  family history includes No Known Problems in his father and mother  He was adopted  ,   reports that he quit smoking about 21 years ago  He has a 20 00 pack-year smoking history  He has never used smokeless tobacco  He reports that he drank about 6 0 standard drinks of alcohol per week  He reports that he does not use drugs  ,  is allergic to pollen extract     Current Outpatient Medications   Medication Sig Dispense Refill    albuterol (PROVENTIL HFA,VENTOLIN HFA) 90 mcg/act inhaler 2 puff(s)      amiodarone 200 mg tablet Take 1 tablet (200 mg total) by mouth daily 90 tablet 3    apixaban (ELIQUIS) 5 mg Take 1 tablet (5 mg total) by mouth 2 (two) times a day 60 tablet 11    cyclobenzaprine (FLEXERIL) 5 mg tablet Take 5 mg by mouth 3 (three) times a day as needed       fluticasone (FLONASE) 50 mcg/act nasal spray       furosemide (LASIX) 20 mg tablet Take 1 tablet by mouth daily      levothyroxine 50 mcg tablet Take 1 tablet (50 mcg total) by mouth daily 90 tablet 3    lisinopril (ZESTRIL) 10 mg tablet Take 1 tablet (10 mg total) by mouth daily 90 tablet 3    metoprolol tartrate (LOPRESSOR) 50 mg tablet Take 1 tablet (50 mg total) by mouth 2 (two) times a day 180 tablet 3    montelukast (SINGULAIR) 10 mg tablet 1 tab(s)      nitroglycerin (NITROSTAT) 0 4 mg SL tablet Place 1 tablet under the tongue      predniSONE 10 mg tablet Take 40 milligrams for 2 days, 30 milligrams for 2 days, 20 milligrams for 2 days, 10 milligrams for 2 days, then stop 20 tablet 0    rOPINIRole (REQUIP) 0 5 mg tablet Take 1 tablet (0 5 mg total) by mouth daily at bedtime 90 tablet 3    senna (SENOKOT) 8 6 MG tablet Take 1 tablet by mouth daily      TRELEGY ELLIPTA 100-62 5-25 MCG/INH inhaler Inhale 1 puff daily       No current facility-administered medications for this visit  Review of Systems   Constitutional: Negative for activity change, appetite change, chills, diaphoresis, fatigue, fever and unexpected weight change  HENT: Negative for congestion, dental problem, drooling, ear discharge, ear pain, facial swelling, hearing loss, nosebleeds, postnasal drip, rhinorrhea, sinus pressure, sinus pain, sneezing, sore throat, tinnitus, trouble swallowing and voice change      Eyes: Negative for photophobia, pain, discharge, redness, itching and visual disturbance  Respiratory: Positive for shortness of breath  Negative for apnea, cough, choking, chest tightness, wheezing and stridor  Cardiovascular: Negative for chest pain, palpitations and leg swelling  Gastrointestinal: Negative for abdominal distention, abdominal pain, anal bleeding, blood in stool, constipation, diarrhea, nausea, rectal pain and vomiting  Endocrine: Negative for cold intolerance, heat intolerance, polydipsia, polyphagia and polyuria  Genitourinary: Negative for decreased urine volume, difficulty urinating, dysuria, enuresis, flank pain, frequency, genital sores, hematuria and urgency  Musculoskeletal: Negative for arthralgias, back pain, gait problem, joint swelling, myalgias, neck pain and neck stiffness  Skin: Negative for color change, pallor, rash and wound  Neurological: Negative for dizziness, tremors, seizures, syncope, facial asymmetry, speech difficulty, weakness, light-headedness, numbness and headaches  Hematological: Negative for adenopathy  Does not bruise/bleed easily  Psychiatric/Behavioral: Negative for agitation, behavioral problems, confusion, decreased concentration, dysphoric mood, hallucinations, self-injury, sleep disturbance and suicidal ideas  The patient is not nervous/anxious and is not hyperactive  Objective:  Vitals:    07/15/20 0935   BP: 140/80   BP Location: Left arm   Patient Position: Sitting   Cuff Size: Standard   Pulse: 84   Resp: 16   Temp: 98 4 °F (36 9 °C)   TempSrc: Temporal   Weight: 89 9 kg (198 lb 3 2 oz)   Height: 5' 6" (1 676 m)     Body mass index is 31 99 kg/m²  Physical Exam   Constitutional: He is oriented to person, place, and time  He appears well-developed  HENT:   Head: Normocephalic  Right Ear: External ear normal    Left Ear: External ear normal    Nose: Nose normal    Mouth/Throat: Oropharynx is clear and moist    Eyes: Pupils are equal, round, and reactive to light   Conjunctivae are normal    Neck: Neck supple  No JVD present  No thyromegaly present  Cardiovascular: Normal rate, regular rhythm, normal heart sounds and intact distal pulses  No murmur heard  Pulmonary/Chest: Effort normal and breath sounds normal  No stridor  No respiratory distress  He has no wheezes  Abdominal: Soft  Bowel sounds are normal    Musculoskeletal: Normal range of motion  He exhibits no edema  Lymphadenopathy:     He has no cervical adenopathy  Neurological: He is alert and oriented to person, place, and time  He has normal reflexes  Skin: Skin is warm and dry  Psychiatric: He has a normal mood and affect  His behavior is normal  Judgment and thought content normal    Vitals reviewed

## 2020-08-26 ENCOUNTER — TELEPHONE (OUTPATIENT)
Dept: CARDIOLOGY CLINIC | Facility: CLINIC | Age: 70
End: 2020-08-26

## 2020-08-26 NOTE — TELEPHONE ENCOUNTER
Spoke with patients wife to verify medication  Amlodipine is not on his current med list,   She stated that it was her mistake   He dose not need any refills at this time

## 2020-09-22 ENCOUNTER — TELEPHONE (OUTPATIENT)
Dept: INTERNAL MEDICINE CLINIC | Facility: CLINIC | Age: 70
End: 2020-09-22

## 2020-09-22 NOTE — TELEPHONE ENCOUNTER
Left detailed message informing patient that the patient needs to call the surgeon and get the COVID order from them

## 2020-09-22 NOTE — TELEPHONE ENCOUNTER
PT HAVING   EYE  SURGURY   IN  [place]  NEXT  WEEK    WIFE  IS  SAYING  HE  NEEDS  A  COVID  TEST  BEFORE  SURG[facility]YTON  WANTS  TO  KNOW  IF  WE  CAN  ORDER  IT   CALL PT  [phone]

## 2020-10-15 ENCOUNTER — OFFICE VISIT (OUTPATIENT)
Dept: INTERNAL MEDICINE CLINIC | Facility: CLINIC | Age: 70
End: 2020-10-15
Payer: MEDICARE

## 2020-10-15 VITALS
TEMPERATURE: 97.1 F | BODY MASS INDEX: 32.33 KG/M2 | DIASTOLIC BLOOD PRESSURE: 100 MMHG | HEIGHT: 66 IN | SYSTOLIC BLOOD PRESSURE: 160 MMHG | WEIGHT: 201.2 LBS | OXYGEN SATURATION: 93 % | HEART RATE: 64 BPM

## 2020-10-15 DIAGNOSIS — J41.0 SIMPLE CHRONIC BRONCHITIS (HCC): ICD-10-CM

## 2020-10-15 DIAGNOSIS — Z23 NEED FOR INFLUENZA VACCINATION: Primary | ICD-10-CM

## 2020-10-15 DIAGNOSIS — M19.90 INFLAMMATORY ARTHROPATHY: ICD-10-CM

## 2020-10-15 DIAGNOSIS — F41.8 ANXIETY ASSOCIATED WITH DEPRESSION: ICD-10-CM

## 2020-10-15 DIAGNOSIS — A69.23 LYME ARTHRITIS (HCC): ICD-10-CM

## 2020-10-15 DIAGNOSIS — E78.00 HIGH CHOLESTEROL: ICD-10-CM

## 2020-10-15 DIAGNOSIS — I10 HYPERTENSION, ESSENTIAL: ICD-10-CM

## 2020-10-15 PROCEDURE — 90662 IIV NO PRSV INCREASED AG IM: CPT | Performed by: PHYSICIAN ASSISTANT

## 2020-10-15 PROCEDURE — 99214 OFFICE O/P EST MOD 30 MIN: CPT | Performed by: PHYSICIAN ASSISTANT

## 2020-10-15 PROCEDURE — G0008 ADMIN INFLUENZA VIRUS VAC: HCPCS | Performed by: PHYSICIAN ASSISTANT

## 2020-10-15 RX ORDER — BESIFLOXACIN 6 MG/ML
SUSPENSION OPHTHALMIC 2 TIMES DAILY
COMMUNITY
Start: 2020-09-23 | End: 2021-04-05 | Stop reason: SDUPTHER

## 2020-10-15 RX ORDER — PREDNISOLONE ACETATE 10 MG/ML
1 SUSPENSION/ DROPS OPHTHALMIC 2 TIMES DAILY
COMMUNITY
Start: 2020-09-23 | End: 2021-06-19

## 2020-10-15 RX ORDER — AMLODIPINE BESYLATE 5 MG/1
5 TABLET ORAL DAILY
Qty: 30 TABLET | Refills: 3 | Status: SHIPPED | OUTPATIENT
Start: 2020-10-15 | End: 2020-10-22 | Stop reason: SDUPTHER

## 2020-10-15 RX ORDER — TAMSULOSIN HYDROCHLORIDE 0.4 MG/1
CAPSULE ORAL
COMMUNITY
Start: 2020-08-13 | End: 2021-04-05 | Stop reason: SDUPTHER

## 2020-10-15 RX ORDER — BROMFENAC SODIUM 0.7 MG/ML
SOLUTION/ DROPS OPHTHALMIC 2 TIMES DAILY
COMMUNITY
Start: 2020-09-24 | End: 2021-07-14 | Stop reason: ALTCHOICE

## 2020-10-15 RX ORDER — DOXYCYCLINE HYCLATE 100 MG/1
100 CAPSULE ORAL EVERY 12 HOURS SCHEDULED
Qty: 28 CAPSULE | Refills: 0 | Status: SHIPPED | OUTPATIENT
Start: 2020-10-15 | End: 2020-10-29

## 2020-10-15 RX ORDER — LEVOTHYROXINE SODIUM 75 UG/1
75 CAPSULE ORAL DAILY
COMMUNITY
Start: 2020-09-01 | End: 2021-04-05 | Stop reason: SDUPTHER

## 2020-10-22 ENCOUNTER — LAB (OUTPATIENT)
Dept: LAB | Facility: CLINIC | Age: 70
End: 2020-10-22
Payer: MEDICARE

## 2020-10-22 ENCOUNTER — OFFICE VISIT (OUTPATIENT)
Dept: INTERNAL MEDICINE CLINIC | Facility: CLINIC | Age: 70
End: 2020-10-22
Payer: MEDICARE

## 2020-10-22 VITALS
HEART RATE: 61 BPM | TEMPERATURE: 97.9 F | WEIGHT: 202 LBS | SYSTOLIC BLOOD PRESSURE: 122 MMHG | DIASTOLIC BLOOD PRESSURE: 84 MMHG | OXYGEN SATURATION: 97 % | BODY MASS INDEX: 32.6 KG/M2

## 2020-10-22 DIAGNOSIS — I10 HYPERTENSION, ESSENTIAL: ICD-10-CM

## 2020-10-22 DIAGNOSIS — R42 DIZZY: ICD-10-CM

## 2020-10-22 DIAGNOSIS — N18.32 STAGE 3B CHRONIC KIDNEY DISEASE (HCC): Primary | ICD-10-CM

## 2020-10-22 DIAGNOSIS — M13.0 POLYARTHROPATHY: ICD-10-CM

## 2020-10-22 DIAGNOSIS — C34.31 MALIGNANT NEOPLASM OF LOWER LOBE OF RIGHT LUNG (HCC): ICD-10-CM

## 2020-10-22 DIAGNOSIS — J41.0 SIMPLE CHRONIC BRONCHITIS (HCC): Primary | ICD-10-CM

## 2020-10-22 DIAGNOSIS — F41.8 ANXIETY ASSOCIATED WITH DEPRESSION: ICD-10-CM

## 2020-10-22 DIAGNOSIS — I48.91 ATRIAL FIBRILLATION, UNSPECIFIED TYPE (HCC): ICD-10-CM

## 2020-10-22 LAB
ALBUMIN SERPL BCP-MCNC: 3.2 G/DL (ref 3.5–5)
ALP SERPL-CCNC: 77 U/L (ref 46–116)
ALT SERPL W P-5'-P-CCNC: 25 U/L (ref 12–78)
ANION GAP SERPL CALCULATED.3IONS-SCNC: 5 MMOL/L (ref 4–13)
AST SERPL W P-5'-P-CCNC: 23 U/L (ref 5–45)
BACTERIA UR QL AUTO: ABNORMAL /HPF
BASOPHILS # BLD AUTO: 0.05 THOUSANDS/ΜL (ref 0–0.1)
BASOPHILS NFR BLD AUTO: 1 % (ref 0–1)
BILIRUB SERPL-MCNC: 0.4 MG/DL (ref 0.2–1)
BILIRUB UR QL STRIP: NEGATIVE
BUN SERPL-MCNC: 23 MG/DL (ref 5–25)
CALCIUM ALBUM COR SERPL-MCNC: 9.8 MG/DL (ref 8.3–10.1)
CALCIUM SERPL-MCNC: 9.2 MG/DL (ref 8.3–10.1)
CHLORIDE SERPL-SCNC: 110 MMOL/L (ref 100–108)
CLARITY UR: CLEAR
CO2 SERPL-SCNC: 24 MMOL/L (ref 21–32)
COLOR UR: YELLOW
CREAT SERPL-MCNC: 1.72 MG/DL (ref 0.6–1.3)
EOSINOPHIL # BLD AUTO: 0.64 THOUSAND/ΜL (ref 0–0.61)
EOSINOPHIL NFR BLD AUTO: 9 % (ref 0–6)
ERYTHROCYTE [DISTWIDTH] IN BLOOD BY AUTOMATED COUNT: 13 % (ref 11.6–15.1)
GFR SERPL CREATININE-BSD FRML MDRD: 39 ML/MIN/1.73SQ M
GLUCOSE P FAST SERPL-MCNC: 91 MG/DL (ref 65–99)
GLUCOSE UR STRIP-MCNC: NEGATIVE MG/DL
HCT VFR BLD AUTO: 41.7 % (ref 36.5–49.3)
HGB BLD-MCNC: 13.7 G/DL (ref 12–17)
HGB UR QL STRIP.AUTO: NEGATIVE
HYALINE CASTS #/AREA URNS LPF: ABNORMAL /LPF
IMM GRANULOCYTES # BLD AUTO: 0.05 THOUSAND/UL (ref 0–0.2)
IMM GRANULOCYTES NFR BLD AUTO: 1 % (ref 0–2)
KETONES UR STRIP-MCNC: NEGATIVE MG/DL
LEUKOCYTE ESTERASE UR QL STRIP: ABNORMAL
LYMPHOCYTES # BLD AUTO: 1.19 THOUSANDS/ΜL (ref 0.6–4.47)
LYMPHOCYTES NFR BLD AUTO: 17 % (ref 14–44)
MCH RBC QN AUTO: 29.8 PG (ref 26.8–34.3)
MCHC RBC AUTO-ENTMCNC: 32.9 G/DL (ref 31.4–37.4)
MCV RBC AUTO: 91 FL (ref 82–98)
MONOCYTES # BLD AUTO: 0.83 THOUSAND/ΜL (ref 0.17–1.22)
MONOCYTES NFR BLD AUTO: 12 % (ref 4–12)
NEUTROPHILS # BLD AUTO: 4.45 THOUSANDS/ΜL (ref 1.85–7.62)
NEUTS SEG NFR BLD AUTO: 60 % (ref 43–75)
NITRITE UR QL STRIP: NEGATIVE
NON-SQ EPI CELLS URNS QL MICRO: ABNORMAL /HPF
NRBC BLD AUTO-RTO: 0 /100 WBCS
PH UR STRIP.AUTO: 5.5 [PH]
PLATELET # BLD AUTO: 254 THOUSANDS/UL (ref 149–390)
PMV BLD AUTO: 10.1 FL (ref 8.9–12.7)
POTASSIUM SERPL-SCNC: 4.1 MMOL/L (ref 3.5–5.3)
PROT SERPL-MCNC: 8 G/DL (ref 6.4–8.2)
PROT UR STRIP-MCNC: ABNORMAL MG/DL
RBC # BLD AUTO: 4.59 MILLION/UL (ref 3.88–5.62)
RBC #/AREA URNS AUTO: ABNORMAL /HPF
SODIUM SERPL-SCNC: 139 MMOL/L (ref 136–145)
SP GR UR STRIP.AUTO: 1.02 (ref 1–1.03)
UROBILINOGEN UR QL STRIP.AUTO: 0.2 E.U./DL
WBC # BLD AUTO: 7.21 THOUSAND/UL (ref 4.31–10.16)
WBC #/AREA URNS AUTO: ABNORMAL /HPF

## 2020-10-22 PROCEDURE — 36415 COLL VENOUS BLD VENIPUNCTURE: CPT

## 2020-10-22 PROCEDURE — 81001 URINALYSIS AUTO W/SCOPE: CPT | Performed by: PHYSICIAN ASSISTANT

## 2020-10-22 PROCEDURE — 85025 COMPLETE CBC W/AUTO DIFF WBC: CPT

## 2020-10-22 PROCEDURE — 99214 OFFICE O/P EST MOD 30 MIN: CPT | Performed by: PHYSICIAN ASSISTANT

## 2020-10-22 PROCEDURE — 80053 COMPREHEN METABOLIC PANEL: CPT

## 2020-10-22 RX ORDER — AMLODIPINE BESYLATE 2.5 MG/1
2.5 TABLET ORAL DAILY
Qty: 30 TABLET | Refills: 0
Start: 2020-10-22 | End: 2021-04-05 | Stop reason: SDUPTHER

## 2020-10-25 ENCOUNTER — HOSPITAL ENCOUNTER (EMERGENCY)
Facility: HOSPITAL | Age: 70
Discharge: HOME | End: 2020-10-25
Attending: EMERGENCY MEDICINE | Admitting: EMERGENCY MEDICINE
Payer: MEDICARE

## 2020-10-25 ENCOUNTER — APPOINTMENT (EMERGENCY)
Dept: CT IMAGING | Facility: HOSPITAL | Age: 70
End: 2020-10-25
Payer: MEDICARE

## 2020-10-25 VITALS
RESPIRATION RATE: 18 BRPM | SYSTOLIC BLOOD PRESSURE: 172 MMHG | HEIGHT: 66 IN | TEMPERATURE: 98.6 F | BODY MASS INDEX: 32.95 KG/M2 | HEART RATE: 96 BPM | DIASTOLIC BLOOD PRESSURE: 81 MMHG | WEIGHT: 205.03 LBS | OXYGEN SATURATION: 96 %

## 2020-10-25 DIAGNOSIS — M54.50 ACUTE RIGHT-SIDED LOW BACK PAIN WITHOUT SCIATICA: Primary | ICD-10-CM

## 2020-10-25 DIAGNOSIS — K57.92 DIVERTICULITIS: ICD-10-CM

## 2020-10-25 LAB
ALBUMIN SERPL BCP-MCNC: 3.3 G/DL (ref 3.5–5)
ALP SERPL-CCNC: 74 U/L (ref 46–116)
ALT SERPL W P-5'-P-CCNC: 27 U/L (ref 12–78)
ANION GAP SERPL CALCULATED.3IONS-SCNC: 12 MMOL/L (ref 4–13)
AST SERPL W P-5'-P-CCNC: 20 U/L (ref 5–45)
BACTERIA UR QL AUTO: NORMAL /HPF
BASOPHILS # BLD AUTO: 0.02 THOUSANDS/ΜL (ref 0–0.1)
BASOPHILS NFR BLD AUTO: 0 % (ref 0–1)
BILIRUB SERPL-MCNC: 0.5 MG/DL (ref 0.2–1)
BILIRUB UR QL STRIP: NEGATIVE
BUN SERPL-MCNC: 24 MG/DL (ref 5–25)
CALCIUM ALBUM COR SERPL-MCNC: 9.3 MG/DL (ref 8.3–10.1)
CALCIUM SERPL-MCNC: 8.7 MG/DL (ref 8.3–10.1)
CHLORIDE SERPL-SCNC: 101 MMOL/L (ref 100–108)
CLARITY UR: CLEAR
CO2 SERPL-SCNC: 22 MMOL/L (ref 21–32)
COLOR UR: YELLOW
CREAT SERPL-MCNC: 1.63 MG/DL (ref 0.6–1.3)
EOSINOPHIL # BLD AUTO: 0.11 THOUSAND/ΜL (ref 0–0.61)
EOSINOPHIL NFR BLD AUTO: 1 % (ref 0–6)
ERYTHROCYTE [DISTWIDTH] IN BLOOD BY AUTOMATED COUNT: 12.9 % (ref 11.6–15.1)
GFR SERPL CREATININE-BSD FRML MDRD: 42 ML/MIN/1.73SQ M
GLUCOSE SERPL-MCNC: 111 MG/DL (ref 65–140)
GLUCOSE UR STRIP-MCNC: NEGATIVE MG/DL
HCT VFR BLD AUTO: 41.6 % (ref 36.5–49.3)
HGB BLD-MCNC: 13.6 G/DL (ref 12–17)
HGB UR QL STRIP.AUTO: NEGATIVE
IMM GRANULOCYTES # BLD AUTO: 0.04 THOUSAND/UL (ref 0–0.2)
IMM GRANULOCYTES NFR BLD AUTO: 0 % (ref 0–2)
KETONES UR STRIP-MCNC: NEGATIVE MG/DL
LEUKOCYTE ESTERASE UR QL STRIP: ABNORMAL
LYMPHOCYTES # BLD AUTO: 0.39 THOUSANDS/ΜL (ref 0.6–4.47)
LYMPHOCYTES NFR BLD AUTO: 4 % (ref 14–44)
MCH RBC QN AUTO: 29.1 PG (ref 26.8–34.3)
MCHC RBC AUTO-ENTMCNC: 32.7 G/DL (ref 31.4–37.4)
MCV RBC AUTO: 89 FL (ref 82–98)
MONOCYTES # BLD AUTO: 0.37 THOUSAND/ΜL (ref 0.17–1.22)
MONOCYTES NFR BLD AUTO: 4 % (ref 4–12)
NEUTROPHILS # BLD AUTO: 8.52 THOUSANDS/ΜL (ref 1.85–7.62)
NEUTS SEG NFR BLD AUTO: 91 % (ref 43–75)
NITRITE UR QL STRIP: NEGATIVE
NON-SQ EPI CELLS URNS QL MICRO: NORMAL /HPF
NRBC BLD AUTO-RTO: 0 /100 WBCS
PH UR STRIP.AUTO: 5 [PH]
PLATELET # BLD AUTO: 180 THOUSANDS/UL (ref 149–390)
PMV BLD AUTO: 9.2 FL (ref 8.9–12.7)
POTASSIUM SERPL-SCNC: 3.6 MMOL/L (ref 3.5–5.3)
PROT SERPL-MCNC: 7.9 G/DL (ref 6.4–8.2)
PROT UR STRIP-MCNC: NEGATIVE MG/DL
RBC # BLD AUTO: 4.68 MILLION/UL (ref 3.88–5.62)
RBC #/AREA URNS AUTO: NORMAL /HPF
SODIUM SERPL-SCNC: 135 MMOL/L (ref 136–145)
SP GR UR STRIP.AUTO: 1.02 (ref 1–1.03)
UROBILINOGEN UR QL STRIP.AUTO: 0.2 E.U./DL
WBC # BLD AUTO: 9.45 THOUSAND/UL (ref 4.31–10.16)
WBC #/AREA URNS AUTO: NORMAL /HPF

## 2020-10-25 PROCEDURE — 96366 THER/PROPH/DIAG IV INF ADDON: CPT

## 2020-10-25 PROCEDURE — G1004 CDSM NDSC: HCPCS

## 2020-10-25 PROCEDURE — 96365 THER/PROPH/DIAG IV INF INIT: CPT

## 2020-10-25 PROCEDURE — 85025 COMPLETE CBC W/AUTO DIFF WBC: CPT | Performed by: EMERGENCY MEDICINE

## 2020-10-25 PROCEDURE — 36415 COLL VENOUS BLD VENIPUNCTURE: CPT | Performed by: EMERGENCY MEDICINE

## 2020-10-25 PROCEDURE — 99285 EMERGENCY DEPT VISIT HI MDM: CPT | Performed by: EMERGENCY MEDICINE

## 2020-10-25 PROCEDURE — 96375 TX/PRO/DX INJ NEW DRUG ADDON: CPT

## 2020-10-25 PROCEDURE — 80053 COMPREHEN METABOLIC PANEL: CPT | Performed by: EMERGENCY MEDICINE

## 2020-10-25 PROCEDURE — 81001 URINALYSIS AUTO W/SCOPE: CPT | Performed by: EMERGENCY MEDICINE

## 2020-10-25 PROCEDURE — 93005 ELECTROCARDIOGRAM TRACING: CPT

## 2020-10-25 PROCEDURE — 74176 CT ABD & PELVIS W/O CONTRAST: CPT

## 2020-10-25 PROCEDURE — 99284 EMERGENCY DEPT VISIT MOD MDM: CPT

## 2020-10-25 RX ORDER — HYDROMORPHONE HCL/PF 1 MG/ML
0.5 SYRINGE (ML) INJECTION ONCE
Status: COMPLETED | OUTPATIENT
Start: 2020-10-25 | End: 2020-10-25

## 2020-10-25 RX ORDER — FENTANYL CITRATE 50 UG/ML
1 INJECTION, SOLUTION INTRAMUSCULAR; INTRAVENOUS ONCE
Status: COMPLETED | OUTPATIENT
Start: 2020-10-25 | End: 2020-10-25

## 2020-10-25 RX ORDER — AMOXICILLIN AND CLAVULANATE POTASSIUM 875; 125 MG/1; MG/1
1 TABLET, FILM COATED ORAL ONCE
Status: COMPLETED | OUTPATIENT
Start: 2020-10-25 | End: 2020-10-25

## 2020-10-25 RX ORDER — AMOXICILLIN AND CLAVULANATE POTASSIUM 875; 125 MG/1; MG/1
1 TABLET, FILM COATED ORAL EVERY 12 HOURS
Qty: 14 TABLET | Refills: 0 | Status: SHIPPED | OUTPATIENT
Start: 2020-10-25 | End: 2020-11-01

## 2020-10-25 RX ORDER — OXYCODONE HYDROCHLORIDE AND ACETAMINOPHEN 5; 325 MG/1; MG/1
1 TABLET ORAL EVERY 4 HOURS PRN
Qty: 7 TABLET | Refills: 0 | Status: SHIPPED | OUTPATIENT
Start: 2020-10-25 | End: 2021-04-05

## 2020-10-25 RX ADMIN — AMOXICILLIN AND CLAVULANATE POTASSIUM 1 TABLET: 875; 125 TABLET, FILM COATED ORAL at 15:59

## 2020-10-25 RX ADMIN — SODIUM CHLORIDE, SODIUM LACTATE, POTASSIUM CHLORIDE, AND CALCIUM CHLORIDE 1000 ML: .6; .31; .03; .02 INJECTION, SOLUTION INTRAVENOUS at 13:20

## 2020-10-25 RX ADMIN — HYDROMORPHONE HYDROCHLORIDE 0.5 MG: 1 INJECTION, SOLUTION INTRAMUSCULAR; INTRAVENOUS; SUBCUTANEOUS at 15:58

## 2020-10-27 LAB
ATRIAL RATE: 94 BPM
P AXIS: 37 DEGREES
PR INTERVAL: 178 MS
QRS AXIS: -57 DEGREES
QRSD INTERVAL: 134 MS
QT INTERVAL: 378 MS
QTC INTERVAL: 472 MS
T WAVE AXIS: 76 DEGREES
VENTRICULAR RATE: 94 BPM

## 2020-10-27 PROCEDURE — 93010 ELECTROCARDIOGRAM REPORT: CPT | Performed by: INTERNAL MEDICINE

## 2020-10-30 ENCOUNTER — TELEPHONE (OUTPATIENT)
Dept: CARDIOLOGY CLINIC | Facility: CLINIC | Age: 70
End: 2020-10-30

## 2020-12-12 ENCOUNTER — TELEPHONE (OUTPATIENT)
Dept: INTERNAL MEDICINE CLINIC | Facility: CLINIC | Age: 70
End: 2020-12-12

## 2020-12-18 ENCOUNTER — TELEPHONE (OUTPATIENT)
Dept: INTERNAL MEDICINE CLINIC | Facility: CLINIC | Age: 70
End: 2020-12-18

## 2020-12-21 ENCOUNTER — TELEMEDICINE (OUTPATIENT)
Dept: INTERNAL MEDICINE CLINIC | Facility: CLINIC | Age: 70
End: 2020-12-21
Payer: MEDICARE

## 2020-12-21 DIAGNOSIS — G62.9 NEUROPATHY: ICD-10-CM

## 2020-12-21 DIAGNOSIS — D50.9 IRON DEFICIENCY ANEMIA, UNSPECIFIED IRON DEFICIENCY ANEMIA TYPE: ICD-10-CM

## 2020-12-21 DIAGNOSIS — I48.91 ATRIAL FIBRILLATION, UNSPECIFIED TYPE (HCC): ICD-10-CM

## 2020-12-21 DIAGNOSIS — F41.8 ANXIETY ASSOCIATED WITH DEPRESSION: ICD-10-CM

## 2020-12-21 DIAGNOSIS — I10 HYPERTENSION, ESSENTIAL: ICD-10-CM

## 2020-12-21 DIAGNOSIS — M51.9 LUMBAR DISC DISEASE: ICD-10-CM

## 2020-12-21 DIAGNOSIS — M54.9 CHRONIC BILATERAL BACK PAIN, UNSPECIFIED BACK LOCATION: ICD-10-CM

## 2020-12-21 DIAGNOSIS — G89.29 CHRONIC BILATERAL BACK PAIN, UNSPECIFIED BACK LOCATION: ICD-10-CM

## 2020-12-21 DIAGNOSIS — N18.32 STAGE 3B CHRONIC KIDNEY DISEASE (HCC): Primary | ICD-10-CM

## 2020-12-21 DIAGNOSIS — M13.0 POLYARTHROPATHY: ICD-10-CM

## 2020-12-21 PROCEDURE — 99214 OFFICE O/P EST MOD 30 MIN: CPT | Performed by: PHYSICIAN ASSISTANT

## 2020-12-21 RX ORDER — GABAPENTIN 100 MG/1
100 CAPSULE ORAL 2 TIMES DAILY
COMMUNITY
End: 2021-01-19 | Stop reason: SDUPTHER

## 2021-01-19 DIAGNOSIS — G62.9 NEUROPATHY: Primary | ICD-10-CM

## 2021-01-19 RX ORDER — GABAPENTIN 100 MG/1
100 CAPSULE ORAL 2 TIMES DAILY
Qty: 180 CAPSULE | Refills: 3 | Status: SHIPPED | OUTPATIENT
Start: 2021-01-19 | End: 2021-04-05 | Stop reason: SDUPTHER

## 2021-01-27 ENCOUNTER — LAB (OUTPATIENT)
Dept: LAB | Facility: CLINIC | Age: 71
End: 2021-01-27
Payer: MEDICARE

## 2021-01-27 DIAGNOSIS — I10 HYPERTENSION, ESSENTIAL: ICD-10-CM

## 2021-01-27 DIAGNOSIS — D50.9 IRON DEFICIENCY ANEMIA, UNSPECIFIED IRON DEFICIENCY ANEMIA TYPE: ICD-10-CM

## 2021-01-27 DIAGNOSIS — N18.32 STAGE 3B CHRONIC KIDNEY DISEASE (HCC): ICD-10-CM

## 2021-01-27 LAB
ANION GAP SERPL CALCULATED.3IONS-SCNC: 5 MMOL/L (ref 4–13)
BASOPHILS # BLD AUTO: 0.08 THOUSANDS/ΜL (ref 0–0.1)
BASOPHILS NFR BLD AUTO: 1 % (ref 0–1)
BUN SERPL-MCNC: 20 MG/DL (ref 5–25)
CALCIUM SERPL-MCNC: 9.8 MG/DL (ref 8.3–10.1)
CHLORIDE SERPL-SCNC: 112 MMOL/L (ref 100–108)
CO2 SERPL-SCNC: 26 MMOL/L (ref 21–32)
CREAT SERPL-MCNC: 1.41 MG/DL (ref 0.6–1.3)
EOSINOPHIL # BLD AUTO: 2.03 THOUSAND/ΜL (ref 0–0.61)
EOSINOPHIL NFR BLD AUTO: 17 % (ref 0–6)
ERYTHROCYTE [DISTWIDTH] IN BLOOD BY AUTOMATED COUNT: 14.2 % (ref 11.6–15.1)
GFR SERPL CREATININE-BSD FRML MDRD: 50 ML/MIN/1.73SQ M
GLUCOSE P FAST SERPL-MCNC: 84 MG/DL (ref 65–99)
HCT VFR BLD AUTO: 38.7 % (ref 36.5–49.3)
HGB BLD-MCNC: 12.2 G/DL (ref 12–17)
IMM GRANULOCYTES # BLD AUTO: 0.05 THOUSAND/UL (ref 0–0.2)
IMM GRANULOCYTES NFR BLD AUTO: 0 % (ref 0–2)
LYMPHOCYTES # BLD AUTO: 3.09 THOUSANDS/ΜL (ref 0.6–4.47)
LYMPHOCYTES NFR BLD AUTO: 26 % (ref 14–44)
MCH RBC QN AUTO: 27.9 PG (ref 26.8–34.3)
MCHC RBC AUTO-ENTMCNC: 31.5 G/DL (ref 31.4–37.4)
MCV RBC AUTO: 89 FL (ref 82–98)
MONOCYTES # BLD AUTO: 1.12 THOUSAND/ΜL (ref 0.17–1.22)
MONOCYTES NFR BLD AUTO: 10 % (ref 4–12)
NEUTROPHILS # BLD AUTO: 5.37 THOUSANDS/ΜL (ref 1.85–7.62)
NEUTS SEG NFR BLD AUTO: 46 % (ref 43–75)
NRBC BLD AUTO-RTO: 0 /100 WBCS
PLATELET # BLD AUTO: 347 THOUSANDS/UL (ref 149–390)
PMV BLD AUTO: 10 FL (ref 8.9–12.7)
POTASSIUM SERPL-SCNC: 3.9 MMOL/L (ref 3.5–5.3)
RBC # BLD AUTO: 4.37 MILLION/UL (ref 3.88–5.62)
SODIUM SERPL-SCNC: 143 MMOL/L (ref 136–145)
WBC # BLD AUTO: 11.74 THOUSAND/UL (ref 4.31–10.16)

## 2021-01-27 PROCEDURE — 80048 BASIC METABOLIC PNL TOTAL CA: CPT

## 2021-01-27 PROCEDURE — 85025 COMPLETE CBC W/AUTO DIFF WBC: CPT

## 2021-01-27 PROCEDURE — 36415 COLL VENOUS BLD VENIPUNCTURE: CPT

## 2021-02-03 ENCOUNTER — TELEPHONE (OUTPATIENT)
Dept: INTERNAL MEDICINE CLINIC | Facility: CLINIC | Age: 71
End: 2021-02-03

## 2021-02-03 NOTE — TELEPHONE ENCOUNTER
[address] from [facility] called checking on the status of a discharge summary for skilled nursing  They faxed the order on 1/25/21 but will re-fax another 
FORWARDING MSG TO DR Dre Hodges
No

## 2021-02-08 ENCOUNTER — LAB (OUTPATIENT)
Dept: LAB | Facility: CLINIC | Age: 71
End: 2021-02-08
Payer: MEDICARE

## 2021-02-08 DIAGNOSIS — I10 HYPERTENSION, ESSENTIAL: ICD-10-CM

## 2021-02-08 DIAGNOSIS — N18.32 STAGE 3B CHRONIC KIDNEY DISEASE (HCC): ICD-10-CM

## 2021-02-08 DIAGNOSIS — D50.9 IRON DEFICIENCY ANEMIA, UNSPECIFIED IRON DEFICIENCY ANEMIA TYPE: ICD-10-CM

## 2021-02-08 LAB — HEMOCCULT STL QL IA: NEGATIVE

## 2021-02-08 PROCEDURE — G0328 FECAL BLOOD SCRN IMMUNOASSAY: HCPCS

## 2021-02-19 PROBLEM — M54.50 CHRONIC BILATERAL LOW BACK PAIN: Status: ACTIVE | Noted: 2021-02-19

## 2021-02-19 PROBLEM — G89.29 CHRONIC BILATERAL LOW BACK PAIN: Status: ACTIVE | Noted: 2021-02-19

## 2021-02-26 ENCOUNTER — OFFICE VISIT (OUTPATIENT)
Dept: NEUROSURGERY | Facility: CLINIC | Age: 71
End: 2021-02-26
Payer: MEDICARE

## 2021-02-26 VITALS
DIASTOLIC BLOOD PRESSURE: 89 MMHG | TEMPERATURE: 98.2 F | RESPIRATION RATE: 16 BRPM | BODY MASS INDEX: 31.34 KG/M2 | SYSTOLIC BLOOD PRESSURE: 164 MMHG | WEIGHT: 195 LBS | HEART RATE: 70 BPM | HEIGHT: 66 IN

## 2021-02-26 DIAGNOSIS — M51.9 LUMBAR DISC DISEASE: ICD-10-CM

## 2021-02-26 DIAGNOSIS — G62.9 NEUROPATHY: ICD-10-CM

## 2021-02-26 DIAGNOSIS — M54.50 CHRONIC BILATERAL LOW BACK PAIN, UNSPECIFIED WHETHER SCIATICA PRESENT: Primary | ICD-10-CM

## 2021-02-26 DIAGNOSIS — M54.9 CHRONIC BILATERAL BACK PAIN, UNSPECIFIED BACK LOCATION: ICD-10-CM

## 2021-02-26 DIAGNOSIS — G89.29 CHRONIC BILATERAL BACK PAIN, UNSPECIFIED BACK LOCATION: ICD-10-CM

## 2021-02-26 DIAGNOSIS — G89.29 CHRONIC BILATERAL LOW BACK PAIN, UNSPECIFIED WHETHER SCIATICA PRESENT: Primary | ICD-10-CM

## 2021-02-26 PROCEDURE — 99203 OFFICE O/P NEW LOW 30 MIN: CPT | Performed by: NEUROLOGICAL SURGERY

## 2021-02-26 NOTE — PROGRESS NOTES
Neurosurgery Office Note  Chalo De La Torre 79 y o  male MRN: [id number]      Assessment/Plan     Neuropathy  · Suspect component of peripheral neuropathy   · Unknown etiology at this time  · Can consider outpatient EMG for further evaluation  Chronic bilateral low back pain  Low back pain since 10/25/2020 without dermatomal radiculopathy  Imaging  · MRI Lumbar spine wo 10/27/2020: Marked prominence of the epidural fat throughout the lumbar spine with multilevel disc bulging  Minimal spinal stenosis without severe foraminal stenosis  Neural foramen are preserved  · MRI thoracic spine wo 10/27/2021: No severe central or foraminal stenosis at this time  Some degenerative changes noted  Plan  · Recommend on going pain control with OTC and prescribed medications as tolerated  · Patient has tried some therapy in the past without significant relief  · At this time, patient is without significant spinal pathology to explain patients symptoms  No surgical intervention recommended at this time  · Referral placed to pain management  · Discussed plan of care with patient who showed understanding and appreciation  · Patient made aware to contact neurosurgery with any questions or concerns  Diagnoses and all orders for this visit:    Chronic bilateral low back pain, unspecified whether sciatica present    Neuropathy  -     Ambulatory referral to Neurosurgery    Chronic bilateral back pain, unspecified back location  -     Ambulatory referral to Neurosurgery  -     Ambulatory referral to Pain Management; Future    Lumbar disc disease  -     Ambulatory referral to Neurosurgery    Other orders  -     Fluticasone Furoate-Vilanterol (BREO ELLIPTA IN);  Inhale daily            CHIEF COMPLAINT    Chief Complaint   Patient presents with    Consult     Lumbar spine evaluation       HISTORY    History of Present Illness     79y o  year old male     Patient is a 79year old male with PMHx of COPD, CHF, CAD, htn, hld, Lyme arthritis, Polyarthropathy and neuropathy  Patient has had back pain since October when he was reached a certain way to the side and has had back pain since that time  Patient states since that time and has been fairly constant ranging from a 7109/10 on the pain scale  His biggest complaint and concern is his pain associated with movement  He states he 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 but since this injury he is been able to be as active  He has taken  Some gabapentin as well as Percocet and muscle relaxer's the past without much improvement  He describes the pain as starting in his mid lower back radiating through his buttock into the back of his legs  No radiation to the anterior portion of his thigh or groin  He denies any radiculopathy past his knee  Of note patient also complained of separate numbness in pains that occur most often at night when he is lying flat in bed  It starts in his knees goes into his feet described as a tingling sensation  Discussed this is more likely his peripheral neuropathy  He has not  Formally completed physical therapy since his injury  He has not seen Pain Management or trialed injections  Reviewed imaging in room with patient and attending neurosurgeon  Informed patient that there is no surgical pathology this time that would allow improvement in patient's current symptoms state  Patient was pleased at this time  See Discussion    REVIEW OF SYSTEMS    Review of Systems   Constitutional: Negative  HENT: Negative  Eyes: Positive for pain (dry eyes, discomfort)  Respiratory: Positive for chest tightness and shortness of breath  Gastrointestinal: Negative  Endocrine: Positive for cold intolerance  Genitourinary: Negative  Musculoskeletal: Positive for back pain (and B/L legs R>L), gait problem (2/2 pain) and neck stiffness  Skin: Negative      Allergic/Immunologic: Positive for environmental allergies  Neurological: Positive for numbness (R>L legs)  Negative for weakness  Hematological: Negative  Psychiatric/Behavioral: Positive for sleep disturbance          Memory trouble post coma, improving         Meds/Allergies     Current Outpatient Medications   Medication Sig Dispense Refill    albuterol (PROVENTIL HFA,VENTOLIN HFA) 90 mcg/act inhaler Inhale 1 puff as needed       amiodarone 200 mg tablet Take 1 tablet (200 mg total) by mouth daily 90 tablet 3    amLODIPine (NORVASC) 2 5 mg tablet Take 1 tablet (2 5 mg total) by mouth daily 30 tablet 0    apixaban (ELIQUIS) 5 mg Take 1 tablet (5 mg total) by mouth 2 (two) times a day 60 tablet 11    Besivance 0 6 % SUSP Administer to both eyes 2 (two) times a day       cyclobenzaprine (FLEXERIL) 5 mg tablet Take 5 mg by mouth 3 (three) times a day as needed       fluticasone (FLONASE) 50 mcg/act nasal spray       Fluticasone Furoate-Vilanterol (BREO ELLIPTA IN) Inhale daily      gabapentin (NEURONTIN) 100 mg capsule Take 1 capsule (100 mg total) by mouth 2 (two) times a day 180 capsule 3    Levothyroxine Sodium 75 MCG CAPS Take 75 mcg by mouth daily      lisinopril (ZESTRIL) 10 mg tablet Take 1 tablet (10 mg total) by mouth daily 90 tablet 3    metoprolol tartrate (LOPRESSOR) 50 mg tablet Take 1 tablet (50 mg total) by mouth 2 (two) times a day 180 tablet 3    montelukast (SINGULAIR) 10 mg tablet Take 10 mg by mouth daily       nitroglycerin (NITROSTAT) 0 4 mg SL tablet Place 1 tablet under the tongue      oxyCODONE-acetaminophen (PERCOCET) 5-325 mg per tablet Take 1 tablet by mouth every 4 (four) hours as needed for severe pain for up to 7 dosesMax Daily Amount: 6 tablets 7 tablet 0    predniSONE 10 mg tablet Take 40 milligrams for 2 days, 30 milligrams for 2 days, 20 milligrams for 2 days, 10 milligrams for 2 days, then stop 20 tablet 0    rOPINIRole (REQUIP) 0 5 mg tablet Take 1 tablet (0 5 mg total) by mouth daily at bedtime 90 tablet 3    tamsulosin (FLOMAX) 0 4 mg TAKE 1 CAPSULE BY MOUTH NIGHTLY      furosemide (LASIX) 20 mg tablet Take 1 tablet by mouth daily      prednisoLONE acetate (PRED FORTE) 1 % ophthalmic suspension Administer 1 drop to both eyes 2 (two) times a day       Prolensa 0 07 % SOLN Administer to both eyes 2 (two) times a day       senna (SENOKOT) 8 6 MG tablet Take 1 tablet by mouth daily      TRELEGY ELLIPTA 100-62 5-25 MCG/INH inhaler Inhale 1 puff daily       No current facility-administered medications for this visit  Allergies   Allergen Reactions    Pollen Extract Itching and Sneezing       PAST HISTORY    Past Medical History:   Diagnosis Date    Cancer ([facility] [address] )     Lung Cancer    Hyperlipidemia     Nonischemic cardiomyopathy ([facility] [address] )     last assessed 17; resolved 16       Past Surgical History:   Procedure Laterality Date    CARDIAC CATHETERIZATION      his ablation    CATARACT EXTRACTION, BILATERAL Bilateral 10/01/2020    LIPOMA RESECTION      LUNG CANCER SURGERY      ROTATOR CUFF REPAIR         Social History     Tobacco Use    Smoking status: Former Smoker     Packs/day:  00     Years: 20      Pack years: 20      Quit date: 1998     Years since quittin 2    Smokeless tobacco: Never Used   Substance Use Topics    Alcohol use: Yes     Alcohol/week: 6 0 standard drinks     Types: 6 Glasses of wine per week     Comment: with dinner    Drug use: No       Family History   Adopted: Yes   Problem Relation Age of Onset    No Known Problems Mother     No Known Problems Father          Above history personally reviewed  EXAM    Vitals:Blood pressure 164/89, pulse 70, temperature 98 2 °F (36 8 °C), temperature source Tympanic, resp  rate 16, height 5' 6" (1 676 m), weight 88 5 kg (195 lb)  ,Body mass index is 31 47 kg/m²  Physical Exam  Constitutional:       Appearance: Normal appearance  He is well-developed     HENT:      Head: Normocephalic and atraumatic  Eyes:      Extraocular Movements: Extraocular movements intact and EOM normal       Conjunctiva/sclera: Conjunctivae normal       Pupils: Pupils are equal, round, and reactive to light  Neck:      Musculoskeletal: Normal range of motion and neck supple  Vascular: No JVD  Cardiovascular:      Rate and Rhythm: Normal rate  Pulmonary:      Effort: Pulmonary effort is normal    Abdominal:      General: There is no distension  Palpations: Abdomen is soft  Tenderness: There is no abdominal tenderness  Musculoskeletal: Normal range of motion  General: Tenderness (midline lumbar and medial gluteal pain to ppalpation  ) present  No deformity  Skin:     General: Skin is warm and dry  Neurological:      Mental Status: He is alert and oriented to person, place, and time  Cranial Nerves: No cranial nerve deficit  Sensory: Sensory deficit present  Motor: Weakness (pain limiting) present  Gait: Gait is intact  Deep Tendon Reflexes: Reflexes are normal and symmetric  Reflex Scores:       Bicep reflexes are 2+ on the right side and 2+ on the left side  Brachioradialis reflexes are 2+ on the right side and 2+ on the left side  Patellar reflexes are 2+ on the right side and 2+ on the left side  Psychiatric:         Speech: Speech normal          Behavior: Behavior normal          Thought Content: Thought content normal          Neurologic Exam     Mental Status   Oriented to person, place, and time  Attention: normal    Speech: speech is normal   Level of consciousness: alert  Knowledge: good  Normal comprehension  Cranial Nerves     CN III, IV, VI   Pupils are equal, round, and reactive to light  Extraocular motions are normal    Upgaze: normal  Downgaze: normal    CN VII   Facial expression full, symmetric       CN VIII   CN VIII normal    Hearing: intact    Motor Exam   Muscle bulk: normal  Right arm tone: normal  Left arm tone: normal  Right leg tone: normal  Left leg tone: normal    Strength   Right deltoid: 5/5  Left deltoid: 5/5  Right biceps: 5/5  Left biceps: 5/5  Right triceps: 5/5  Left triceps: 5/5  Right wrist flexion: 5/5  Left wrist flexion: 5/5  Right wrist extension: 5/5  Left wrist extension: 5/5  Right anterior tibial: 5/5  Left anterior tibial: 5/5  Right gastroc: 5/5  Left gastroc: 5/5  4-/5 Hip flexion bilaterally  4+/ 5 knee flexion/ knee extension bilaterally  5/5 plantar flexion/ dorsiflexion  Proximal weakness related to pain inhibition  Sensory Exam   Light touch normal      Gait, Coordination, and Reflexes     Gait  Gait: normal    Tremor   Resting tremor: absent  Action tremor: absent    Reflexes   Right brachioradialis: 2+  Left brachioradialis: 2+  Right biceps: 2+  Left biceps: 2+  Right patellar: 2+  Left patellar: 2+  Right Lindsay: absent  Left Lindsay: absent  Right ankle clonus: absent  Left ankle clonus: absent        MEDICAL DECISION MAKING    Imaging Studies:     No results found  I have personally reviewed pertinent reports     and I have personally reviewed pertinent films in PACS

## 2021-02-26 NOTE — ASSESSMENT & PLAN NOTE
Low back pain since 10/25/2020 without dermatomal radiculopathy  Imaging  · MRI Lumbar spine wo 10/27/2020: Marked prominence of the epidural fat throughout the lumbar spine with multilevel disc bulging  Minimal spinal stenosis without severe foraminal stenosis  Neural foramen are preserved  · MRI thoracic spine wo 10/27/2021: No severe central or foraminal stenosis at this time  Some degenerative changes noted  Plan  · Recommend on going pain control with OTC and prescribed medications as tolerated  · Patient has tried some therapy in the past without significant relief  · At this time, patient is without significant spinal pathology to explain patients symptoms  No surgical intervention recommended at this time  · Referral placed to pain management  · Discussed plan of care with patient who showed understanding and appreciation  · Patient made aware to contact neurosurgery with any questions or concerns

## 2021-02-26 NOTE — PATIENT INSTRUCTIONS
Back Pain   WHAT YOU NEED TO KNOW:   Back pain is common  It can be caused by many conditions, such as arthritis or the breakdown of spinal discs  Your risk for back pain is increased by injuries, lack of activity, or repeated bending and twisting  You may feel sore or stiff on one or both sides of your back  The pain may spread to your buttocks or thighs  DISCHARGE INSTRUCTIONS:   Return to the emergency department if:   · You have pain, numbness, or weakness in one or both legs  · Your pain becomes so severe that you cannot walk  · You cannot control your urine or bowel movements  · You have severe back pain with chest pain  · You have severe back pain, nausea, and vomiting  · You have severe back pain that spreads to your side or genital area  Contact your healthcare provider if:   · You have back pain that does not get better with rest and pain medicine  · You have a fever  · You have pain that worsens when you are on your back or when you rest     · You have pain that worsens when you cough or sneeze  · You lose weight without trying  · You have questions or concerns about your condition or care  Medicines:   · NSAIDs  help decrease swelling and pain  This medicine is available with or without a doctor's order  NSAIDs can cause stomach bleeding or kidney problems in certain people  If you take blood thinner medicine, always ask your healthcare provider if NSAIDs are safe for you  Always read the medicine label and follow directions  · Acetaminophen  decreases pain and fever  It is available without a doctor's order  Ask how much to take and how often to take it  Follow directions  Read the labels of all other medicines you are using to see if they also contain acetaminophen, or ask your doctor or pharmacist  Acetaminophen can cause liver damage if not taken correctly  Do not use more than 4 grams (4,000 milligrams) total of acetaminophen in one day       · Muscle relaxers help decrease muscle spasms and back pain  · Prescription pain medicine  may be given  Ask your healthcare provider how to take this medicine safely  Some prescription pain medicines contain acetaminophen  Do not take other medicines that contain acetaminophen without talking to your healthcare provider  Too much acetaminophen may cause liver damage  Prescription pain medicine may cause constipation  Ask your healthcare provider how to prevent or treat constipation  · Take your medicine as directed  Contact your healthcare provider if you think your medicine is not helping or if you have side effects  Tell him or her if you are allergic to any medicine  Keep a list of the medicines, vitamins, and herbs you take  Include the amounts, and when and why you take them  Bring the list or the pill bottles to follow-up visits  Carry your medicine list with you in case of an emergency  How to manage your back pain:   · Apply ice  on your back for 15 to 20 minutes every hour or as directed  Use an ice pack, or put crushed ice in a plastic bag  Cover it with a towel before you apply it to your skin  Ice helps prevent tissue damage and decreases pain  · Apply heat  on your back for 20 to 30 minutes every 2 hours for as many days as directed  Heat helps decrease pain and muscle spasms  · Stay active  as much as you can without causing more pain  Bed rest could make your back pain worse  Avoid heavy lifting until your pain is gone  · Go to physical therapy as directed  A physical therapist can teach you exercises to help improve movement and strength, and to decrease pain  Follow up with your healthcare provider in 2 weeks, or as directed:  Write down your questions so you remember to ask them during your visits  © Copyright [address] Information is for End User's use only and may not be sold, redistributed or otherwise used for commercial purposes   All illustrations and images included in CareNotes® are the copyrighted property of [employer]  or Konstantin Roque   The above information is an  only  It is not intended as medical advice for individual conditions or treatments  Talk to your doctor, nurse or pharmacist before following any medical regimen to see if it is safe and effective for you

## 2021-02-26 NOTE — ASSESSMENT & PLAN NOTE
· Suspect component of peripheral neuropathy   · Unknown etiology at this time  · Can consider outpatient EMG for further evaluation

## 2021-03-15 ENCOUNTER — CONSULT (OUTPATIENT)
Dept: PAIN MEDICINE | Facility: CLINIC | Age: 71
End: 2021-03-15
Payer: MEDICARE

## 2021-03-15 VITALS
DIASTOLIC BLOOD PRESSURE: 81 MMHG | RESPIRATION RATE: 16 BRPM | HEIGHT: 66 IN | HEART RATE: 81 BPM | WEIGHT: 199 LBS | BODY MASS INDEX: 31.98 KG/M2 | SYSTOLIC BLOOD PRESSURE: 150 MMHG

## 2021-03-15 DIAGNOSIS — G89.29 CHRONIC BILATERAL LOW BACK PAIN WITH BILATERAL SCIATICA: ICD-10-CM

## 2021-03-15 DIAGNOSIS — M54.41 CHRONIC BILATERAL LOW BACK PAIN WITH BILATERAL SCIATICA: ICD-10-CM

## 2021-03-15 DIAGNOSIS — M54.42 CHRONIC BILATERAL LOW BACK PAIN WITH BILATERAL SCIATICA: ICD-10-CM

## 2021-03-15 DIAGNOSIS — M47.816 FACET ARTHROPATHY, LUMBAR: ICD-10-CM

## 2021-03-15 DIAGNOSIS — M48.061 NEURAL FORAMINAL STENOSIS OF LUMBAR SPINE: Primary | ICD-10-CM

## 2021-03-15 PROCEDURE — 99204 OFFICE O/P NEW MOD 45 MIN: CPT | Performed by: STUDENT IN AN ORGANIZED HEALTH CARE EDUCATION/TRAINING PROGRAM

## 2021-03-15 NOTE — H&P (VIEW-ONLY)
Assessment  1  Neural foraminal stenosis of lumbar spine    2  Chronic bilateral low back pain with bilateral sciatica    3  Facet arthropathy, lumbar        Plan     this is a 80-year-old male who presents to our office with a chief complaint of bilateral low back pain and bilateral lower extremity radiculopathy in what appears to be in L5 dermatomal distribution  He has significant pain over the ankles and decreased sensation to light touch over the big toes with weakness and big toe dorsiflexion bilaterally  This is in the setting of severe neural foraminal stenosis at the L5-S1 level bilaterally  He also appears to have an axial component to his low back pain with significant pain with facet loading  Has been prescribed Percocet which she is not using due to intolerance inefficacy  Was prescribed gabapentin 100 mg b i d  by his PCP discussed that he could potentially increase his medication to 200 mg b i d  Given the patient's symptoms, examination findings and imaging results noted above, I discussed the utility of proceeding with a L5-S1 interlaminar epidural steroid injection under fluoroscopic guidance  Using an anatomical model, the procedure, as well as its potential risks, benefits, and reasonable alternatives were discussed in detail  Discussed risks of the procedure included but are not limited to bleeding, infection, allergic reaction, nerve damage, hematoma fomation, abscess formation, failure of the pain to improve and potential worsening of the pain  Since Mr Imani Lopez has failed at least 6 weeks of conservative measures including over-the-counter pain medications, prescription medications and physical therapy it is reasonable to proceed with the above injection  The patient verbalized understanding to the potential risks, benefits, and reasonable alternatives to the above injection and wishes to proceed  His response will help to further determine a treatment plan        We will reassess him after injection  May be candidate for repeat epidural based on presentation of symptoms at that time specially in setting of foraminal stenosis at higher levels  Additionally may be candidate for MBB / RFA given notable pain with facet loading bilaterally  [facility] Prescription Drug Monitoring Program report was reviewed and was appropriate     My impressions and treatment recommendations were discussed in detail with the patient who verbalized understanding and had no further questions  Discharge instructions were provided  I personally saw and examined the patient and I agree with the above discussed plan of care  Orders Placed This Encounter   Procedures    FL spine and pain procedure     Standing Status:   Future     Standing Expiration Date:   3/15/2025     Order Specific Question:   Reason for Exam:     Answer:   L5-S1 LESI     Order Specific Question:   Anticoagulant hold needed? Answer:   Eliquis     No orders of the defined types were placed in this encounter  History of Present Illness    Referring Provider: Ami Bautista PA-C    Alda Cruz is a 79 y o  male who presents with a chief complaint of bilateral low back pain with b/l LE radiculopathy, R>L  Pain starts in low back and radiates down the leg into the soles of the feet  Complains of foot numbness bilaterally as well  Back and leg pain are both   Equally bad  Also complains of pain in the ankles at night  Pain started 7 months ago  Reports it began following a hospitalization for sepsis  Has history of lobectomy and pneumonia  Over the past month, intensifty of pain is severe  Pain is 7/10 and constant  During past month, most notable in evening and night  Describes pain as burning, cramping, shooting, numbness, sharp, pins and needles, throbbing, and dull/aching  Pain is increased with all activities, but especially notable with back extension and twisting        MRI of the lumbar spine is notable for severe foraminal narrowing at the bilateral  L5-S1 level  As well as notable foraminal narrowing at L3-4 and L4-5  Does not appear to have significant central canal stenosis  Past surgical history includes right lung lobectomy, left rotator cuff surgery  Past medical history includes asthma, chest pain, CAD, hypertension, kidney disease (Cr>1 4),  Thyroid disease, and lung cancer  He has had moderate relief with PT and exercise  Does not smoke tobacco or marijuana  Drinks alcohol occasionally  He is not allergic to latex  Was prescribed Percocet recently by PCP but does not take it due to stomach irritation and inefficacy  In the past he has used lidocaine patch and Voltaren gel as well as Tylenol with minimal relief  Also has tried Lyrica in the past with minimal relief  Currently takes gabapentin 100 mg b i d  with minimal relief  I have personally reviewed and/or updated the patient's past medical history, past surgical history, family history, social history, current medications, allergies, and vital signs today  Review of Systems   Constitutional: Negative for fever and unexpected weight change  HENT: Negative for trouble swallowing  Eyes: Positive for pain, redness and visual disturbance  Respiratory: Positive for cough, shortness of breath and wheezing  Cardiovascular: Positive for chest pain  Negative for palpitations  Gastrointestinal: Negative for constipation, diarrhea, nausea and vomiting  Endocrine: Positive for polyuria  Negative for cold intolerance, heat intolerance and polydipsia  Genitourinary: Negative for difficulty urinating and frequency  Musculoskeletal: Positive for arthralgias and myalgias  Negative for gait problem and joint swelling  Skin: Negative for rash  Neurological: Positive for dizziness, numbness and headaches  Negative for seizures, syncope and weakness  Hematological: Does not bruise/bleed easily  Psychiatric/Behavioral: Negative for dysphoric mood  The patient is nervous/anxious  All other systems reviewed and are negative  Patient Active Problem List   Diagnosis    Anxiety associated with depression    Chronic obstructive pulmonary disease (HCC)    Congestive heart failure (HCC)    Coronary artery disease    High cholesterol    Hypertension, essential    Insomnia    Lyme borreliosis    Polyarthropathy    Neuropathy    Myopathy    Inflammatory arthropathy    Health care maintenance    Prostate cancer screening    Colon cancer screening    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[facility] )    Malignant neoplasm of lower lobe of right lung (HCC)    Chronic bilateral low back pain       Past Medical History:   Diagnosis Date    Cancer ([facility] )     Lung Cancer    Hyperlipidemia     Nonischemic cardiomyopathy ([facility] )     last assessed 17; resolved 16       Past Surgical History:   Procedure Laterality Date    CARDIAC CATHETERIZATION      his ablation    CATARACT EXTRACTION, BILATERAL Bilateral 10/01/2020    LIPOMA RESECTION      LUNG CANCER SURGERY      ROTATOR CUFF REPAIR         Family History   Adopted: Yes   Problem Relation Age of Onset    No Known Problems Mother     No Known Problems Father        Social History     Occupational History    Occupation: retired   Tobacco Use    Smoking status: Former Smoker     Packs/day: 1 00     Years: 20 00     Pack years: 20 00     Quit date: 1998     Years since quittin 2    Smokeless tobacco: Never Used   Substance and Sexual Activity    Alcohol use:  Yes     Alcohol/week: 6 0 standard drinks     Types: 6 Glasses of wine per week     Comment: with dinner    Drug use: No    Sexual activity: Yes     Partners: Male       Current Outpatient Medications on File Prior to Visit   Medication Sig    albuterol (PROVENTIL HFA,VENTOLIN HFA) 90 mcg/act inhaler Inhale 1 puff as needed     amiodarone 200 mg tablet Take 1 tablet (200 mg total) by mouth daily    amLODIPine (NORVASC) 2 5 mg tablet Take 1 tablet (2 5 mg total) by mouth daily    apixaban (ELIQUIS) 5 mg Take 1 tablet (5 mg total) by mouth 2 (two) times a day    Besivance 0 6 % SUSP Administer to both eyes 2 (two) times a day     cyclobenzaprine (FLEXERIL) 5 mg tablet Take 5 mg by mouth 3 (three) times a day as needed     fluticasone (FLONASE) 50 mcg/act nasal spray     Fluticasone Furoate-Vilanterol (BREO ELLIPTA IN) Inhale daily    furosemide (LASIX) 20 mg tablet Take 1 tablet by mouth daily    gabapentin (NEURONTIN) 100 mg capsule Take 1 capsule (100 mg total) by mouth 2 (two) times a day    Levothyroxine Sodium 75 MCG CAPS Take 75 mcg by mouth daily    lisinopril (ZESTRIL) 10 mg tablet Take 1 tablet (10 mg total) by mouth daily    metoprolol tartrate (LOPRESSOR) 50 mg tablet Take 1 tablet (50 mg total) by mouth 2 (two) times a day    montelukast (SINGULAIR) 10 mg tablet Take 10 mg by mouth daily     nitroglycerin (NITROSTAT) 0 4 mg SL tablet Place 1 tablet under the tongue    oxyCODONE-acetaminophen (PERCOCET) 5-325 mg per tablet Take 1 tablet by mouth every 4 (four) hours as needed for severe pain for up to 7 dosesMax Daily Amount: 6 tablets    prednisoLONE acetate (PRED FORTE) 1 % ophthalmic suspension Administer 1 drop to both eyes 2 (two) times a day     predniSONE 10 mg tablet Take 40 milligrams for 2 days, 30 milligrams for 2 days, 20 milligrams for 2 days, 10 milligrams for 2 days, then stop    Prolensa 0 07 % SOLN Administer to both eyes 2 (two) times a day     rOPINIRole (REQUIP) 0 5 mg tablet Take 1 tablet (0 5 mg total) by mouth daily at bedtime    senna (SENOKOT) 8 6 MG tablet Take 1 tablet by mouth daily    tamsulosin (FLOMAX) 0 4 mg TAKE 1 CAPSULE BY MOUTH NIGHTLY    TRELEGY ELLIPTA 100-62 5-25 MCG/INH inhaler Inhale 1 puff daily     No current facility-administered medications on file prior to visit          Allergies   Allergen Reactions    Pollen Extract Itching and Sneezing       Physical Exam    /81   Pulse 81   Resp 16   Ht 5' 6" (1 676 m)   Wt 90 3 kg (199 lb)   BMI 32 12 kg/m²     Constitutional: normal, well developed, well nourished, alert, in no distress and non-toxic and no overt pain behavior  Eyes: anicteric  HEENT: grossly intact  Neck: supple, symmetric, trachea midline and no masses   Pulmonary:even and unlabored  Cardiovascular:No edema or pitting edema present  Skin:Normal without rashes or lesions and well hydrated  Psychiatric:Mood and affect appropriate  Neurologic:Cranial Nerves II-XII grossly intact  Musculoskeletal:normal     Lumbar Spine Exam    Appearance:  Normal lordosis  Palpation/Tenderness:  left lumbar paraspinal tenderness  right lumbar paraspinal tenderness  left sacroiliac joint tenderness  right sacroiliac joint tenderness  Sensory:  no sensory deficits noted  Range of Motion:  Flexion:   Moderately limited  with pain  Extension:  Moderately limited  with pain  Lateral Flexion - Left:  Moderately limited  with pain  Lateral Flexion - Right:  Moderately limited  with pain  Rotation - Left:  Moderately limited  with pain  Rotation - Right:  Moderately limited  with pain  Motor Strength:  Left hip flexion:  5/5  Left hip extension:  5/5  Right hip flexion:  4/5  Right hip extension:  5/5  Left knee flexion:  5/5  Left knee extension:  5/5  Right knee flexion:  5/5  Right knee extension:  5/5  Left foot dorsiflexion:  4/5  Left foot plantar flexion:  5/5  Right foot dorsiflexion:  4/5  Right foot plantar flexion:  4/5  Reflexes:  Left Patellar:  3+   Right Patellar:  3+   Left Achilles:  2+   Right Achilles:  3+   Special Tests:  Left Straight Leg Test:  positive  Right Straight Leg Test:  positive  Left Bret's Maneuver:  positive  Right Bret's Maneuver:  positive      DIAGNOSTIC IMAGING AND TEST RESULTS:  Interface, Rad Results In - 10/27/2020  3:27 PM EDT  MRI thoracic spine unenhanced    HISTORY: Mid back pain     COMPARISON: CT thoracic spine 10/26/2020    TECHNIQUE: MRI of the thoracic spine was performed in the sagittal and axial  planes utilizing short and long TE sequences without intravenous contrast  material     Report: The axial T2-weighted sequences are motion degraded  There is normal thoracic spine alignment and kyphosis  Vertebral body height and  marrow signal intensity are within normal limits  There is generalized  interspace desiccation with height loss  There are some scattered a disc bulges  at multiple levels without any moderate or severe spinal stenosis  Disc bulges  are most prominent at T8-9 and T10-11  There may be marginal spinal canal  narrowing at T8-9 and mostly due to prominence of the epidural fat  The disc  bulge at the T10-11 level there is left paracentral in configuration without any  spinal stenosis  Additionally, there is moderate to severe bilateral foraminal  narrowing at T10-11  Thoracic cord signal is grossly within normal limits  There is motion artifact  There are atelectatic changes at the lung bases  The size atelectatic changes,  there appears to be bilateral pneumonia, much worse on the right  Correlation  with patient's symptoms recommended  IMPRESSION:  IMPRESSION:    No acute cord compression  No gross cord edema  Marginal spinal stenosis at  T8-9  Moderate to severe bilateral foraminal stenosis at T10-11  Extensive right greater than left at pneumonia with bibasilar atelectasis  Interface, Rad Results In - 10/27/2020  2:26 PM EDT  MRI lumbar spine unenhanced    HISTORY: Back pain and radiculopathy  COMPARISON: CT lumbar spine examination 10/26/2020 demonstrating lumbar spinal  stenosis and bony foraminal narrowing  No acute fracture  Extensive moderate  right lower lobe pneumonia      TECHNIQUE: MRI of the lumbar spine was performed in the sagittal and axial  planes utilizing short and long TE sequences without intravenous contrast  material     Report:    Demonstrated is a grade 1 retrolisthesis of L1 on L2 and of L2 on L3  There is a  grade 1 retrolisthesis of L3 on L4  There is a grade 1 retrolisthesis of L4 on  L5  Vertebral body height and marrow signal intensity are within normal limits  There is generalized interspace desiccation with height loss  This is worse at  L5-S1  Conus medullaris terminates at L1-2  At T12-L1, there is no spinal or foraminal stenosis  At L1-2, there is minor disc bulging  There is no spinal stenosis  There is no  appreciable foraminal narrowing  At L2-3, there is disc bulging with prominence of the epidural fat  There is no  spinal stenosis  There is bilateral foraminal bulging resulting in narrowing of  the subarticular recesses and overall minimal foraminal narrowing at this level  At L3-4, there is very minimal uncovering of the disc  There is prominence the  epidural fat  There is no spinal stenosis  There is bilateral foraminal bulging  resulting in moderate to severe right and minimal approaching moderate left  foraminal narrowing  At L4-5, there is prominence of the epidural fat  This results in very minimal  spinal stenosis  There is minimal uncovering of the disc  There is a diffuse  posterior disc bulge  Bilateral foraminal bulging is noted  This results in  moderate to severe left and moderate to severe right foraminal stenosis in  conjunction with facet hypertrophy  At L5-S1, there is minimal spinal stenosis due to prominence of the epidural  fat  There is severe bilateral foraminal stenosis with impingement of the  exiting bilateral L5 nerve roots due to a combination of endplate osteophytes,  facet hypertrophy, and disc bulging  Paraspinal soft tissues are unremarkable  IMPRESSION:  IMPRESSION:    Marked prominence of the epidural fat throughout the lumbar spine with  multilevel disc bulging  Despite this, minimal spinal stenosis is only seen at  L4-5 and L5-S1  Foraminal narrowing is most severe at L5-S1 bilaterally with  impingement of the exiting bilateral L5 nerve roots  All these findings are  chronic

## 2021-03-15 NOTE — PATIENT INSTRUCTIONS
Epidural Steroid Injection   AMBULATORY CARE:   What you need to know about an epidural steroid injection (BIANCA):  An BIANCA is a procedure to inject steroid medicine into the epidural space  The epidural space is between your spinal cord and vertebrae  Steroids reduce inflammation and fluid buildup in your spine that may be causing pain  You may be given pain medicine along with the steroids  How to prepare for an BIANCA:  Your healthcare provider will talk to you about how to prepare for your procedure  He or she will tell you what medicines to take or not take on the day of your procedure  You may need to stop taking blood thinners or other medicines several days before your procedure  You may need to adjust any diabetes medicine you take on the day of your procedure  Steroid medicine can increase your blood sugar level  Arrange for someone to drive you home when you are discharged  What will happen during an BIANCA:   · You will be given medicine to numb the procedure area  You will be awake for the procedure, but you will not feel pain  You may also be given medicine to help you relax  Contrast liquid will be used to help your healthcare provider see the area better  Tell the healthcare provider if you have ever had an allergic reaction to contrast liquid  · Your healthcare provider may place the needle into your neck area, middle of your back, or tailbone area  He may inject the medicine next to the nerves that are causing your pain  He may instead inject the medicine into a larger area of the epidural space  This helps the medicine spread to more nerves  Your healthcare provider will use a fluoroscope to help guide the needle to the right place  A fluoroscope is a type of x-ray  After the procedure, a bandage will be placed over the injection site to prevent infection  What will happen after an BIANCA:  You will have a bandage over the injection site to prevent infection   Your healthcare provider will tell you when you can bathe and any activity guidelines  You will be able to go home  Risks of an BIANCA:  You may have temporary or permanent nerve damage or paralysis  You may have bleeding or develop a serious infection, such as meningitis (swelling of the brain coverings)  An abscess may also develop  An abscess is a pus-filled area under the skin  You may need surgery to fix the abscess  You may have a seizure, anxiety, or trouble sleeping  If you are a man, you may have temporary erectile dysfunction (not able to have an erection)  Call your local emergency number (911 in the [address] [place] [address],[address]) if:   · You have a seizure  · You have trouble moving your legs  Seek care immediately if:   · Blood soaks through your bandage  · You have a fever or chills, severe back pain, and the procedure area is sensitive to the touch  · You cannot control when you urinate or have a bowel movement  Call your doctor if:   · You have weakness or numbness in your legs  · Your wound is red, swollen, or draining pus  · You have nausea or are vomiting  · Your face or neck is red and you feel warm  · You have more pain than you had before the procedure  · You have swelling in your hands or feet  · You have questions or concerns about your condition or care  Care for your wound as directed: You may remove the bandage before you go to bed the day of your procedure  You may take a shower, but do not take a bath for at least 24 hours  Self-care:   · Do not drive,  use machines, or do strenuous activity for 24 hours after your procedure or as directed  · Continue other treatments  as directed  Steroid injections alone will not control your pain  The injections are meant to be used with other treatments, such as physical therapy  Follow up with your doctor as directed:  Write down your questions so you remember to ask them during your visits     © Copyright [employer] 2020 Information is for End User's use only and may not be sold, redistributed or otherwise used for commercial purposes  All illustrations and images included in CareNotes® are the copyrighted property of [employer]  or Konstantin Roldan  The above information is an  only  It is not intended as medical advice for individual conditions or treatments  Talk to your doctor, nurse or pharmacist before following any medical regimen to see if it is safe and effective for you

## 2021-03-15 NOTE — PROGRESS NOTES
Assessment  1  Neural foraminal stenosis of lumbar spine    2  Chronic bilateral low back pain with bilateral sciatica    3  Facet arthropathy, lumbar        Plan     this is a 66-year-old male who presents to our office with a chief complaint of bilateral low back pain and bilateral lower extremity radiculopathy in what appears to be in L5 dermatomal distribution  He has significant pain over the ankles and decreased sensation to light touch over the big toes with weakness and big toe dorsiflexion bilaterally  This is in the setting of severe neural foraminal stenosis at the L5-S1 level bilaterally  He also appears to have an axial component to his low back pain with significant pain with facet loading  Has been prescribed Percocet which she is not using due to intolerance inefficacy  Was prescribed gabapentin 100 mg b i d  by his PCP discussed that he could potentially increase his medication to 200 mg b i d  Given the patient's symptoms, examination findings and imaging results noted above, I discussed the utility of proceeding with a L5-S1 interlaminar epidural steroid injection under fluoroscopic guidance  Using an anatomical model, the procedure, as well as its potential risks, benefits, and reasonable alternatives were discussed in detail  Discussed risks of the procedure included but are not limited to bleeding, infection, allergic reaction, nerve damage, hematoma fomation, abscess formation, failure of the pain to improve and potential worsening of the pain  Since Mr Martin Peña has failed at least 6 weeks of conservative measures including over-the-counter pain medications, prescription medications and physical therapy it is reasonable to proceed with the above injection  The patient verbalized understanding to the potential risks, benefits, and reasonable alternatives to the above injection and wishes to proceed  His response will help to further determine a treatment plan        We will reassess him after injection  May be candidate for repeat epidural based on presentation of symptoms at that time specially in setting of foraminal stenosis at higher levels  Additionally may be candidate for MBB / RFA given notable pain with facet loading bilaterally  [place] Prescription Drug Monitoring Program report was reviewed and was appropriate     My impressions and treatment recommendations were discussed in detail with the patient who verbalized understanding and had no further questions  Discharge instructions were provided  I personally saw and examined the patient and I agree with the above discussed plan of care  Orders Placed This Encounter   Procedures    [place] spine and pain procedure     Standing Status:   Future     Standing Expiration Date:   3/15/2025     Order Specific Question:   Reason for Exam:     Answer:   L5-S1 LESI     Order Specific Question:   Anticoagulant hold needed? Answer:   Eliquis     No orders of the defined types were placed in this encounter  History of Present Illness    Referring Provider: Elaina Charles PA-C    Dilan Lopez is a 79 y o  male who presents with a chief complaint of bilateral low back pain with b/l LE radiculopathy, R>L  Pain starts in low back and radiates down the leg into the soles of the feet  Complains of foot numbness bilaterally as well  Back and leg pain are both   Equally bad  Also complains of pain in the ankles at night  Pain started 7 months ago  Reports it began following a hospitalization for sepsis  Has history of lobectomy and pneumonia  Over the past month, intensifty of pain is severe  Pain is 7/10 and constant  During past month, most notable in evening and night  Describes pain as burning, cramping, shooting, numbness, sharp, pins and needles, throbbing, and dull/aching  Pain is increased with all activities, but especially notable with back extension and twisting        MRI of the lumbar spine is notable for severe foraminal narrowing at the bilateral  L5-S1 level  As well as notable foraminal narrowing at L3-4 and L4-5  Does not appear to have significant central canal stenosis  Past surgical history includes right lung lobectomy, left rotator cuff surgery  Past medical history includes asthma, chest pain, CAD, hypertension, kidney disease (Cr>1 4),  Thyroid disease, and lung cancer  He has had moderate relief with PT and exercise  Does not smoke tobacco or marijuana  Drinks alcohol occasionally  He is not allergic to latex  Was prescribed Percocet recently by PCP but does not take it due to stomach irritation and inefficacy  In the past he has used lidocaine patch and Voltaren gel as well as Tylenol with minimal relief  Also has tried Lyrica in the past with minimal relief  Currently takes gabapentin 100 mg b i d  with minimal relief  I have personally reviewed and/or updated the patient's past medical history, past surgical history, family history, social history, current medications, allergies, and vital signs today  Review of Systems   Constitutional: Negative for fever and unexpected weight change  HENT: Negative for trouble swallowing  Eyes: Positive for pain, redness and visual disturbance  Respiratory: Positive for cough, shortness of breath and wheezing  Cardiovascular: Positive for chest pain  Negative for palpitations  Gastrointestinal: Negative for constipation, diarrhea, nausea and vomiting  Endocrine: Positive for polyuria  Negative for cold intolerance, heat intolerance and polydipsia  Genitourinary: Negative for difficulty urinating and frequency  Musculoskeletal: Positive for arthralgias and myalgias  Negative for gait problem and joint swelling  Skin: Negative for rash  Neurological: Positive for dizziness, numbness and headaches  Negative for seizures, syncope and weakness  Hematological: Does not bruise/bleed easily  Psychiatric/Behavioral: Negative for dysphoric mood  The patient is nervous/anxious  All other systems reviewed and are negative  Patient Active Problem List   Diagnosis    Anxiety associated with depression    Chronic obstructive pulmonary disease (HCC)    Congestive heart failure (HCC)    Coronary artery disease    High cholesterol    Hypertension, essential    Insomnia    Lyme borreliosis    Polyarthropathy    Neuropathy    Myopathy    Inflammatory arthropathy    Health care maintenance    Prostate cancer screening    Colon cancer screening    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[facility] )    Malignant neoplasm of lower lobe of right lung (HCC)    Chronic bilateral low back pain       Past Medical History:   Diagnosis Date    Cancer ([facility] )     Lung Cancer    Hyperlipidemia     Nonischemic cardiomyopathy ([facility] )     last assessed 17; resolved 16       Past Surgical History:   Procedure Laterality Date    CARDIAC CATHETERIZATION      his ablation    CATARACT EXTRACTION, BILATERAL Bilateral 10/01/2020    LIPOMA RESECTION      LUNG CANCER SURGERY      ROTATOR CUFF REPAIR         Family History   Adopted: Yes   Problem Relation Age of Onset    No Known Problems Mother     No Known Problems Father        Social History     Occupational History    Occupation: retired   Tobacco Use    Smoking status: Former Smoker     Packs/day: 1 00     Years: 20 00     Pack years: 20 00     Quit date: 1998     Years since quittin 2    Smokeless tobacco: Never Used   Substance and Sexual Activity    Alcohol use:  Yes     Alcohol/week: 6 0 standard drinks     Types: 6 Glasses of wine per week     Comment: with dinner    Drug use: No    Sexual activity: Yes     Partners: Male       Current Outpatient Medications on File Prior to Visit   Medication Sig    albuterol (PROVENTIL HFA,VENTOLIN HFA) 90 mcg/act inhaler Inhale 1 puff as needed     amiodarone 200 mg tablet Take 1 tablet (200 mg total) by mouth daily    amLODIPine (NORVASC) 2 5 mg tablet Take 1 tablet (2 5 mg total) by mouth daily    apixaban (ELIQUIS) 5 mg Take 1 tablet (5 mg total) by mouth 2 (two) times a day    Besivance 0 6 % SUSP Administer to both eyes 2 (two) times a day     cyclobenzaprine (FLEXERIL) 5 mg tablet Take 5 mg by mouth 3 (three) times a day as needed     fluticasone (FLONASE) 50 mcg/act nasal spray     Fluticasone Furoate-Vilanterol (BREO ELLIPTA IN) Inhale daily    furosemide (LASIX) 20 mg tablet Take 1 tablet by mouth daily    gabapentin (NEURONTIN) 100 mg capsule Take 1 capsule (100 mg total) by mouth 2 (two) times a day    Levothyroxine Sodium 75 MCG CAPS Take 75 mcg by mouth daily    lisinopril (ZESTRIL) 10 mg tablet Take 1 tablet (10 mg total) by mouth daily    metoprolol tartrate (LOPRESSOR) 50 mg tablet Take 1 tablet (50 mg total) by mouth 2 (two) times a day    montelukast (SINGULAIR) 10 mg tablet Take 10 mg by mouth daily     nitroglycerin (NITROSTAT) 0 4 mg SL tablet Place 1 tablet under the tongue    oxyCODONE-acetaminophen (PERCOCET) 5-325 mg per tablet Take 1 tablet by mouth every 4 (four) hours as needed for severe pain for up to 7 dosesMax Daily Amount: 6 tablets    prednisoLONE acetate (PRED FORTE) 1 % ophthalmic suspension Administer 1 drop to both eyes 2 (two) times a day     predniSONE 10 mg tablet Take 40 milligrams for 2 days, 30 milligrams for 2 days, 20 milligrams for 2 days, 10 milligrams for 2 days, then stop    Prolensa 0 07 % SOLN Administer to both eyes 2 (two) times a day     rOPINIRole (REQUIP) 0 5 mg tablet Take 1 tablet (0 5 mg total) by mouth daily at bedtime    senna (SENOKOT) 8 6 MG tablet Take 1 tablet by mouth daily    tamsulosin (FLOMAX) 0 4 mg TAKE 1 CAPSULE BY MOUTH NIGHTLY    TRELEGY ELLIPTA 100-62 5-25 MCG/INH inhaler Inhale 1 puff daily     No current facility-administered medications on file prior to visit          Allergies   Allergen Reactions    Pollen Extract Itching and Sneezing       Physical Exam    /81   Pulse 81   Resp 16   Ht 5' 6" (1 676 m)   Wt 90 3 kg (199 lb)   BMI 32 12 kg/m²     Constitutional: normal, well developed, well nourished, alert, in no distress and non-toxic and no overt pain behavior  Eyes: anicteric  HEENT: grossly intact  Neck: supple, symmetric, trachea midline and no masses   Pulmonary:even and unlabored  Cardiovascular:No edema or pitting edema present  Skin:Normal without rashes or lesions and well hydrated  Psychiatric:Mood and affect appropriate  Neurologic:Cranial Nerves II-XII grossly intact  Musculoskeletal:normal     Lumbar Spine Exam    Appearance:  Normal lordosis  Palpation/Tenderness:  left lumbar paraspinal tenderness  right lumbar paraspinal tenderness  left sacroiliac joint tenderness  right sacroiliac joint tenderness  Sensory:  no sensory deficits noted  Range of Motion:  Flexion:   Moderately limited  with pain  Extension:  Moderately limited  with pain  Lateral Flexion - Left:  Moderately limited  with pain  Lateral Flexion - Right:  Moderately limited  with pain  Rotation - Left:  Moderately limited  with pain  Rotation - Right:  Moderately limited  with pain  Motor Strength:  Left hip flexion:  5/5  Left hip extension:  5/5  Right hip flexion:  4/5  Right hip extension:  5/5  Left knee flexion:  5/5  Left knee extension:  5/5  Right knee flexion:  5/5  Right knee extension:  5/5  Left foot dorsiflexion:  4/5  Left foot plantar flexion:  5/5  Right foot dorsiflexion:  4/5  Right foot plantar flexion:  4/5  Reflexes:  Left Patellar:  3+   Right Patellar:  3+   Left Achilles:  2+   Right Achilles:  3+   Special Tests:  Left Straight Leg Test:  positive  Right Straight Leg Test:  positive  Left Bret's Maneuver:  positive  Right Bret's Maneuver:  positive      DIAGNOSTIC IMAGING AND TEST RESULTS:  Interface, Rad Results In - 10/27/2020  3:27 PM EDT  MRI thoracic spine unenhanced    HISTORY: Mid back pain     COMPARISON: CT thoracic spine 10/26/2020    TECHNIQUE: MRI of the thoracic spine was performed in the sagittal and axial  planes utilizing short and long TE sequences without intravenous contrast  material     Report: The axial T2-weighted sequences are motion degraded  There is normal thoracic spine alignment and kyphosis  Vertebral body height and  marrow signal intensity are within normal limits  There is generalized  interspace desiccation with height loss  There are some scattered a disc bulges  at multiple levels without any moderate or severe spinal stenosis  Disc bulges  are most prominent at T8-9 and T10-11  There may be marginal spinal canal  narrowing at T8-9 and mostly due to prominence of the epidural fat  The disc  bulge at the T10-11 level there is left paracentral in configuration without any  spinal stenosis  Additionally, there is moderate to severe bilateral foraminal  narrowing at T10-11  Thoracic cord signal is grossly within normal limits  There is motion artifact  There are atelectatic changes at the lung bases  The size atelectatic changes,  there appears to be bilateral pneumonia, much worse on the right  Correlation  with patient's symptoms recommended  IMPRESSION:  IMPRESSION:    No acute cord compression  No gross cord edema  Marginal spinal stenosis at  T8-9  Moderate to severe bilateral foraminal stenosis at T10-11  Extensive right greater than left at pneumonia with bibasilar atelectasis  Interface, Rad Results In - 10/27/2020  2:26 PM EDT  MRI lumbar spine unenhanced    HISTORY: Back pain and radiculopathy  COMPARISON: CT lumbar spine examination 10/26/2020 demonstrating lumbar spinal  stenosis and bony foraminal narrowing  No acute fracture  Extensive moderate  right lower lobe pneumonia      TECHNIQUE: MRI of the lumbar spine was performed in the sagittal and axial  planes utilizing short and long TE sequences without intravenous contrast  material     Report:    Demonstrated is a grade 1 retrolisthesis of L1 on L2 and of L2 on L3  There is a  grade 1 retrolisthesis of L3 on L4  There is a grade 1 retrolisthesis of L4 on  L5  Vertebral body height and marrow signal intensity are within normal limits  There is generalized interspace desiccation with height loss  This is worse at  L5-S1  Conus medullaris terminates at L1-2  At T12-L1, there is no spinal or foraminal stenosis  At L1-2, there is minor disc bulging  There is no spinal stenosis  There is no  appreciable foraminal narrowing  At L2-3, there is disc bulging with prominence of the epidural fat  There is no  spinal stenosis  There is bilateral foraminal bulging resulting in narrowing of  the subarticular recesses and overall minimal foraminal narrowing at this level  At L3-4, there is very minimal uncovering of the disc  There is prominence the  epidural fat  There is no spinal stenosis  There is bilateral foraminal bulging  resulting in moderate to severe right and minimal approaching moderate left  foraminal narrowing  At L4-5, there is prominence of the epidural fat  This results in very minimal  spinal stenosis  There is minimal uncovering of the disc  There is a diffuse  posterior disc bulge  Bilateral foraminal bulging is noted  This results in  moderate to severe left and moderate to severe right foraminal stenosis in  conjunction with facet hypertrophy  At L5-S1, there is minimal spinal stenosis due to prominence of the epidural  fat  There is severe bilateral foraminal stenosis with impingement of the  exiting bilateral L5 nerve roots due to a combination of endplate osteophytes,  facet hypertrophy, and disc bulging  Paraspinal soft tissues are unremarkable  IMPRESSION:  IMPRESSION:    Marked prominence of the epidural fat throughout the lumbar spine with  multilevel disc bulging  Despite this, minimal spinal stenosis is only seen at  L4-5 and L5-S1  Foraminal narrowing is most severe at L5-S1 bilaterally with  impingement of the exiting bilateral L5 nerve roots  All these findings are  chronic

## 2021-03-18 ENCOUNTER — TELEPHONE (OUTPATIENT)
Dept: RADIOLOGY | Facility: CLINIC | Age: 71
End: 2021-03-18

## 2021-03-18 NOTE — TELEPHONE ENCOUNTER
S/W wife with Ravi's permission  Scheduled pt for 3/25/21 at 1000, 0945 arrival   Advised last dose of Eliquis on 3/21  Advised  needed  Nothing to eat or drink one hour prior  Loose fitting comfortable pants without and belts/zippers  Denies COVID or COVID symptoms  Denies contact with COVID positive person  Denies any vaccinations and reminded not to get any 2 weeks prior or 2 weeks after    Advised to call and cancel if sick or put on ABX for any infection  CB from now until procedure with any questions

## 2021-03-18 NOTE — TELEPHONE ENCOUNTER
Dr Oliva Has,    This mutual pt will be undergoing a LESI with Dr Rohan Carrillo  It will require a 3 day hold of Eliquis    Do you give permission for pt to hold Eliquis for this procedure?

## 2021-03-25 ENCOUNTER — HOSPITAL ENCOUNTER (OUTPATIENT)
Dept: RADIOLOGY | Facility: CLINIC | Age: 71
Discharge: HOME | End: 2021-03-25
Attending: STUDENT IN AN ORGANIZED HEALTH CARE EDUCATION/TRAINING PROGRAM | Admitting: STUDENT IN AN ORGANIZED HEALTH CARE EDUCATION/TRAINING PROGRAM
Payer: MEDICARE

## 2021-03-25 VITALS
TEMPERATURE: 97.8 F | OXYGEN SATURATION: 97 % | RESPIRATION RATE: 20 BRPM | HEART RATE: 74 BPM | DIASTOLIC BLOOD PRESSURE: 105 MMHG | SYSTOLIC BLOOD PRESSURE: 149 MMHG

## 2021-03-25 DIAGNOSIS — M48.061 NEURAL FORAMINAL STENOSIS OF LUMBAR SPINE: ICD-10-CM

## 2021-03-25 DIAGNOSIS — G89.29 CHRONIC BILATERAL LOW BACK PAIN WITH BILATERAL SCIATICA: ICD-10-CM

## 2021-03-25 DIAGNOSIS — M54.41 CHRONIC BILATERAL LOW BACK PAIN WITH BILATERAL SCIATICA: ICD-10-CM

## 2021-03-25 DIAGNOSIS — M54.42 CHRONIC BILATERAL LOW BACK PAIN WITH BILATERAL SCIATICA: ICD-10-CM

## 2021-03-25 PROCEDURE — 62323 NJX INTERLAMINAR LMBR/SAC: CPT | Performed by: STUDENT IN AN ORGANIZED HEALTH CARE EDUCATION/TRAINING PROGRAM

## 2021-03-25 RX ORDER — METHYLPREDNISOLONE ACETATE 80 MG/ML
80 INJECTION, SUSPENSION INTRA-ARTICULAR; INTRALESIONAL; INTRAMUSCULAR; PARENTERAL; SOFT TISSUE ONCE
Status: COMPLETED | OUTPATIENT
Start: 2021-03-25 | End: 2021-03-25

## 2021-03-25 RX ADMIN — IOHEXOL 1 ML: 300 INJECTION, SOLUTION INTRAVENOUS at 09:54

## 2021-03-25 RX ADMIN — METHYLPREDNISOLONE ACETATE 80 MG: 80 INJECTION, SUSPENSION INTRA-ARTICULAR; INTRALESIONAL; INTRAMUSCULAR; PARENTERAL; SOFT TISSUE at 09:54

## 2021-03-25 NOTE — DISCHARGE INSTR - LAB
Epidural Steroid Injection   WHAT YOU NEED TO KNOW:   An epidural steroid injection (BIANCA) is a procedure to inject steroid medicine into the epidural space  The epidural space is between your spinal cord and vertebrae  Steroids reduce inflammation and fluid buildup in your spine that may be causing pain  You may be given pain medicine along with the steroids  ACTIVITY  · Do not drive or operate machinery today  · No strenuous activity today - bending, lifting, etc   · You may resume normal activites starting tomorrow - start slowly and as tolerated  · You may shower today, but no tub baths or hot tubs  · You may have numbness for several hours from the local anesthetic  Please use caution and common sense, especially with weight-bearing activities  CARE OF THE INJECTION SITE  · If you have soreness or pain, apply ice to the area today (20 minutes on/20 minutes off)  · Starting tomorrow, you may use warm, moist heat or ice if needed  · You may have an increase or change in your discomfort for 36-48 hours after your treatment  · Apply ice and continue with any pain medication you have been prescribed  · Notify [facility] if you have any of the following: redness, drainage, swelling, headache, stiff neck or fever above 100°F     SPECIAL INSTRUCTIONS  · Our office will contact you in approximately 7 days for a progress report  MEDICATIONS  · Continue to take all routine medications  · Our office may have instructed you to hold some medications  As no general anesthesia was used in today's procedure, you should not experience any side effects related to anesthesia  If you have a problem specifically related to your procedure, please call our office at [phone]  Problems not related to your procedure should be directed to your primary care physician

## 2021-03-25 NOTE — INTERVAL H&P NOTE
Update: (This section must be completed if the H&P was completed greater than 24 hrs to procedure or admission)    H&P reviewed  After examining the patient, I find no changed to the H&P since it had been written  Patient re-evaluated   Accept as history and physical     Esther Rodriguez MD/March 25, 2021/9:41 AM

## 2021-04-01 ENCOUNTER — TELEPHONE (OUTPATIENT)
Dept: PAIN MEDICINE | Facility: CLINIC | Age: 71
End: 2021-04-01

## 2021-04-05 ENCOUNTER — OFFICE VISIT (OUTPATIENT)
Dept: INTERNAL MEDICINE CLINIC | Facility: CLINIC | Age: 71
End: 2021-04-05
Payer: MEDICARE

## 2021-04-05 VITALS
WEIGHT: 206.6 LBS | BODY MASS INDEX: 33.2 KG/M2 | HEART RATE: 80 BPM | SYSTOLIC BLOOD PRESSURE: 152 MMHG | OXYGEN SATURATION: 97 % | DIASTOLIC BLOOD PRESSURE: 100 MMHG | TEMPERATURE: 98.7 F | HEIGHT: 66 IN

## 2021-04-05 DIAGNOSIS — Z23 ENCOUNTER FOR IMMUNIZATION: ICD-10-CM

## 2021-04-05 DIAGNOSIS — I10 HYPERTENSION, UNSPECIFIED TYPE: ICD-10-CM

## 2021-04-05 DIAGNOSIS — C34.31 PRIMARY ADENOCARCINOMA OF LOWER LOBE OF RIGHT LUNG (HCC): ICD-10-CM

## 2021-04-05 DIAGNOSIS — A69.23 LYME ARTHRITIS (HCC): ICD-10-CM

## 2021-04-05 DIAGNOSIS — J44.9 CHRONIC OBSTRUCTIVE PULMONARY DISEASE, UNSPECIFIED COPD TYPE (HCC): ICD-10-CM

## 2021-04-05 DIAGNOSIS — I50.42 CHRONIC COMBINED SYSTOLIC AND DIASTOLIC CONGESTIVE HEART FAILURE (HCC): ICD-10-CM

## 2021-04-05 DIAGNOSIS — E78.5 HYPERLIPIDEMIA, UNSPECIFIED HYPERLIPIDEMIA TYPE: ICD-10-CM

## 2021-04-05 DIAGNOSIS — E78.5 HYPERLIPOPROTEINEMIA: ICD-10-CM

## 2021-04-05 DIAGNOSIS — J96.01 PUERPERAL SEPSIS WITH ACUTE HYPOXIC RESPIRATORY FAILURE WITHOUT SEPTIC SHOCK (HCC): ICD-10-CM

## 2021-04-05 DIAGNOSIS — G62.9 NEUROPATHY: ICD-10-CM

## 2021-04-05 DIAGNOSIS — Z11.59 NEED FOR HEPATITIS C SCREENING TEST: ICD-10-CM

## 2021-04-05 DIAGNOSIS — E03.9 ACQUIRED HYPOTHYROIDISM: ICD-10-CM

## 2021-04-05 DIAGNOSIS — R65.20 PUERPERAL SEPSIS WITH ACUTE HYPOXIC RESPIRATORY FAILURE WITHOUT SEPTIC SHOCK (HCC): ICD-10-CM

## 2021-04-05 DIAGNOSIS — G25.81 RESTLESS LEG: ICD-10-CM

## 2021-04-05 DIAGNOSIS — J44.9 CHRONIC OBSTRUCTIVE PULMONARY DISEASE, UNSPECIFIED COPD TYPE (HCC): Primary | ICD-10-CM

## 2021-04-05 DIAGNOSIS — N40.0 BENIGN PROSTATIC HYPERPLASIA WITHOUT LOWER URINARY TRACT SYMPTOMS: ICD-10-CM

## 2021-04-05 DIAGNOSIS — I10 HYPERTENSION, ESSENTIAL: ICD-10-CM

## 2021-04-05 DIAGNOSIS — I10 HYPERTENSION, ESSENTIAL: Primary | ICD-10-CM

## 2021-04-05 DIAGNOSIS — I48.91 ATRIAL FIBRILLATION, UNSPECIFIED TYPE (HCC): ICD-10-CM

## 2021-04-05 DIAGNOSIS — I48.0 PAF (PAROXYSMAL ATRIAL FIBRILLATION) (HCC): ICD-10-CM

## 2021-04-05 DIAGNOSIS — E46 PROTEIN-CALORIE MALNUTRITION, UNSPECIFIED SEVERITY (HCC): ICD-10-CM

## 2021-04-05 DIAGNOSIS — R41.89 COGNITIVE CHANGE: ICD-10-CM

## 2021-04-05 DIAGNOSIS — M13.0 POLYARTHROPATHY: ICD-10-CM

## 2021-04-05 DIAGNOSIS — H10.509 BLEPHAROCONJUNCTIVITIS, UNSPECIFIED BLEPHAROCONJUNCTIVITIS TYPE, UNSPECIFIED LATERALITY: ICD-10-CM

## 2021-04-05 PROCEDURE — 99214 OFFICE O/P EST MOD 30 MIN: CPT | Performed by: PHYSICIAN ASSISTANT

## 2021-04-05 RX ORDER — METOPROLOL TARTRATE 50 MG/1
50 TABLET, FILM COATED ORAL 2 TIMES DAILY
Qty: 180 TABLET | Refills: 3 | Status: SHIPPED | OUTPATIENT
Start: 2021-04-05 | End: 2021-07-14 | Stop reason: SDUPTHER

## 2021-04-05 RX ORDER — FLUTICASONE FUROATE, UMECLIDINIUM BROMIDE AND VILANTEROL TRIFENATATE 100; 62.5; 25 UG/1; UG/1; UG/1
1 POWDER RESPIRATORY (INHALATION) DAILY
Qty: 1 INHALER | Refills: 5 | Status: SHIPPED | OUTPATIENT
Start: 2021-04-05

## 2021-04-05 RX ORDER — ALBUTEROL SULFATE 90 UG/1
1 AEROSOL, METERED RESPIRATORY (INHALATION) EVERY 4 HOURS PRN
Qty: 1 INHALER | Refills: 6 | Status: SHIPPED | OUTPATIENT
Start: 2021-04-05

## 2021-04-05 RX ORDER — FLUTICASONE PROPIONATE 50 MCG
1 SPRAY, SUSPENSION (ML) NASAL DAILY
Qty: 1 BOTTLE | Refills: 3 | Status: SHIPPED | OUTPATIENT
Start: 2021-04-05

## 2021-04-05 RX ORDER — DONEPEZIL HYDROCHLORIDE 10 MG/1
10 TABLET, FILM COATED ORAL
Qty: 90 TABLET | Refills: 3 | Status: SHIPPED | OUTPATIENT
Start: 2021-04-05 | End: 2022-05-13

## 2021-04-05 RX ORDER — MONTELUKAST SODIUM 10 MG/1
10 TABLET ORAL DAILY
Qty: 90 TABLET | Refills: 3 | Status: SHIPPED | OUTPATIENT
Start: 2021-04-05 | End: 2022-05-13

## 2021-04-05 RX ORDER — LISINOPRIL 10 MG/1
10 TABLET ORAL DAILY
Qty: 90 TABLET | Refills: 3 | Status: SHIPPED | OUTPATIENT
Start: 2021-04-05 | End: 2021-06-19

## 2021-04-05 RX ORDER — ATORVASTATIN CALCIUM 10 MG/1
10 TABLET, FILM COATED ORAL DAILY
Qty: 90 TABLET | Refills: 3 | Status: SHIPPED | OUTPATIENT
Start: 2021-04-05

## 2021-04-05 RX ORDER — BESIFLOXACIN 6 MG/ML
SUSPENSION OPHTHALMIC
Qty: 5 ML | Refills: 1 | Status: SHIPPED | OUTPATIENT
Start: 2021-04-05 | End: 2021-06-19

## 2021-04-05 RX ORDER — FUROSEMIDE 20 MG/1
20 TABLET ORAL DAILY
Qty: 90 TABLET | Refills: 3 | Status: SHIPPED | OUTPATIENT
Start: 2021-04-05 | End: 2021-04-28 | Stop reason: DRUGHIGH

## 2021-04-05 RX ORDER — ROPINIROLE 0.5 MG/1
0.5 TABLET, FILM COATED ORAL
Qty: 90 TABLET | Refills: 3 | Status: SHIPPED | OUTPATIENT
Start: 2021-04-05

## 2021-04-05 RX ORDER — TAMSULOSIN HYDROCHLORIDE 0.4 MG/1
0.4 CAPSULE ORAL
Qty: 90 CAPSULE | Refills: 3 | Status: SHIPPED | OUTPATIENT
Start: 2021-04-05 | End: 2022-04-08

## 2021-04-05 RX ORDER — LEVOTHYROXINE SODIUM 75 UG/1
75 CAPSULE ORAL DAILY
Qty: 90 CAPSULE | Refills: 3 | Status: SHIPPED | OUTPATIENT
Start: 2021-04-05 | End: 2021-07-04

## 2021-04-05 RX ORDER — AMLODIPINE BESYLATE 2.5 MG/1
2.5 TABLET ORAL DAILY
Qty: 30 TABLET | Refills: 0 | Status: CANCELLED
Start: 2021-04-05

## 2021-04-05 RX ORDER — MONTELUKAST SODIUM 10 MG/1
10 TABLET ORAL DAILY
Qty: 90 TABLET | Refills: 3 | Status: CANCELLED | OUTPATIENT
Start: 2021-04-05

## 2021-04-05 RX ORDER — NITROGLYCERIN 0.4 MG/1
0.4 TABLET SUBLINGUAL
Qty: 25 TABLET | Refills: 3 | Status: SHIPPED | OUTPATIENT
Start: 2021-04-05

## 2021-04-05 RX ORDER — AMLODIPINE BESYLATE 10 MG/1
10 TABLET ORAL DAILY
Qty: 90 TABLET | Refills: 3
Start: 2021-04-05 | End: 2021-04-28 | Stop reason: DRUGHIGH

## 2021-04-05 RX ORDER — CYCLOBENZAPRINE HCL 5 MG
5 TABLET ORAL 3 TIMES DAILY PRN
Qty: 60 TABLET | Refills: 2 | Status: SHIPPED | OUTPATIENT
Start: 2021-04-05 | End: 2021-06-19

## 2021-04-05 RX ORDER — AMIODARONE HYDROCHLORIDE 200 MG/1
200 TABLET ORAL DAILY
Qty: 90 TABLET | Refills: 3 | Status: SHIPPED | OUTPATIENT
Start: 2021-04-05 | End: 2022-05-13

## 2021-04-05 RX ORDER — PREDNISONE 10 MG/1
TABLET ORAL
Qty: 20 TABLET | Refills: 0 | Status: SHIPPED | OUTPATIENT
Start: 2021-04-05 | End: 2021-06-19

## 2021-04-05 RX ORDER — GABAPENTIN 100 MG/1
100 CAPSULE ORAL 2 TIMES DAILY
Qty: 180 CAPSULE | Refills: 3 | Status: SHIPPED | OUTPATIENT
Start: 2021-04-05 | End: 2022-04-08

## 2021-04-05 NOTE — PROGRESS NOTES
Assessment/Plan: ongoing problem with shortness of breath chest discomfort with exertion and especially at night  Reviewed recent labs and PFTs unremarkable  Oxygen saturation in the high 90s even with a fast walk around the building  Will get a chest x-ray check D-dimer  Blood pressure is up increasing amlodipine  Other meds the same  Two month follow-up  He will follow-up with his pulmonologist and cardiologist   He feels that he has some cognitive problems he would like to restart Aricept  Appears to be getting depressed will address this next visit       Diagnoses and all orders for this visit:    Hypertension, essential  -     amLODIPine (NORVASC) 10 mg tablet; Take 1 tablet (10 mg total) by mouth daily    Need for hepatitis C screening test  -     Hepatitis C Antibody ([facility]); Future    Encounter for immunization    Primary adenocarcinoma of lower lobe of right lung ([facility] [address] [address] )    Protein-calorie malnutrition, unspecified severity ([facility] )    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[facility] )    Chronic obstructive pulmonary disease, unspecified COPD type ([facility] )  -     XR chest pa & lateral; Future  -     D-dimer, quantitative; Future  -     Lipid panel; Future  -     montelukast (SINGULAIR) 10 mg tablet; Take 1 tablet (10 mg total) by mouth daily    Chronic combined systolic and diastolic congestive heart failure (HCC)  -     XR chest pa & lateral; Future  -     D-dimer, quantitative; Future  -     Lipid panel; Future    Puerperal sepsis with acute hypoxic respiratory failure without septic shock (HCC)    Atrial fibrillation, unspecified type (HCC)    Cognitive change  -     donepezil (ARICEPT) 10 mg tablet; Take 1 tablet (10 mg total) by mouth daily at bedtime    Hyperlipoproteinemia    Hyperlipidemia, unspecified hyperlipidemia type  -     atorvastatin (LIPITOR) 10 mg tablet;  Take 1 tablet (10 mg total) by mouth daily    Other orders  -     Cancel: TDAP VACCINE GREATER THAN OR EQUAL TO 6YO IM        No problem-specific Assessment & Plan notes found for this encounter  Subjective:      Patient ID: Tu Davis is a 70 y o  male  He is here for follow-up  Ongoing complaints of shortness of breath and fatigued with exertion  He is specially feels short of breath at night  His appetite is good minimal coughing no chest pain palpitations or dizziness  He has been feeling this way since complicated admission with pneumonia sepsis 6 months ago  He is followed by Pulmonary and Cardiology  Had recent PFTs which were good  Negative cardiac catheterization 1 year ago  history of cardiomyopathy Afib hyperlipidemia well controlled with meds  He is anticoagulated  Has had lung cancer removed      The following portions of the patient's history were reviewed and updated as appropriate:   He has a past medical history of Cancer ([facility] ), Hyperlipidemia, and Nonischemic cardiomyopathy ([facility] )  ,  does not have any pertinent problems on file  ,   has a past surgical history that includes Cardiac catheterization; Lipoma resection; Rotator cuff repair; Lung cancer surgery; and Cataract extraction, bilateral (Bilateral, 10/01/2020)  ,  family history includes No Known Problems in his father and mother  He was adopted  ,   reports that he quit smoking about 22 years ago  He has a 20 00 pack-year smoking history  He has never used smokeless tobacco  He reports current alcohol use of about 6 0 standard drinks of alcohol per week  He reports that he does not use drugs  ,  is allergic to pollen extract     Current Outpatient Medications   Medication Sig Dispense Refill    amLODIPine (NORVASC) 10 mg tablet Take 1 tablet (10 mg total) by mouth daily 90 tablet 3    montelukast (SINGULAIR) 10 mg tablet Take 1 tablet (10 mg total) by mouth daily 90 tablet 3    Prolensa 0 07 % SOLN Administer to both eyes 2 (two) times a day       albuterol (PROVENTIL HFA,VENTOLIN HFA) 90 mcg/act inhaler Inhale 1 puff every 4 (four) hours as needed for wheezing 1 Inhaler 6    amiodarone 200 mg tablet Take 1 tablet (200 mg total) by mouth daily 90 tablet 3    apixaban (ELIQUIS) 5 mg Take 1 tablet (5 mg total) by mouth 2 (two) times a day 60 tablet 11    atorvastatin (LIPITOR) 10 mg tablet Take 1 tablet (10 mg total) by mouth daily 90 tablet 3    Besivance 0 6 % SUSP 1 drop both eyes twice a day 5 mL 1    cyclobenzaprine (FLEXERIL) 5 mg tablet Take 1 tablet (5 mg total) by mouth 3 (three) times a day as needed for muscle spasms 60 tablet 2    donepezil (ARICEPT) 10 mg tablet Take 1 tablet (10 mg total) by mouth daily at bedtime 90 tablet 3    fluticasone (FLONASE) 50 mcg/act nasal spray 1 spray into each nostril daily 1 Bottle 3    Fluticasone Furoate-Vilanterol (BREO ELLIPTA IN) Inhale daily      furosemide (LASIX) 20 mg tablet Take 1 tablet (20 mg total) by mouth daily 90 tablet 3    gabapentin (NEURONTIN) 100 mg capsule Take 1 capsule (100 mg total) by mouth 2 (two) times a day 180 capsule 3    Levothyroxine Sodium 75 MCG CAPS Take 1 capsule (75 mcg total) by mouth daily 90 capsule 3    lisinopril (ZESTRIL) 10 mg tablet Take 1 tablet (10 mg total) by mouth daily 90 tablet 3    metoprolol tartrate (LOPRESSOR) 50 mg tablet Take 1 tablet (50 mg total) by mouth 2 (two) times a day 180 tablet 3    nitroglycerin (Nitrostat) 0 4 mg SL tablet Place 1 tablet (0 4 mg total) under the tongue every 5 (five) minutes as needed for chest pain 25 tablet 3    prednisoLONE acetate (PRED FORTE) 1 % ophthalmic suspension Administer 1 drop to both eyes 2 (two) times a day       predniSONE 10 mg tablet Take 40 milligrams for 2 days, 30 milligrams for 2 days, 20 milligrams for 2 days, 10 milligrams for 2 days, then stop 20 tablet 0    rOPINIRole (REQUIP) 0 5 mg tablet Take 1 tablet (0 5 mg total) by mouth daily at bedtime 90 tablet 3    senna (SENOKOT) 8 6 MG tablet Take 1 tablet by mouth daily      tamsulosin (FLOMAX) 0 4 mg Take 1 capsule (0 4 mg total) by mouth daily with dinner 90 capsule 3    Trelegy Ellipta 100-62 5-25 MCG/INH inhaler Inhale 1 puff daily 1 Inhaler 5     No current facility-administered medications for this visit  Review of Systems   Constitutional: Positive for fatigue  Negative for chills and fever  HENT: Negative for ear pain and sore throat  Eyes: Negative for pain and visual disturbance  Respiratory: Positive for chest tightness and shortness of breath  Negative for cough  Cardiovascular: Negative for chest pain and palpitations  Gastrointestinal: Negative for abdominal pain and vomiting  Genitourinary: Negative for dysuria and hematuria  Musculoskeletal: Negative for arthralgias and back pain  Skin: Negative for color change and rash  Neurological: Positive for weakness  Negative for seizures and syncope  Psychiatric/Behavioral: Positive for decreased concentration  All other systems reviewed and are negative  Objective:  Vitals:    04/05/21 1031   BP: 152/100   BP Location: Left arm   Patient Position: Sitting   Cuff Size: Standard   Pulse: 80   Temp: 98 7 °F (37 1 °C)   TempSrc: Temporal   SpO2: 97%   Weight: 93 7 kg (206 lb 9 6 oz)   Height: 5' 6" (1 676 m)     Body mass index is 33 35 kg/m²  Physical Exam  Vitals signs reviewed  Constitutional:       Appearance: He is well-developed  He is obese  HENT:      Head: Normocephalic  Right Ear: Tympanic membrane and external ear normal       Left Ear: Tympanic membrane and external ear normal       Nose: Nose normal       Mouth/Throat:      Mouth: Mucous membranes are moist    Eyes:      Extraocular Movements: Extraocular movements intact  Conjunctiva/sclera: Conjunctivae normal       Pupils: Pupils are equal, round, and reactive to light  Neck:      Musculoskeletal: Normal range of motion and neck supple  Thyroid: No thyromegaly  Vascular: No carotid bruit  Cardiovascular:      Rate and Rhythm: Normal rate and regular rhythm        Pulses: Normal pulses  Heart sounds: Normal heart sounds  No murmur  Pulmonary:      Effort: Pulmonary effort is normal  No respiratory distress  Breath sounds: Normal breath sounds  No wheezing, rhonchi or rales  Abdominal:      General: Abdomen is flat  Bowel sounds are normal       Palpations: Abdomen is soft  Musculoskeletal: Normal range of motion  Lymphadenopathy:      Cervical: No cervical adenopathy  Skin:     General: Skin is warm and dry  Neurological:      General: No focal deficit present  Mental Status: He is alert and oriented to person, place, and time  Deep Tendon Reflexes: Reflexes are normal and symmetric  Psychiatric:         Mood and Affect: Mood normal          Thought Content:  Thought content normal          Judgment: Judgment normal

## 2021-04-06 ENCOUNTER — TELEPHONE (OUTPATIENT)
Dept: INTERNAL MEDICINE CLINIC | Facility: CLINIC | Age: 71
End: 2021-04-06

## 2021-04-06 DIAGNOSIS — E03.9 ACQUIRED HYPOTHYROIDISM: Primary | ICD-10-CM

## 2021-04-06 RX ORDER — LEVOTHYROXINE SODIUM 0.07 MG/1
75 TABLET ORAL DAILY
Qty: 90 TABLET | Refills: 3 | Status: SHIPPED | OUTPATIENT
Start: 2021-04-06 | End: 2022-05-13

## 2021-04-06 NOTE — TELEPHONE ENCOUNTER
Judie Gray/ Paul Borrero is working on prior [address] for Levothyroxine Sodium 75 MCG CAPS and said, this being a non formulary med, needs to know if pt has tried & failed on any other med's ???

## 2021-04-06 NOTE — TELEPHONE ENCOUNTER
Started a Prior Auth for the patient for Levothyroxine and it was rejected because they said it is a non preferred drug and would be a higher co-pay for patients       Their preferred medication that doesn't need a Prior Auth is:  Synthroid 75 Mcg Tab tier-3      And, the only medication they will take a Prior Auth for is:   Tirosint 75 Mcg Cap tier-1  Tirosint-Sol 100 Mcg/Ml Soln tier-2,  Please advise

## 2021-04-08 NOTE — TELEPHONE ENCOUNTER
Delroy Kingston from the [employer] company called to find out if the patient has ever used any other medication on formulary and failed in the past but from his medication history it looks like he has only been on Levothyroxine so I explained this to Michael Correa and she said they would try to push the PA through and in the meantime the patient will try the medication, "Synthroid", Portia Smith has already sent a prescription to the pharmacy and it looks like it has been filled and picked up by the patient

## 2021-04-26 ENCOUNTER — TELEPHONE (OUTPATIENT)
Dept: INTERNAL MEDICINE CLINIC | Facility: CLINIC | Age: 71
End: 2021-04-26

## 2021-04-26 NOTE — TELEPHONE ENCOUNTER
Pre authorization appeal approved for levothyroxine , notified pharmacist, they say he picked up 90 day supply 4/6/21

## 2021-04-28 ENCOUNTER — TELEPHONE (OUTPATIENT)
Dept: GASTROENTEROLOGY | Facility: CLINIC | Age: 71
End: 2021-04-28

## 2021-04-28 ENCOUNTER — OFFICE VISIT (OUTPATIENT)
Dept: GASTROENTEROLOGY | Facility: CLINIC | Age: 71
End: 2021-04-28
Payer: MEDICARE

## 2021-04-28 VITALS
HEART RATE: 80 BPM | BODY MASS INDEX: 32.78 KG/M2 | DIASTOLIC BLOOD PRESSURE: 72 MMHG | HEIGHT: 66 IN | SYSTOLIC BLOOD PRESSURE: 144 MMHG | WEIGHT: 204 LBS

## 2021-04-28 DIAGNOSIS — K59.00 CONSTIPATION, UNSPECIFIED CONSTIPATION TYPE: ICD-10-CM

## 2021-04-28 DIAGNOSIS — K57.92 DIVERTICULITIS: ICD-10-CM

## 2021-04-28 DIAGNOSIS — K92.1 BLOOD IN STOOL: ICD-10-CM

## 2021-04-28 DIAGNOSIS — R10.13 EPIGASTRIC PAIN: Primary | ICD-10-CM

## 2021-04-28 DIAGNOSIS — K21.9 GASTROESOPHAGEAL REFLUX DISEASE, UNSPECIFIED WHETHER ESOPHAGITIS PRESENT: ICD-10-CM

## 2021-04-28 DIAGNOSIS — Z86.010 HISTORY OF COLON POLYPS: ICD-10-CM

## 2021-04-28 PROCEDURE — 99204 OFFICE O/P NEW MOD 45 MIN: CPT | Performed by: PHYSICIAN ASSISTANT

## 2021-04-28 RX ORDER — AMLODIPINE BESYLATE 5 MG/1
TABLET ORAL
COMMUNITY
Start: 2021-04-24 | End: 2021-11-23 | Stop reason: SDUPTHER

## 2021-04-28 RX ORDER — PANTOPRAZOLE SODIUM 40 MG/1
40 TABLET, DELAYED RELEASE ORAL DAILY
Qty: 30 TABLET | Refills: 1 | Status: SHIPPED | OUTPATIENT
Start: 2021-04-28

## 2021-04-28 NOTE — PROGRESS NOTES
[facility] - Outpatient Consultation  Caprice Rivera 70 y o  male MRN: [id number]  Encounter: [id number]          ASSESSMENT AND PLAN:      1  Epigastric pain  2  Gastroesophageal reflux disease  3  Blood in stool  4  Diverticulitis  5  Constipation  6  History of colon polyps    Patient reports of epigastric abdominal pain x several months  He also reports of constipation and blood in his stool a couple of months ago  He had diverticulitis noted on CT Scan in October that was treated with an antibiotic course  Will plan for EGD and colonoscopy to investigate - evaluate for PUD, erosive gastritis, h pylori, etc   Will check ultrasound  Will begin a course of Pantoprazole 40mg po daily x 8 weeks  Recommend to increase Miralax to BID  Follow up in 3-4 weeks  ______________________________________________________________________    HPI:  Patient is a 70year old male with a PMH of lung cancer, atrial fibrillation, and a cardiomyopathy who presents to the office for a gastrointestinal evaluation  Patient reports that for several months he has been experiencing abdominal pain  He reports the pain is located in the epigastric region  He reports noticing the pain after eating  He also reports of reflux, nausea, and regurgitation  He denies vomiting  He also reports constipation despite taking Miralax daily  He also was treated for diverticulitis seen on CT Scan in October  He reports his last colonoscopy was about 8 years ago  He has a history of colon polyps  He reports a couple of months ago he also saw blood in his stool  He had a very long hospitalization over the fall where he had pneumonia, sepsis, and respiratory failure  He is on Eliquis for atrial fibrillation  He is not on a PPI  REVIEW OF SYSTEMS:    CONSTITUTIONAL: Denies any fever, chills, rigors, and weight loss  HEENT: No earache or tinnitus  Denies hearing loss or visual disturbances    CARDIOVASCULAR: No chest pain or palpitations  RESPIRATORY: Denies any cough, hemoptysis; + shortness of breath/dyspnea on exertion  GASTROINTESTINAL: As noted in the History of Present Illness  GENITOURINARY: No problems with urination  Denies any hematuria or dysuria  NEUROLOGIC: No dizziness or vertigo, denies headaches  MUSCULOSKELETAL: Denies any muscle or joint pain  SKIN: Denies skin rashes or itching  ENDOCRINE: Denies excessive thirst  Denies intolerance to heat or cold  PSYCHOSOCIAL: Denies depression or anxiety  Denies any recent memory loss         Historical Information   Past Medical History:   Diagnosis Date    Cancer ([facility] )     Lung Cancer    Hyperlipidemia     Nonischemic cardiomyopathy ([facility] )     last assessed 17; resolved 16     Past Surgical History:   Procedure Laterality Date    CARDIAC CATHETERIZATION      his ablation    CATARACT EXTRACTION, BILATERAL Bilateral 10/01/2020    LIPOMA RESECTION      LUNG CANCER SURGERY      ROTATOR CUFF REPAIR       Social History   Social History     Substance and Sexual Activity   Alcohol Use Yes    Alcohol/week: 6 0 standard drinks    Types: 6 Glasses of wine per week    Comment: with dinner     Social History     Substance and Sexual Activity   Drug Use No     Social History     Tobacco Use   Smoking Status Former Smoker    Packs/day:     Years: 20 00    Pack years: 20 00    Quit date: 1998    Years since quittin 4   Smokeless Tobacco Never Used     Family History   Adopted: Yes   Problem Relation Age of Onset    No Known Problems Mother     No Known Problems Father        Meds/Allergies       Current Outpatient Medications:     albuterol (PROVENTIL HFA,VENTOLIN HFA) 90 mcg/act inhaler    amiodarone 200 mg tablet    amLODIPine (NORVASC) 5 mg tablet    apixaban (ELIQUIS) 5 mg    atorvastatin (LIPITOR) 10 mg tablet    Besivance 0 6 % SUSP    cyclobenzaprine (FLEXERIL) 5 mg tablet    donepezil (ARICEPT) 10 mg tablet   fluticasone (FLONASE) 50 mcg/act nasal spray    gabapentin (NEURONTIN) 100 mg capsule    levothyroxine 75 mcg tablet    Levothyroxine Sodium 75 MCG CAPS    lisinopril (ZESTRIL) 10 mg tablet    metoprolol tartrate (LOPRESSOR) 50 mg tablet    montelukast (SINGULAIR) 10 mg tablet    nitroglycerin (Nitrostat) 0 4 mg SL tablet    prednisoLONE acetate (PRED FORTE) 1 % ophthalmic suspension    predniSONE 10 mg tablet    rOPINIRole (REQUIP) 0 5 mg tablet    tamsulosin (FLOMAX) 0 4 mg    Trelegy Ellipta 100-62 5-25 MCG/INH inhaler    Fluticasone Furoate-Vilanterol (BREO ELLIPTA IN)    pantoprazole (PROTONIX) 40 mg tablet    Prolensa 0 07 % SOLN    senna (SENOKOT) 8 6 MG tablet    Allergies   Allergen Reactions    Pollen Extract Itching and Sneezing           Objective     Blood pressure 144/72, pulse 80, height 5' 6" (1 676 m), weight 92 5 kg (204 lb)  Body mass index is 32 93 kg/m²  PHYSICAL EXAM:      General Appearance:   Alert, cooperative, no distress   HEENT:   Normocephalic, atraumatic, anicteric     Neck:  Supple, symmetrical, trachea midline   Lungs:   Scant wheeze right lung field   Heart[de-identified]   Regular rate and rhythm; no murmur, rub, or gallop  Abdomen:   Soft, mild epigastric TTP, non-distended; normal bowel sounds; no masses, no organomegaly    Genitalia:   Deferred    Rectal:   Deferred    Extremities:  No cyanosis, clubbing   Pulses:  2+ and symmetric    Skin:  No jaundice, rashes, or lesions    Lymph nodes:  No palpable cervical lymphadenopathy        Lab Results:   No visits with results within 1 Day(s) from this visit  Latest known visit with results is:   Lab on 02/08/2021   Component Date Value    OCCULT BLD, FECAL IMMUNO* 02/08/2021 Negative          Radiology Results:   No results found

## 2021-05-15 ENCOUNTER — HOSPITAL ENCOUNTER (OUTPATIENT)
Dept: ULTRASOUND IMAGING | Facility: HOSPITAL | Age: 71
Discharge: HOME | End: 2021-05-15
Payer: MEDICARE

## 2021-05-15 DIAGNOSIS — R10.13 EPIGASTRIC PAIN: ICD-10-CM

## 2021-05-15 PROCEDURE — 76700 US EXAM ABDOM COMPLETE: CPT

## 2021-05-19 ENCOUNTER — TELEPHONE (OUTPATIENT)
Dept: GASTROENTEROLOGY | Facility: CLINIC | Age: 71
End: 2021-05-19

## 2021-05-19 DIAGNOSIS — K82.4 GALLBLADDER POLYP: Primary | ICD-10-CM

## 2021-05-19 NOTE — TELEPHONE ENCOUNTER
----- Message from Claudio Mera PA-C sent at 5/19/2021 11:35 AM EDT -----  Please inform patient that the ultrasound showed a mild fatty liver and a small 5mm benign gallbladder polyp  Recommend weight loss with diet and exercise for treatment of the fatty liver  Recommend follow up ultrasound in 1 year to recheck the gallbladder polyp - order entered

## 2021-06-15 ENCOUNTER — TELEPHONE (OUTPATIENT)
Dept: GASTROENTEROLOGY | Facility: HOSPITAL | Age: 71
End: 2021-06-15

## 2021-06-18 ENCOUNTER — TELEPHONE (OUTPATIENT)
Dept: GASTROENTEROLOGY | Facility: HOSPITAL | Age: 71
End: 2021-06-18

## 2021-06-19 ENCOUNTER — TELEPHONE (OUTPATIENT)
Dept: GASTROENTEROLOGY | Facility: CLINIC | Age: 71
End: 2021-06-19

## 2021-06-19 ENCOUNTER — HOSPITAL ENCOUNTER (OUTPATIENT)
Dept: GASTROENTEROLOGY | Facility: HOSPITAL | Age: 71
Setting detail: OUTPATIENT SURGERY
Discharge: HOME | End: 2021-06-19
Attending: INTERNAL MEDICINE | Admitting: INTERNAL MEDICINE
Payer: MEDICARE

## 2021-06-19 ENCOUNTER — ANESTHESIA (OUTPATIENT)
Dept: GASTROENTEROLOGY | Facility: HOSPITAL | Age: 71
End: 2021-06-19

## 2021-06-19 ENCOUNTER — ANESTHESIA EVENT (OUTPATIENT)
Dept: GASTROENTEROLOGY | Facility: HOSPITAL | Age: 71
End: 2021-06-19

## 2021-06-19 VITALS
SYSTOLIC BLOOD PRESSURE: 134 MMHG | TEMPERATURE: 98.1 F | HEART RATE: 76 BPM | OXYGEN SATURATION: 96 % | RESPIRATION RATE: 20 BRPM | DIASTOLIC BLOOD PRESSURE: 78 MMHG | WEIGHT: 189.38 LBS | BODY MASS INDEX: 30.44 KG/M2 | HEIGHT: 66 IN

## 2021-06-19 DIAGNOSIS — R10.13 EPIGASTRIC PAIN: ICD-10-CM

## 2021-06-19 PROCEDURE — 88305 TISSUE EXAM BY PATHOLOGIST: CPT | Performed by: PATHOLOGY

## 2021-06-19 PROCEDURE — 88342 IMHCHEM/IMCYTCHM 1ST ANTB: CPT | Performed by: PATHOLOGY

## 2021-06-19 PROCEDURE — 45385 COLONOSCOPY W/LESION REMOVAL: CPT | Performed by: INTERNAL MEDICINE

## 2021-06-19 PROCEDURE — 43239 EGD BIOPSY SINGLE/MULTIPLE: CPT | Performed by: INTERNAL MEDICINE

## 2021-06-19 RX ORDER — PROPOFOL 10 MG/ML
INJECTION, EMULSION INTRAVENOUS AS NEEDED
Status: DISCONTINUED | OUTPATIENT
Start: 2021-06-19 | End: 2021-06-19

## 2021-06-19 RX ORDER — PREDNISONE 1 MG/1
TABLET ORAL AS NEEDED
COMMUNITY

## 2021-06-19 RX ORDER — SODIUM CHLORIDE, SODIUM LACTATE, POTASSIUM CHLORIDE, CALCIUM CHLORIDE 600; 310; 30; 20 MG/100ML; MG/100ML; MG/100ML; MG/100ML
125 INJECTION, SOLUTION INTRAVENOUS CONTINUOUS
Status: DISCONTINUED | OUTPATIENT
Start: 2021-06-19 | End: 2021-06-23 | Stop reason: HOSPADM

## 2021-06-19 RX ORDER — LIDOCAINE HYDROCHLORIDE 10 MG/ML
INJECTION, SOLUTION EPIDURAL; INFILTRATION; INTRACAUDAL; PERINEURAL AS NEEDED
Status: DISCONTINUED | OUTPATIENT
Start: 2021-06-19 | End: 2021-06-19

## 2021-06-19 RX ORDER — LISINOPRIL 10 MG/1
10 TABLET ORAL 2 TIMES DAILY
COMMUNITY
End: 2021-07-14 | Stop reason: SDUPTHER

## 2021-06-19 RX ORDER — LIDOCAINE HYDROCHLORIDE 10 MG/ML
0.5 INJECTION, SOLUTION EPIDURAL; INFILTRATION; INTRACAUDAL; PERINEURAL ONCE AS NEEDED
Status: DISCONTINUED | OUTPATIENT
Start: 2021-06-19 | End: 2021-06-23 | Stop reason: HOSPADM

## 2021-06-19 RX ADMIN — PROPOFOL 30 MG: 10 INJECTION, EMULSION INTRAVENOUS at 09:16

## 2021-06-19 RX ADMIN — LIDOCAINE HYDROCHLORIDE 100 MG: 10 INJECTION, SOLUTION EPIDURAL; INFILTRATION; INTRACAUDAL; PERINEURAL at 09:15

## 2021-06-19 RX ADMIN — PROPOFOL 20 MG: 10 INJECTION, EMULSION INTRAVENOUS at 09:32

## 2021-06-19 RX ADMIN — PROPOFOL 100 MG: 10 INJECTION, EMULSION INTRAVENOUS at 09:15

## 2021-06-19 RX ADMIN — SODIUM CHLORIDE, SODIUM LACTATE, POTASSIUM CHLORIDE, AND CALCIUM CHLORIDE 125 ML/HR: .6; .31; .03; .02 INJECTION, SOLUTION INTRAVENOUS at 08:20

## 2021-06-19 RX ADMIN — PROPOFOL 20 MG: 10 INJECTION, EMULSION INTRAVENOUS at 09:30

## 2021-06-19 RX ADMIN — PROPOFOL 30 MG: 10 INJECTION, EMULSION INTRAVENOUS at 09:21

## 2021-06-19 RX ADMIN — PROPOFOL 30 MG: 10 INJECTION, EMULSION INTRAVENOUS at 09:24

## 2021-06-19 RX ADMIN — PROPOFOL 30 MG: 10 INJECTION, EMULSION INTRAVENOUS at 09:26

## 2021-06-19 RX ADMIN — PROPOFOL 20 MG: 10 INJECTION, EMULSION INTRAVENOUS at 09:31

## 2021-06-19 RX ADMIN — PROPOFOL 20 MG: 10 INJECTION, EMULSION INTRAVENOUS at 09:29

## 2021-06-19 RX ADMIN — PROPOFOL 20 MG: 10 INJECTION, EMULSION INTRAVENOUS at 09:17

## 2021-06-19 RX ADMIN — PROPOFOL 20 MG: 10 INJECTION, EMULSION INTRAVENOUS at 09:37

## 2021-06-19 RX ADMIN — PROPOFOL 30 MG: 10 INJECTION, EMULSION INTRAVENOUS at 09:28

## 2021-06-19 NOTE — H&P
History and Physical - [facility]  Magaly Saenz 70 y o  male MRN: [id number]                  HPI: Magaly Saenz is a 70y o  year old male who presents for EGD and colonoscopy for GERD, epigastric pain rectal bleeding, constipation, abnormal CT scan with diverticulitis, history of colon polyps  Last colonoscopy 8 years ago      REVIEW OF SYSTEMS: Per the HPI, and otherwise unremarkable  Historical Information   Past Medical History:   Diagnosis Date    Cancer [facility])     Lung Cancer    CHF (congestive heart failure) (HCC)     Chronic kidney disease     Colon polyp     Disease of thyroid gland     Fatty liver     Hyperlipidemia     Hypertension     Kidney stone     Nonischemic cardiomyopathy ([address] )     last assessed 17; resolved 16     Past Surgical History:   Procedure Laterality Date    CARDIAC CATHETERIZATION      his ablation    CATARACT EXTRACTION, BILATERAL Bilateral 10/01/2020    COLONOSCOPY      CYSTOSCOPY      LIPOMA RESECTION      LUNG CANCER SURGERY      ROTATOR CUFF REPAIR       Social History   Social History     Substance and Sexual Activity   Alcohol Use Yes    Alcohol/week: 6 0 standard drinks    Types: 6 Glasses of wine per week    Comment: with dinner     Social History     Substance and Sexual Activity   Drug Use No     Social History     Tobacco Use   Smoking Status Former Smoker    Packs/day: 1     Years: 20 00    Pack years: 20 00    Quit date: 1998    Years since quittin 5   Smokeless Tobacco Never Used     Family History   Adopted: Yes   Problem Relation Age of Onset    No Known Problems Mother     No Known Problems Father        Meds/Allergies     (Not in a hospital admission)      Allergies   Allergen Reactions    Pollen Extract Itching and Sneezing       Objective     Blood pressure 153/79, pulse 78, temperature 97 5 °F (36 4 °C), temperature source Temporal, resp   rate 17, height 5' 6" (1 676 m), weight 85 9 kg (189 lb 6 oz), SpO2 99 %        PHYSICAL EXAM    Gen: NAD  CV: RRR  CHEST: Clear  ABD: soft, NT/ND  EXT: no edema  Neuro: AAO      ASSESSMENT/PLAN:  This is a 70y o  year old male here for GERD, epigastric pain, rectal bleeding, constipation, abnormal CT, history colon polyps    PLAN:   Procedure:  EGD and colonoscopy

## 2021-06-19 NOTE — ANESTHESIA POSTPROCEDURE EVALUATION
Post-Op Assessment Note    CV Status:  Stable    Pain management: adequate     Mental Status:  Alert and awake   Hydration Status:  Euvolemic   PONV Controlled:  Controlled   Airway Patency:  Patent      Post Op Vitals Reviewed: Yes      Staff: Anesthesiologist   Comments: 127/88 sao2 97; airway patent        No complications documented      BP      Temp      Pulse     Resp      SpO2

## 2021-06-19 NOTE — ANESTHESIA PREPROCEDURE EVALUATION
Procedure:  COLONOSCOPY  EGD    Relevant Problems   CARDIO   (+) Atrial fibrillation (HCC)   (+) Congestive heart failure (HCC)   (+) Coronary artery disease   (+) High cholesterol   (+) Hypertension, essential      MUSCULOSKELETAL   (+) Chronic bilateral low back pain   (+) Inflammatory arthropathy   (+) 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HCC)   (+) Myopathy      NEURO/PSYCH   (+) Anxiety associated with depression      PULMONARY   (+) Chronic obstructive pulmonary disease (HCC)     CAD/PCI/MI/CHF -- CHF, CLASS 1-2 NYHA, EXERCISE DAILY  COPD/ASTHMA/JOHANNA -- DENIES  PROBS WITH PRIOR ANESTHESIA -- DENIES  NPO STATUS CONFIRMED    Lab Results   Component Value Date    WBC 11 74 (H) 2021    HGB 12 2 2021     2021     Lab Results   Component Value Date    SODIUM 143 2021    K 3 9 2021    BUN 20 2021    CREATININE 1 41 (H) 2021    EGFR 50 2021    GLUCOSE 104 2015     No results found for: PTT   Lab Results   Component Value Date    INR 1 00 2020         Lab Results   Component Value Date    HGBA1C 6 1 (H) 2020           Patient: Houston Benito  MR number: [id number]  Account number: [de-identified]  : 1950  Age: 71 years  Gender: Male  Status: Outpatient  [facility]: [facility]  Height: 65 in  Weight: 193 6 lb  BP: 150/ 74 mmHg     Indications: Cardiomyopathy      Diagnoses: I42 9 - Cardiomyopathy, unspecified     Sonographer:  Gonzalez RCS  Primary Physician:  Marco Ovalle MD  Referring Physician:  Ricco Mcgee MD  [facility]  [facility]  Interpreting Physician:  Diogenes Charles MD     SUMMARY     LEFT VENTRICLE:  The ventricle was dilated  Systolic function was moderately reduced  Ejection fraction was estimated in the range of 35 % to 40 %  There was moderate diffuse hypokinesis    Doppler parameters were consistent with abnormal left ventricular relaxation (grade 1 diastolic dysfunction)      RIGHT VENTRICLE:  The size was normal   Systolic function was normal      LEFT ATRIUM:  The atrium was mildly dilated      MITRAL VALVE:  There was mild regurgitation      AORTIC VALVE:  There was very mild stenosis      TRICUSPID VALVE:  There was trace regurgitation  Pulmonary artery systolic pressure was within the normal range      PULMONIC VALVE:  There was mild regurgitation  HEMODYNAMICS: Hemodynamic assessment demonstrated no systemic hypertension, no hypotension, and mildly elevated LVEDP (15 mmHg)     VALVES:  AORTIC VALVE:   --  The aortic valve was evaluated by hemodynamic measurement and fluoroscopy  There was no aortic stenosis      CORONARY VESSELS:   --  The coronary circulation is co-dominant  --  There was mild to moderate 1-vessel coronary artery disease ( 50 % RCA)  --  Left main: The vessel was normal sized  Angiography showed no evidence of disease  --  LAD: The vessel was large sized  Angiography showed minor luminal irregularities  There was one major diagonal branch  --  Circumflex: The vessel was large sized (co-dominant)  Angiography showed minor luminal irregularities  There was one major obtuse marginal   --  Ramus intermedius: The vessel was medium sized  Angiography showed mild atherosclerosis  --  Ostial ramus intermedius: There was a 30 % stenosis  --  RCA: The vessel was medium sized (co-dominant)  Angiography showed a single discrete lesion  --  Mid RCA: There was a 50 % stenosis  There was GREGG grade 3 flow through the vessel (brisk flow)      RECOMMENDATIONS  Patient management should include statin therapy  Patient to see primary care physician  Patient to see cardiology        Physical Exam    Airway    Mallampati score: II  TM Distance: >3 FB       Dental   No notable dental hx upper dentures and lower dentures,     Cardiovascular      Pulmonary      Other Findings        Anesthesia Plan  ASA Score- 3     Anesthesia Type- IV sedation with anesthesia with ASA Monitors  Additional Monitors:   Airway Plan:     Comment: KADI Ballard , have personally seen and evaluated the patient prior to anesthetic care  I have reviewed the pre-anesthetic record, and other medical records if appropriate to the anesthetic care  If a CRNA is involved in the case, I have reviewed the CRNA assessment, if present, and agree  Risks/benefits and alternatives discussed with patient including possible PONV, sore throat, and possibility of rare anesthetic and surgical emergencies          Plan Factors-    Chart reviewed  EKG reviewed  Imaging results reviewed  Existing labs reviewed  Patient summary reviewed  Patient is not a current smoker  Patient instructed to abstain from smoking on day of procedure  Obstructive sleep apnea risk education given perioperatively  Induction-     Postoperative Plan-     Informed Consent- Anesthetic plan and risks discussed with patient  I personally reviewed this patient with the CRNA  Discussed and agreed on the Anesthesia Plan with the CHARLI Diaz

## 2021-06-21 NOTE — TELEPHONE ENCOUNTER
Parmjit Garza, Can you please review and schedule this patient for Endoscopic US for antral submucosal nodule   Thank you

## 2021-07-14 ENCOUNTER — OFFICE VISIT (OUTPATIENT)
Dept: CARDIOLOGY CLINIC | Facility: CLINIC | Age: 71
End: 2021-07-14
Payer: MEDICARE

## 2021-07-14 VITALS
HEART RATE: 73 BPM | HEIGHT: 66 IN | SYSTOLIC BLOOD PRESSURE: 168 MMHG | OXYGEN SATURATION: 98 % | DIASTOLIC BLOOD PRESSURE: 90 MMHG | BODY MASS INDEX: 29.57 KG/M2 | WEIGHT: 184 LBS

## 2021-07-14 DIAGNOSIS — I42.9 CARDIOMYOPATHY, UNSPECIFIED TYPE (HCC): ICD-10-CM

## 2021-07-14 DIAGNOSIS — Z79.899 ON AMIODARONE THERAPY: ICD-10-CM

## 2021-07-14 DIAGNOSIS — Z79.01 CHRONIC ANTICOAGULATION: ICD-10-CM

## 2021-07-14 DIAGNOSIS — I48.0 PAF (PAROXYSMAL ATRIAL FIBRILLATION) (HCC): Primary | ICD-10-CM

## 2021-07-14 DIAGNOSIS — I10 HYPERTENSION, ESSENTIAL: ICD-10-CM

## 2021-07-14 DIAGNOSIS — I50.42 CHRONIC COMBINED SYSTOLIC AND DIASTOLIC CONGESTIVE HEART FAILURE (HCC): ICD-10-CM

## 2021-07-14 PROCEDURE — 99214 OFFICE O/P EST MOD 30 MIN: CPT | Performed by: INTERNAL MEDICINE

## 2021-07-14 RX ORDER — LISINOPRIL 20 MG/1
20 TABLET ORAL 2 TIMES DAILY
Qty: 60 TABLET | Refills: 5 | Status: SHIPPED | OUTPATIENT
Start: 2021-07-14 | End: 2022-02-01 | Stop reason: SDUPTHER

## 2021-07-14 RX ORDER — METOPROLOL TARTRATE 50 MG/1
TABLET, FILM COATED ORAL
Qty: 180 TABLET | Refills: 3
Start: 2021-07-14 | End: 2022-05-13 | Stop reason: SDUPTHER

## 2021-07-14 NOTE — PROGRESS NOTES
[facility] [address] [address]  Cardiology Follow Up    Gerber Gomes  1950  [id number]      1  PAF (paroxysmal atrial fibrillation) ([facility] [address] )     2  Chronic combined systolic and diastolic congestive heart failure ([facility] [address] )     3  Chronic anticoagulation     4  Cardiomyopathy, unspecified type [facility])         Chief Complaint   Patient presents with    Follow-up     weakness, no chest pain   took nitro   sob  Interval History:  Patient presents for follow-up visit  Patient has multiple medical problems including nonischemic cardiomyopathy with ejection fraction in the 40 to 45% range  Patient also has history of lung cancer status post radiation  Patient was recently admitted for pneumonia at [facility] and underwent bronchoscopy  Patient feels tired and weak  Patient also has history of paroxysmal atrial fibrillation on anticoagulation  He states that he has been compliant with all his present medications  No bleeding issues      Patient Active Problem List   Diagnosis    Anxiety associated with depression    Chronic obstructive pulmonary disease (HCC)    Congestive heart failure (HCC)    Coronary artery disease    High cholesterol    Hypertension, essential    Insomnia    Lyme borreliosis    Polyarthropathy    Neuropathy    Myopathy    Inflammatory arthropathy    Health care maintenance    Prostate cancer screening    Colon cancer screening    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[facility] [address] )    Malignant neoplasm of lower lobe of right lung (HCC)    Chronic bilateral low back pain    Protein-calorie malnutrition ([facility] [address] )    Puerperal sepsis with acute hypoxic respiratory failure without septic shock (HCC)    Atrial fibrillation ([address] )     Past Medical History:   Diagnosis Date    Cancer ([facility] [address] )     Lung Cancer    CHF (congestive heart failure) (HCC)     Chronic kidney disease     Colon polyp     Disease of thyroid gland     Fatty liver  Hyperlipidemia     Hypertension     Kidney stone     Nonischemic cardiomyopathy ([facility] [address] )     last assessed 17; resolved 16     Social History     Socioeconomic History    Marital status: /Civil Union     Spouse name: Not on file    Number of children: Not on file    Years of education: Not on file    Highest education level: Not on file   Occupational History    Occupation: retired   Tobacco Use    Smoking status: Former Smoker     Packs/day: 1 00     Years: 20 00     Pack years: 20 00     Quit date: 1998     Years since quittin 6    Smokeless tobacco: Never Used   Vaping Use    Vaping Use: Never used   Substance and Sexual Activity    Alcohol use: Yes     Alcohol/week: 6 0 standard drinks     Types: 6 Glasses of wine per week     Comment: with dinner    Drug use: No    Sexual activity: Yes     Partners: Male   Other Topics Concern    Not on file   Social History Narrative    Disabled        No advance directives     Social Determinants of Health     Financial Resource Strain:     Difficulty of Paying Living Expenses:    Food Insecurity:     Worried About Running Out of Food in the Last Year:     Ran Out of Food in the Last Year:    Transportation Needs:     Lack of Transportation (Medical):  Lack of Transportation (Non-Medical):    Physical Activity: Inactive    Days of Exercise per Week: 0 days    Minutes of Exercise per Session: 0 min   Stress: Stress Concern Present    Feeling of Stress : Rather much   Social Connections:     Frequency of Communication with Friends and Family:     Frequency of Social Gatherings with Friends and Family:     Attends Mosque Services:     Active Member of Clubs or Organizations:     Attends Club or Organization Meetings:     Marital Status:    Intimate Partner Violence:     Fear of Current or Ex-Partner:     Emotionally Abused:     Physically Abused:     Sexually Abused:       Family History   Adopted:  Yes Problem Relation Age of Onset    No Known Problems Mother     No Known Problems Father      Past Surgical History:   Procedure Laterality Date    CARDIAC CATHETERIZATION      his ablation    CATARACT EXTRACTION, BILATERAL Bilateral 10/01/2020    COLONOSCOPY      CYSTOSCOPY      LIPOMA RESECTION      LUNG CANCER SURGERY      ROTATOR CUFF REPAIR         Current Outpatient Medications:     amiodarone 200 mg tablet, Take 1 tablet (200 mg total) by mouth daily, Disp: 90 tablet, Rfl: 3    amLODIPine (NORVASC) 5 mg tablet, , Disp: , Rfl:     apixaban (ELIQUIS) 5 mg, Take 1 tablet (5 mg total) by mouth 2 (two) times a day, Disp: 60 tablet, Rfl: 11    atorvastatin (LIPITOR) 10 mg tablet, Take 1 tablet (10 mg total) by mouth daily, Disp: 90 tablet, Rfl: 3    donepezil (ARICEPT) 10 mg tablet, Take 1 tablet (10 mg total) by mouth daily at bedtime, Disp: 90 tablet, Rfl: 3    fluticasone (FLONASE) 50 mcg/act nasal spray, 1 spray into each nostril daily, Disp: 1 Bottle, Rfl: 3    gabapentin (NEURONTIN) 100 mg capsule, Take 1 capsule (100 mg total) by mouth 2 (two) times a day, Disp: 180 capsule, Rfl: 3    levothyroxine 75 mcg tablet, Take 1 tablet (75 mcg total) by mouth daily, Disp: 90 tablet, Rfl: 3    lisinopril (ZESTRIL) 10 mg tablet, Take 10 mg by mouth 2 (two) times a day, Disp: , Rfl:     metoprolol tartrate (LOPRESSOR) 50 mg tablet, Take 1 tablet (50 mg total) by mouth 2 (two) times a day, Disp: 180 tablet, Rfl: 3    montelukast (SINGULAIR) 10 mg tablet, Take 1 tablet (10 mg total) by mouth daily, Disp: 90 tablet, Rfl: 3    nitroglycerin (Nitrostat) 0 4 mg SL tablet, Place 1 tablet (0 4 mg total) under the tongue every 5 (five) minutes as needed for chest pain, Disp: 25 tablet, Rfl: 3    pantoprazole (PROTONIX) 40 mg tablet, Take 1 tablet (40 mg total) by mouth daily, Disp: 30 tablet, Rfl: 1    predniSONE 5 mg tablet, Take by mouth as needed , Disp: , Rfl:     rOPINIRole (REQUIP) 0 5 mg tablet, Take 1 tablet (0 5 mg total) by mouth daily at bedtime, Disp: 90 tablet, Rfl: 3    tamsulosin (FLOMAX) 0 4 mg, Take 1 capsule (0 4 mg total) by mouth daily with dinner, Disp: 90 capsule, Rfl: 3    Trelegy Ellipta 100-62 5-25 MCG/INH inhaler, Inhale 1 puff daily, Disp: 1 Inhaler, Rfl: 5    albuterol (PROVENTIL HFA,VENTOLIN HFA) 90 mcg/act inhaler, Inhale 1 puff every 4 (four) hours as needed for wheezing (Patient not taking: Reported on 7/14/2021), Disp: 1 Inhaler, Rfl: 6    Fluticasone Furoate-Vilanterol (BREO ELLIPTA IN), Inhale daily (Patient not taking: Reported on 7/14/2021), Disp: , Rfl:     Levothyroxine Sodium 75 MCG CAPS, Take 1 capsule (75 mcg total) by mouth daily (Patient not taking: Reported on 7/14/2021), Disp: 90 capsule, Rfl: 3    Prolensa 0 07 % SOLN, Administer to both eyes 2 (two) times a day , Disp: , Rfl:     senna (SENOKOT) 8 6 MG tablet, Take 1 tablet by mouth daily, Disp: , Rfl:   Allergies   Allergen Reactions    Pollen Extract Itching and Sneezing       Labs:  Hospital Outpatient Visit on 06/19/2021   Component Date Value    Case Report 06/19/2021                      Value:Surgical Pathology Report                         Case: [id number]                                   Authorizing Provider:  Aarti Malloy MD      Collected:           06/19/2021 0921              Ordering Location:      [facility]       Received:            06/19/2021 [facility]                                                             Pathologist:           Nelson Aleman MD                                                              Specimens:   A) - Stomach, antrum                                                                                B) - Stomach, body                                                                                  C) - Stomach, fundus                                                                                D) - Esophagus, distal                                                                              E) - Esophagus, proximal                                                                                                      F) - Polyp, Colorectal, mid transverse x5                                                           G) - Polyp, Colorectal, cecum x 3                                                          Addendum 06/19/2021                      Value: This result contains rich text formatting which cannot be displayed here   Final Diagnosis 06/19/2021                      Value: This result contains rich text formatting which cannot be displayed here   Note 06/19/2021                      Value: This result contains rich text formatting which cannot be displayed here   Additional Information 06/19/2021                      Value: This result contains rich text formatting which cannot be displayed here  Dylan Jannie Gross Description 06/19/2021                      Value: This result contains rich text formatting which cannot be displayed here   Clinical Information 06/19/2021                      Value:Cold biopsies r/o H Pylori     Imaging: EGD    Result Date: 6/19/2021  Narrative:  [id number] [facility] [address] [facility] [address] [phone] DATE OF SERVICE: 6/19/21 PHYSICIAN(S): Kaiser Mcarthur MD - Attending Physician INDICATION: Epigastric pain, GERD POST-OP DIAGNOSIS: See the impression below  PREPROCEDURE: Informed consent was obtained for the procedure, including sedation  Risks of perforation, hemorrhage, adverse drug reaction and aspiration were discussed  The patient was placed in the left lateral decubitus position  Patient was explained about the risks and benefits of the procedure  Risks including but not limited to bleeding, infection, and perforation were explained in detail  Also explained about less than 100% sensitivity with the exam and other alternatives  DETAILS OF PROCEDURE: Patient was taken to the procedure room where a time out was performed to confirm correct patient and correct procedure  The patient underwent monitored anesthesia care, which was administered by an anesthesia professional  The patient's blood pressure, heart rate, level of consciousness, respirations and oxygen were monitored throughout the procedure  The scope was advanced to the third part of the duodenum  Retroflexion was performed in the cardia, fundus and incisura  The patient experienced no blood loss  The procedure was not difficult  The patient tolerated the procedure well  There were no apparent complications  ANESTHESIA INFORMATION: ASA: III Anesthesia Type: IV Sedation with Anesthesia MEDICATIONS: No administrations occurring from 0912 to 0920 on 06/19/21 FINDINGS: Single small, submucosal nodule in the antrum The upper third of the esophagus, middle third of the esophagus, lower third of the esophagus, GE junction, Z-line, cardia, fundus of the stomach, body of the stomach, incisura, prepyloric region, pylorus, duodenal bulb, 2nd part of the duodenum and 3rd part of the duodenum appeared normal  Performed biopsies in the upper third of the esophagus, lower third of the esophagus, cardia, fundus of the stomach and antrum SPECIMENS: * No specimens in log *     Impression: Antral submucosal nodule  Otherwise normal EGD, status post gastric biopsies RECOMMENDATION: Refer for endoscopic ultrasound Follow-up biopsy results in 1 week  Meghan Kumari MD     Colonoscopy    Result Date: 6/19/2021  Narrative:  [facility] [address] [address] [phone] DATE OF SERVICE: 6/19/21 PHYSICIAN(S): Meghan Kumari MD - Attending Physician INDICATION: Rectal bleeding, constipation, abnormal CT, family history of colon cancer Colonoscopy performed for a diagnostic indication  POST-OP DIAGNOSIS: See the impression below   HISTORY: Prior colonoscopy: 8 years ago  BOWEL PREPARATION: Biscodyl tablets; Polyethylene glycol solution (Prevue) PREPROCEDURE: Informed consent was obtained for the procedure, including sedation  Risks including but not limited to bleeding, infection, perforation, adverse drug reaction and aspiration were explained in detail  Also explained about less than 100% sensitivity with the exam and other alternatives  The patient was placed in the left lateral decubitus position  DETAILS OF PROCEDURE: Patient was taken to the procedure room where a time out was performed to confirm correct patient and correct procedure  The patient underwent monitored anesthesia care, which was administered by an anesthesia professional  The patient's blood pressure, heart rate, level of consciousness, oxygen and respirations were monitored throughout the procedure  A digital rectal exam was performed  The scope was introduced through the anus and advanced to the cecum  Retroflexion was performed in the rectum  The quality of bowel preparation was evaluated using the [facility] Bowel Preparation Scale with scores of: right colon = 2, transverse colon = 2, left colon = 2  The total BBPS score was 6  Bowel prep was adequate  The patient's estimated blood loss was minimal (<5 mL)  The procedure was not difficult  The patient tolerated the procedure well  There were no apparent complications   ANESTHESIA INFORMATION: ASA: III Anesthesia Type: IV Sedation with Anesthesia MEDICATIONS: No administrations occurring from 0912 to 0944 on 06/19/21 FINDINGS: Three sessile polyps measuring from 2 mm up to 5 mm in the cecum; completely removed en bloc by cold snare and retrieved specimen Five sessile polyps measuring up to 6 mm in the mid transverse colon; completely removed en bloc by cold snare and retrieved specimen Multiple small and large severe pancolonic diverticula EVENTS: Procedure Events Event Event Time ENDO CECUM REACHED 6/19/2021  9:36 AM SPECIMENS: ID Type Source Tests Collected by Time Destination 1 : antrum Tissue Stomach TISSUE EXAM Kinza Momin MD 6/19/2021  9:21 AM  2 : body Tissue Stomach TISSUE EXAM Kinza Momin MD 6/19/2021  9:22 AM  3 : fundus Tissue Stomach TISSUE EXAM Kinza Momin MD 6/19/2021  9:22 AM  4 : distal Tissue Esophagus TISSUE EXAM Kinza Momin MD 6/19/2021  9:23 AM  5 : proximal Tissue Esophagus TISSUE EXAM Kinza Momin MD 6/19/2021  9:23 AM  6 : mid transverse x5 Tissue Polyp, Colorectal TISSUE EXAM Kinza Momin MD 6/19/2021  9:43 AM  7 : cecum x 3 Tissue Polyp, Colorectal TISSUE EXAM Kinza Momin MD 6/19/2021  9:43 AM  EQUIPMENT: [id number]     Impression: 1  3 cecal polyps status post cold snare polypectomies 2  5 mid transverse polyps, status post cold snare polypectomies 3  Severe pancolonic diverticulosis RECOMMENDATION: Repeat colonoscopy in 3 years due to a personal history of colon polyps  , family history of colon cancer Follow-up biopsy results in 1 week Kinza Momin MD       Review of Systems:  Review of Systems     REVIEW OF SYSTEMS:  Constitutional:  Denies fever or chills   Eyes:  Denies change in visual acuity   HENT:  Denies nasal congestion or sore throat   Respiratory:   shortness of breath   Cardiovascular:  Denies chest pain or edema   GI:  Denies abdominal pain, nausea, vomiting, bloody stools or diarrhea   :  Denies dysuria, frequency, difficulty in micturition and nocturia  Musculoskeletal:  Denies back pain or joint pain   Neurologic:  Denies headache, focal weakness or sensory changes   Endocrine:  Denies polyuria or polydipsia   Lymphatic:  Denies swollen glands   Psychiatric:  anxiety     Physical Exam:    /90 (BP Location: Left arm, Patient Position: Sitting, Cuff Size: Standard)   Pulse 73   Ht 5' 6" (1 676 m)   Wt 83 5 kg (184 lb)   SpO2 98%   BMI 29 70 kg/m²     Physical Exam   PHYSICAL EXAM:  General:  Patient is not in acute distress   Head:

## 2021-08-07 ENCOUNTER — APPOINTMENT (OUTPATIENT)
Dept: LAB | Facility: CLINIC | Age: 71
End: 2021-08-07
Payer: MEDICARE

## 2021-08-07 DIAGNOSIS — Z11.59 NEED FOR HEPATITIS C SCREENING TEST: ICD-10-CM

## 2021-08-07 DIAGNOSIS — J44.9 CHRONIC OBSTRUCTIVE PULMONARY DISEASE, UNSPECIFIED COPD TYPE (HCC): ICD-10-CM

## 2021-08-07 DIAGNOSIS — I50.42 CHRONIC COMBINED SYSTOLIC AND DIASTOLIC CONGESTIVE HEART FAILURE (HCC): ICD-10-CM

## 2021-08-07 DIAGNOSIS — E03.9 ACQUIRED HYPOTHYROIDISM: ICD-10-CM

## 2021-08-07 LAB
ALBUMIN SERPL BCP-MCNC: 2.8 G/DL (ref 3.5–5)
ALP SERPL-CCNC: 74 U/L (ref 46–116)
ALT SERPL W P-5'-P-CCNC: 23 U/L (ref 12–78)
ANION GAP SERPL CALCULATED.3IONS-SCNC: 6 MMOL/L (ref 4–13)
AST SERPL W P-5'-P-CCNC: 13 U/L (ref 5–45)
BILIRUB SERPL-MCNC: 0.31 MG/DL (ref 0.2–1)
BUN SERPL-MCNC: 20 MG/DL (ref 5–25)
CALCIUM ALBUM COR SERPL-MCNC: 10.1 MG/DL (ref 8.3–10.1)
CALCIUM SERPL-MCNC: 9.1 MG/DL (ref 8.3–10.1)
CHLORIDE SERPL-SCNC: 112 MMOL/L (ref 100–108)
CHOLEST SERPL-MCNC: 187 MG/DL (ref 50–200)
CO2 SERPL-SCNC: 23 MMOL/L (ref 21–32)
CREAT SERPL-MCNC: 1.51 MG/DL (ref 0.6–1.3)
D DIMER PPP FEU-MCNC: 1.27 UG/ML FEU
GFR SERPL CREATININE-BSD FRML MDRD: 46 ML/MIN/1.73SQ M
GLUCOSE SERPL-MCNC: 124 MG/DL (ref 65–140)
HCV AB SER QL: NORMAL
HDLC SERPL-MCNC: 57 MG/DL
LDLC SERPL CALC-MCNC: 102 MG/DL (ref 0–100)
NONHDLC SERPL-MCNC: 130 MG/DL
NT-PROBNP SERPL-MCNC: 4335 PG/ML
POTASSIUM SERPL-SCNC: 3.6 MMOL/L (ref 3.5–5.3)
PROT SERPL-MCNC: 6.9 G/DL (ref 6.4–8.2)
SODIUM SERPL-SCNC: 141 MMOL/L (ref 136–145)
TRIGL SERPL-MCNC: 141 MG/DL
TSH SERPL DL<=0.05 MIU/L-ACNC: 2.62 UIU/ML (ref 0.36–3.74)

## 2021-08-07 PROCEDURE — 83880 ASSAY OF NATRIURETIC PEPTIDE: CPT

## 2021-08-07 PROCEDURE — 80061 LIPID PANEL: CPT

## 2021-08-07 PROCEDURE — 36415 COLL VENOUS BLD VENIPUNCTURE: CPT | Performed by: INTERNAL MEDICINE

## 2021-08-07 PROCEDURE — 84443 ASSAY THYROID STIM HORMONE: CPT | Performed by: INTERNAL MEDICINE

## 2021-08-07 PROCEDURE — 85379 FIBRIN DEGRADATION QUANT: CPT

## 2021-08-07 PROCEDURE — 86803 HEPATITIS C AB TEST: CPT

## 2021-08-07 PROCEDURE — 80053 COMPREHEN METABOLIC PANEL: CPT | Performed by: INTERNAL MEDICINE

## 2021-08-19 ENCOUNTER — OFFICE VISIT (OUTPATIENT)
Dept: PAIN MEDICINE | Facility: CLINIC | Age: 71
End: 2021-08-19
Payer: MEDICARE

## 2021-08-19 VITALS
HEIGHT: 66 IN | HEART RATE: 70 BPM | BODY MASS INDEX: 30.37 KG/M2 | DIASTOLIC BLOOD PRESSURE: 82 MMHG | WEIGHT: 189 LBS | SYSTOLIC BLOOD PRESSURE: 149 MMHG

## 2021-08-19 DIAGNOSIS — M54.16 LUMBAR RADICULOPATHY: Primary | ICD-10-CM

## 2021-08-19 PROBLEM — M35.3 PMR (POLYMYALGIA RHEUMATICA) (HCC): Status: ACTIVE | Noted: 2021-08-19

## 2021-08-19 PROCEDURE — 99214 OFFICE O/P EST MOD 30 MIN: CPT | Performed by: STUDENT IN AN ORGANIZED HEALTH CARE EDUCATION/TRAINING PROGRAM

## 2021-08-19 PROCEDURE — 1124F ACP DISCUSS-NO DSCNMKR DOCD: CPT | Performed by: STUDENT IN AN ORGANIZED HEALTH CARE EDUCATION/TRAINING PROGRAM

## 2021-08-19 NOTE — PROGRESS NOTES
Pain Medicine Follow-Up Note    Assessment:  1  Lumbar radiculopathy        Plan:  Orders Placed This Encounter   Procedures    FL spine and pain procedure     Standing Status:   Future     Standing Expiration Date:   8/19/2025     Order Specific Question:   Reason for Exam:     Answer:   Right parasaggital L5-S1 LESI     Order Specific Question:   Anticoagulant hold needed? Answer:   Demetria Deras       No orders of the defined types were placed in this encounter  My impressions and treatment recommendations were discussed in detail with the patient who verbalized understanding and had no further questions  This is a 60-year-old male who returns to our office with chief complaint of right lower extremity radiculopathy  He has bilateral dorsiflexion weakness, right greater than left  This in the setting of severe bilateral foraminal stenosis on MRI  He had significant relief with L5-S1 epidural steroid injection for about 6 months  Symptoms have returned in the same fashion  Given previous relief, we will repeat the procedure  Discussed that injection without help with weakness  We will reassess after repeat injection  Discussed he may need referral to orthopedic spine surgery  [facility] Prescription Drug Monitoring Program report was reviewed and was appropriate       Complete risks and benefits including bleeding, infection, tissue reaction, nerve injury and allergic reaction were discussed  The approach was demonstrated using models and literature was provided  Verbal and written consent was obtained  Discharge instructions were provided  I personally saw and examined the patient and I agree with the above discussed plan of care  History of Present Illness:    Irma Childers is a 70 y o  male who presents to [facility] for interval re-evaluation of the above stated pain complaints   The patient has a past medical and chronic pain history as outlined in the assessment section  He was last seen on 03/15/2021 at which time he underwent L5-S1 lumbar epidural steroid injection  Reports 90% improvement for about 6 months  Has return of the same symptoms of pain starting in the back and radiating down the right lower extremity  Also continues to report weakness in the bilateral lower extremities  He is seeking repeat injection  Pain is worse in the morning, evening, and night  Pain is constant  Pain is burning, sharp, numbness  Pain is right now 10/10  Esther Monreal Other than as stated above, the patient denies any interval changes in medications, medical condition, mental condition, symptoms, or allergies since the last office visit  Review of Systems:    Review of Systems   Respiratory: Negative for shortness of breath  Cardiovascular: Negative for chest pain  Gastrointestinal: Negative for constipation, diarrhea, nausea and vomiting  Musculoskeletal: Positive for myalgias  Negative for arthralgias, gait problem and joint swelling  Skin: Negative for rash  Neurological: Negative for dizziness, seizures and weakness  All other systems reviewed and are negative          Patient Active Problem List   Diagnosis    Anxiety associated with depression    [facility]    Congestive heart failure [facility]    Coronary artery disease    High cholesterol    Hypertension, essential    Insomnia    Lyme borreliosis    Polyarthropathy    Neuropathy    Myopathy    Inflammatory arthropathy    Health care maintenance    Prostate cancer screening    Colon cancer screening    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[facility]    Malignant neoplasm of lower lobe of right lung (HCC)    Chronic bilateral low back pain    Protein-calorie malnutrition (HCC)    Puerperal sepsis with acute hypoxic respiratory failure without septic shock (HCC)    Atrial fibrillation (HCC)    PMR (polymyalgia rheumatica) (HCC)       Past Medical History:   Diagnosis Date    Cancer ([facility] )     Lung Cancer    CHF (congestive heart failure) (HCC)     Chronic kidney disease     Colon polyp     Disease of thyroid gland     Fatty liver     Hyperlipidemia     Hypertension     Kidney stone     Nonischemic cardiomyopathy ([facility] )     last assessed 17; resolved 16       Past Surgical History:   Procedure Laterality Date    CARDIAC CATHETERIZATION      his ablation    CATARACT EXTRACTION, BILATERAL Bilateral 10/01/2020    COLONOSCOPY      CYSTOSCOPY      LIPOMA RESECTION      LUNG CANCER SURGERY      ROTATOR CUFF REPAIR         Family History   Adopted: Yes   Problem Relation Age of Onset    No Known Problems Mother     No Known Problems Father        Social History     Occupational History    Occupation: retired   Tobacco Use    Smoking status: Former Smoker     Packs/day:      Years: 20      Pack years: 20      Quit date: 1998     Years since quittin 7    Smokeless tobacco: Never Used   Vaping Use    Vaping Use: Never used   Substance and Sexual Activity    Alcohol use:  Yes     Alcohol/week: 6 0 standard drinks     Types: 6 Glasses of wine per week     Comment: with dinner    Drug use: No    Sexual activity: Yes     Partners: Male         Current Outpatient Medications:     amiodarone 200 mg tablet, Take 1 tablet (200 mg total) by mouth daily, Disp: 90 tablet, Rfl: 3    amLODIPine (NORVASC) 5 mg tablet, , Disp: , Rfl:     apixaban (ELIQUIS) 5 mg, Take 1 tablet (5 mg total) by mouth 2 (two) times a day, Disp: 60 tablet, Rfl: 11    atorvastatin (LIPITOR) 10 mg tablet, Take 1 tablet (10 mg total) by mouth daily, Disp: 90 tablet, Rfl: 3    fluticasone (FLONASE) 50 mcg/act nasal spray, 1 spray into each nostril daily, Disp: 1 Bottle, Rfl: 3    gabapentin (NEURONTIN) 100 mg capsule, Take 1 capsule (100 mg total) by mouth 2 (two) times a day, Disp: 180 capsule, Rfl: 3    lisinopril (ZESTRIL) 20 mg tablet, Take 1 tablet (20 mg total) by mouth 2 (two) times a day, Disp: 60 tablet, Rfl: 5    metoprolol tartrate (LOPRESSOR) 50 mg tablet, 1/2 tab twice a day, Disp: 180 tablet, Rfl: 3    nitroglycerin (Nitrostat) 0 4 mg SL tablet, Place 1 tablet (0 4 mg total) under the tongue every 5 (five) minutes as needed for chest pain, Disp: 25 tablet, Rfl: 3    pantoprazole (PROTONIX) 40 mg tablet, Take 1 tablet (40 mg total) by mouth daily, Disp: 30 tablet, Rfl: 1    predniSONE 5 mg tablet, Take by mouth as needed , Disp: , Rfl:     rOPINIRole (REQUIP) 0 5 mg tablet, Take 1 tablet (0 5 mg total) by mouth daily at bedtime, Disp: 90 tablet, Rfl: 3    Trelegy Ellipta 100-62 5-25 MCG/INH inhaler, Inhale 1 puff daily, Disp: 1 Inhaler, Rfl: 5    albuterol (PROVENTIL HFA,VENTOLIN HFA) 90 mcg/act inhaler, Inhale 1 puff every 4 (four) hours as needed for wheezing (Patient not taking: Reported on 7/14/2021), Disp: 1 Inhaler, Rfl: 6    donepezil (ARICEPT) 10 mg tablet, Take 1 tablet (10 mg total) by mouth daily at bedtime, Disp: 90 tablet, Rfl: 3    levothyroxine 75 mcg tablet, Take 1 tablet (75 mcg total) by mouth daily, Disp: 90 tablet, Rfl: 3    Levothyroxine Sodium 75 MCG CAPS, Take 1 capsule (75 mcg total) by mouth daily (Patient not taking: Reported on 7/14/2021), Disp: 90 capsule, Rfl: 3    montelukast (SINGULAIR) 10 mg tablet, Take 1 tablet (10 mg total) by mouth daily, Disp: 90 tablet, Rfl: 3    tamsulosin (FLOMAX) 0 4 mg, Take 1 capsule (0 4 mg total) by mouth daily with dinner, Disp: 90 capsule, Rfl: 3    Allergies   Allergen Reactions    Pollen Extract Itching and Sneezing       Physical Exam:    /82   Pulse 70   Ht 5' 6" (1 676 m)   Wt 85 7 kg (189 lb)   BMI 30 51 kg/m²     Constitutional:normal, well developed, well nourished, alert, in no distress and non-toxic and no overt pain behavior    Eyes:anicteric  HEENT:grossly intact  Neck:supple, symmetric, trachea midline and no masses   Pulmonary:even and unlabored  Cardiovascular:No edema or pitting edema present  Skin:Normal without rashes or lesions and well hydrated  Psychiatric:Mood and affect appropriate  Neurologic:Cranial Nerves II-XII grossly intact strength intact in bilateral lower extremity myotomes except for 4 in 5 dorsiflexion bilaterally    Musculoskeletal:normal      Imaging  FL spine and pain procedure    (Results Pending)         Orders Placed This Encounter   Procedures    FL spine and pain procedure

## 2021-08-19 NOTE — H&P (VIEW-ONLY)
Pain Medicine Follow-Up Note    Assessment:  1  Lumbar radiculopathy        Plan:  Orders Placed This Encounter   Procedures    FL spine and pain procedure     Standing Status:   Future     Standing Expiration Date:   8/19/2025     Order Specific Question:   Reason for Exam:     Answer:   Right parasaggital L5-S1 LESI     Order Specific Question:   Anticoagulant hold needed? Answer:   Eber Franco       No orders of the defined types were placed in this encounter  My impressions and treatment recommendations were discussed in detail with the patient who verbalized understanding and had no further questions  This is a 77-year-old male who returns to our office with chief complaint of right lower extremity radiculopathy  He has bilateral dorsiflexion weakness, right greater than left  This in the setting of severe bilateral foraminal stenosis on MRI  He had significant relief with L5-S1 epidural steroid injection for about 6 months  Symptoms have returned in the same fashion  Given previous relief, we will repeat the procedure  Discussed that injection without help with weakness  We will reassess after repeat injection  Discussed he may need referral to orthopedic spine surgery  [place] Prescription Drug Monitoring Program report was reviewed and was appropriate       Complete risks and benefits including bleeding, infection, tissue reaction, nerve injury and allergic reaction were discussed  The approach was demonstrated using models and literature was provided  Verbal and written consent was obtained  Discharge instructions were provided  I personally saw and examined the patient and I agree with the above discussed plan of care  History of Present Illness:    Jaun Rodriguez is a 70 y o  male who presents to [facility] for interval re-evaluation of the above stated pain complaints   The patient has a past medical and chronic pain history as outlined in the assessment section  He was last seen on 03/15/2021 at which time he underwent L5-S1 lumbar epidural steroid injection  Reports 90% improvement for about 6 months  Has return of the same symptoms of pain starting in the back and radiating down the right lower extremity  Also continues to report weakness in the bilateral lower extremities  He is seeking repeat injection  Pain is worse in the morning, evening, and night  Pain is constant  Pain is burning, sharp, numbness  Pain is right now 10/10  Hal Mala Other than as stated above, the patient denies any interval changes in medications, medical condition, mental condition, symptoms, or allergies since the last office visit  Review of Systems:    Review of Systems   Respiratory: Negative for shortness of breath  Cardiovascular: Negative for chest pain  Gastrointestinal: Negative for constipation, diarrhea, nausea and vomiting  Musculoskeletal: Positive for myalgias  Negative for arthralgias, gait problem and joint swelling  Skin: Negative for rash  Neurological: Negative for dizziness, seizures and weakness  All other systems reviewed and are negative          Patient Active Problem List   Diagnosis    Anxiety associated with depression    [facility]    Congestive heart failure [facility]    Coronary artery disease    High cholesterol    Hypertension, essential    Insomnia    Lyme borreliosis    Polyarthropathy    Neuropathy    Myopathy    Inflammatory arthropathy    Health care maintenance    Prostate cancer screening    Colon cancer screening    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[facility]    Malignant neoplasm of lower lobe of right lung (HCC)    Chronic bilateral low back pain    Protein-calorie malnutrition (HCC)    Puerperal sepsis with acute hypoxic respiratory failure without septic shock (HCC)    Atrial fibrillation (HCC)    PMR (polymyalgia rheumatica) (HCC)       Past Medical History:   Diagnosis Date    Cancer ([facility] )     Lung Cancer    CHF (congestive heart failure) (HCC)     Chronic kidney disease     Colon polyp     Disease of thyroid gland     Fatty liver     Hyperlipidemia     Hypertension     Kidney stone     Nonischemic cardiomyopathy ([facility] )     last assessed 17; resolved 16       Past Surgical History:   Procedure Laterality Date    CARDIAC CATHETERIZATION      his ablation    CATARACT EXTRACTION, BILATERAL Bilateral 10/01/2020    COLONOSCOPY      CYSTOSCOPY      LIPOMA RESECTION      LUNG CANCER SURGERY      ROTATOR CUFF REPAIR         Family History   Adopted: Yes   Problem Relation Age of Onset    No Known Problems Mother     No Known Problems Father        Social History     Occupational History    Occupation: retired   Tobacco Use    Smoking status: Former Smoker     Packs/day:      Years: 20      Pack years: 20      Quit date: 1998     Years since quittin 7    Smokeless tobacco: Never Used   Vaping Use    Vaping Use: Never used   Substance and Sexual Activity    Alcohol use:  Yes     Alcohol/week: 6 0 standard drinks     Types: 6 Glasses of wine per week     Comment: with dinner    Drug use: No    Sexual activity: Yes     Partners: Male         Current Outpatient Medications:     amiodarone 200 mg tablet, Take 1 tablet (200 mg total) by mouth daily, Disp: 90 tablet, Rfl: 3    amLODIPine (NORVASC) 5 mg tablet, , Disp: , Rfl:     apixaban (ELIQUIS) 5 mg, Take 1 tablet (5 mg total) by mouth 2 (two) times a day, Disp: 60 tablet, Rfl: 11    atorvastatin (LIPITOR) 10 mg tablet, Take 1 tablet (10 mg total) by mouth daily, Disp: 90 tablet, Rfl: 3    fluticasone (FLONASE) 50 mcg/act nasal spray, 1 spray into each nostril daily, Disp: 1 Bottle, Rfl: 3    gabapentin (NEURONTIN) 100 mg capsule, Take 1 capsule (100 mg total) by mouth 2 (two) times a day, Disp: 180 capsule, Rfl: 3    lisinopril (ZESTRIL) 20 mg tablet, Take 1 tablet (20 mg total) by mouth 2 (two) times a day, Disp: 60 tablet, Rfl: 5    metoprolol tartrate (LOPRESSOR) 50 mg tablet, 1/2 tab twice a day, Disp: 180 tablet, Rfl: 3    nitroglycerin (Nitrostat) 0 4 mg SL tablet, Place 1 tablet (0 4 mg total) under the tongue every 5 (five) minutes as needed for chest pain, Disp: 25 tablet, Rfl: 3    pantoprazole (PROTONIX) 40 mg tablet, Take 1 tablet (40 mg total) by mouth daily, Disp: 30 tablet, Rfl: 1    predniSONE 5 mg tablet, Take by mouth as needed , Disp: , Rfl:     rOPINIRole (REQUIP) 0 5 mg tablet, Take 1 tablet (0 5 mg total) by mouth daily at bedtime, Disp: 90 tablet, Rfl: 3    Trelegy Ellipta 100-62 5-25 MCG/INH inhaler, Inhale 1 puff daily, Disp: 1 Inhaler, Rfl: 5    albuterol (PROVENTIL HFA,VENTOLIN HFA) 90 mcg/act inhaler, Inhale 1 puff every 4 (four) hours as needed for wheezing (Patient not taking: Reported on 7/14/2021), Disp: 1 Inhaler, Rfl: 6    donepezil (ARICEPT) 10 mg tablet, Take 1 tablet (10 mg total) by mouth daily at bedtime, Disp: 90 tablet, Rfl: 3    levothyroxine 75 mcg tablet, Take 1 tablet (75 mcg total) by mouth daily, Disp: 90 tablet, Rfl: 3    Levothyroxine Sodium 75 MCG CAPS, Take 1 capsule (75 mcg total) by mouth daily (Patient not taking: Reported on 7/14/2021), Disp: 90 capsule, Rfl: 3    montelukast (SINGULAIR) 10 mg tablet, Take 1 tablet (10 mg total) by mouth daily, Disp: 90 tablet, Rfl: 3    tamsulosin (FLOMAX) 0 4 mg, Take 1 capsule (0 4 mg total) by mouth daily with dinner, Disp: 90 capsule, Rfl: 3    Allergies   Allergen Reactions    Pollen Extract Itching and Sneezing       Physical Exam:    /82   Pulse 70   Ht 5' 6" (1 676 m)   Wt 85 7 kg (189 lb)   BMI 30 51 kg/m²     Constitutional:normal, well developed, well nourished, alert, in no distress and non-toxic and no overt pain behavior    Eyes:anicteric  HEENT:grossly intact  Neck:supple, symmetric, trachea midline and no masses   Pulmonary:even and unlabored  Cardiovascular:No edema or pitting edema present  Skin:Normal without rashes or lesions and well hydrated  Psychiatric:Mood and affect appropriate  Neurologic:Cranial Nerves II-XII grossly intact strength intact in bilateral lower extremity myotomes except for 4 in 5 dorsiflexion bilaterally    Musculoskeletal:normal      Imaging  FL spine and pain procedure    (Results Pending)         Orders Placed This Encounter   Procedures    FL spine and pain procedure

## 2021-08-20 ENCOUNTER — TELEPHONE (OUTPATIENT)
Dept: RADIOLOGY | Facility: CLINIC | Age: 71
End: 2021-08-20

## 2021-08-31 NOTE — TELEPHONE ENCOUNTER
S/W pt and scheduled Right parasaggital L5-S1 LESI on 9/9/21 at [address] 9/5/21  Advised  needed  Nothing to eat or drink one hour prior  Loose fitting comfortable pants without and belts/zippers  Denies COVID or COVID symptoms  Denies contact with COVID positive person  States had COVID Booster on 8/24/21 and reminded not to get any others 2 weeks prior or 2 weeks after    Advised to call and cancel if sick or put on ABX for any infection  CB from now until procedure with any questions

## 2021-09-07 NOTE — TELEPHONE ENCOUNTER
Rescheduled with wife for 3pm 9/14  Pt aware to resume blood thinner and last dose will be 9/10 and may resume post procedure 9/14

## 2021-09-14 ENCOUNTER — HOSPITAL ENCOUNTER (OUTPATIENT)
Dept: RADIOLOGY | Facility: CLINIC | Age: 71
Discharge: HOME | End: 2021-09-14
Attending: STUDENT IN AN ORGANIZED HEALTH CARE EDUCATION/TRAINING PROGRAM | Admitting: STUDENT IN AN ORGANIZED HEALTH CARE EDUCATION/TRAINING PROGRAM
Payer: MEDICARE

## 2021-09-14 VITALS
DIASTOLIC BLOOD PRESSURE: 98 MMHG | TEMPERATURE: 97 F | SYSTOLIC BLOOD PRESSURE: 155 MMHG | OXYGEN SATURATION: 98 % | HEART RATE: 67 BPM | RESPIRATION RATE: 18 BRPM

## 2021-09-14 DIAGNOSIS — M54.16 LUMBAR RADICULOPATHY: ICD-10-CM

## 2021-09-14 PROCEDURE — 62323 NJX INTERLAMINAR LMBR/SAC: CPT | Performed by: STUDENT IN AN ORGANIZED HEALTH CARE EDUCATION/TRAINING PROGRAM

## 2021-09-14 RX ORDER — METHYLPREDNISOLONE ACETATE 80 MG/ML
80 INJECTION, SUSPENSION INTRA-ARTICULAR; INTRALESIONAL; INTRAMUSCULAR; PARENTERAL; SOFT TISSUE ONCE
Status: COMPLETED | OUTPATIENT
Start: 2021-09-14 | End: 2021-09-14

## 2021-09-14 RX ADMIN — IOHEXOL 1 ML: 300 INJECTION, SOLUTION INTRAVENOUS at 15:31

## 2021-09-14 RX ADMIN — METHYLPREDNISOLONE ACETATE 80 MG: 80 INJECTION, SUSPENSION INTRA-ARTICULAR; INTRALESIONAL; INTRAMUSCULAR; PARENTERAL; SOFT TISSUE at 15:32

## 2021-09-14 NOTE — INTERVAL H&P NOTE
Update: (This section must be completed if the H&P was completed greater than 24 hrs to procedure or admission)    H&P reviewed  After examining the patient, I find no changed to the H&P since it had been written  Patient re-evaluated   Accept as history and physical     Bruce Fenton MD/September 14, 2021/3:25 PM

## 2021-09-14 NOTE — DISCHARGE INSTR - LAB
Epidural Steroid Injection   WHAT YOU NEED TO KNOW:   An epidural steroid injection (BIANCA) is a procedure to inject steroid medicine into the epidural space  The epidural space is between your spinal cord and vertebrae  Steroids reduce inflammation and fluid buildup in your spine that may be causing pain  You may be given pain medicine along with the steroids  ACTIVITY  · Do not drive or operate machinery today  · No strenuous activity today - bending, lifting, etc   · You may resume normal activites starting tomorrow - start slowly and as tolerated  · You may shower today, but no tub baths or hot tubs  · You may have numbness for several hours from the local anesthetic  Please use caution and common sense, especially with weight-bearing activities  CARE OF THE INJECTION SITE  · If you have soreness or pain, apply ice to the area today (20 minutes on/20 minutes off)  · Starting tomorrow, you may use warm, moist heat or ice if needed  · You may have an increase or change in your discomfort for 36-48 hours after your treatment  · Apply ice and continue with any pain medication you have been prescribed  · Notify [facility] if you have any of the following: redness, drainage, swelling, headache, stiff neck or fever above 100°F     SPECIAL INSTRUCTIONS  · Our office will contact you in approximately 7 days for a progress report  MEDICATIONS  · Continue to take all routine medications  · Our office may have instructed you to hold some medications  As no general anesthesia was used in today's procedure, you should not experience any side effects related to anesthesia  If you have a problem specifically related to your procedure, please call our office at [phone]  Problems not related to your procedure should be directed to your primary care physician

## 2021-09-21 ENCOUNTER — TELEPHONE (OUTPATIENT)
Dept: PAIN MEDICINE | Facility: CLINIC | Age: 71
End: 2021-09-21

## 2021-09-23 NOTE — TELEPHONE ENCOUNTER
Pt reports 30% improvement post inj   Pain level 6/10   Pt aware I will call next week for an update

## 2021-11-23 ENCOUNTER — OFFICE VISIT (OUTPATIENT)
Dept: CARDIOLOGY CLINIC | Facility: CLINIC | Age: 71
End: 2021-11-23
Payer: MEDICARE

## 2021-11-23 VITALS
BODY MASS INDEX: 29.89 KG/M2 | RESPIRATION RATE: 16 BRPM | HEART RATE: 62 BPM | WEIGHT: 186 LBS | DIASTOLIC BLOOD PRESSURE: 85 MMHG | HEIGHT: 66 IN | OXYGEN SATURATION: 98 % | SYSTOLIC BLOOD PRESSURE: 160 MMHG

## 2021-11-23 DIAGNOSIS — I48.0 PAF (PAROXYSMAL ATRIAL FIBRILLATION) (HCC): Primary | ICD-10-CM

## 2021-11-23 DIAGNOSIS — I50.42 CHRONIC COMBINED SYSTOLIC AND DIASTOLIC CONGESTIVE HEART FAILURE (HCC): ICD-10-CM

## 2021-11-23 DIAGNOSIS — I10 HYPERTENSION, ESSENTIAL: ICD-10-CM

## 2021-11-23 DIAGNOSIS — Z79.01 CHRONIC ANTICOAGULATION: ICD-10-CM

## 2021-11-23 DIAGNOSIS — I42.9 CARDIOMYOPATHY, UNSPECIFIED TYPE (HCC): ICD-10-CM

## 2021-11-23 PROCEDURE — 99214 OFFICE O/P EST MOD 30 MIN: CPT | Performed by: INTERNAL MEDICINE

## 2021-11-23 RX ORDER — AMLODIPINE BESYLATE 10 MG/1
10 TABLET ORAL DAILY
Start: 2021-11-23

## 2022-02-01 DIAGNOSIS — I10 HYPERTENSION, ESSENTIAL: ICD-10-CM

## 2022-02-01 RX ORDER — LISINOPRIL 20 MG/1
20 TABLET ORAL 2 TIMES DAILY
Qty: 180 TABLET | Refills: 3 | Status: SHIPPED | OUTPATIENT
Start: 2022-02-01

## 2022-05-13 ENCOUNTER — OFFICE VISIT (OUTPATIENT)
Dept: CARDIOLOGY CLINIC | Facility: CLINIC | Age: 72
End: 2022-05-13
Payer: MEDICARE

## 2022-05-13 VITALS
WEIGHT: 181.4 LBS | HEART RATE: 63 BPM | SYSTOLIC BLOOD PRESSURE: 136 MMHG | OXYGEN SATURATION: 98 % | DIASTOLIC BLOOD PRESSURE: 78 MMHG | BODY MASS INDEX: 29.15 KG/M2 | HEIGHT: 66 IN

## 2022-05-13 DIAGNOSIS — I48.0 PAF (PAROXYSMAL ATRIAL FIBRILLATION) (HCC): ICD-10-CM

## 2022-05-13 DIAGNOSIS — I42.9 CARDIOMYOPATHY, UNSPECIFIED TYPE (HCC): ICD-10-CM

## 2022-05-13 DIAGNOSIS — Z79.899 ON AMIODARONE THERAPY: Primary | ICD-10-CM

## 2022-05-13 DIAGNOSIS — I50.22 CHRONIC SYSTOLIC HEART FAILURE (HCC): ICD-10-CM

## 2022-05-13 DIAGNOSIS — Z79.01 CHRONIC ANTICOAGULATION: ICD-10-CM

## 2022-05-13 PROCEDURE — 99214 OFFICE O/P EST MOD 30 MIN: CPT | Performed by: INTERNAL MEDICINE

## 2022-05-13 RX ORDER — DULOXETIN HYDROCHLORIDE 60 MG/1
60 CAPSULE, DELAYED RELEASE ORAL DAILY
COMMUNITY
Start: 2022-02-09 | End: 2023-02-09

## 2022-05-13 RX ORDER — TORSEMIDE 20 MG/1
30 TABLET ORAL DAILY
COMMUNITY
Start: 2022-04-21

## 2022-05-13 RX ORDER — GLYCOPYRROLATE 1 MG/1
1 TABLET ORAL 2 TIMES DAILY PRN
COMMUNITY
Start: 2022-02-08

## 2022-05-13 RX ORDER — METOPROLOL TARTRATE 50 MG/1
TABLET, FILM COATED ORAL
Qty: 180 TABLET | Refills: 3
Start: 2022-05-13

## 2022-05-13 RX ORDER — DICYCLOMINE HCL 20 MG
1 TABLET ORAL 3 TIMES DAILY PRN
COMMUNITY
Start: 2022-02-11 | End: 2022-05-13 | Stop reason: ALTCHOICE

## 2022-05-13 NOTE — PROGRESS NOTES
[facility] [address] [facility] [place] [address]  Cardiology Follow Up    Nedra Muller  1950  [id number]      1  On amiodarone therapy     2  Chronic combined systolic and diastolic congestive heart failure (HCC)  metoprolol tartrate (LOPRESSOR) 50 mg tablet   3  PAF (paroxysmal atrial fibrillation) ([address] )     4  Chronic anticoagulation         Chief Complaint   Patient presents with   [facility] follow up            Interval History: This patient presents for follow-up visit  Patient was hospitalized at [facility] with likely pneumonia and CHF exacerbation  Patient also has CKD  Patient had hemoptysis  Patient had a bronchoscopy  Patient was treated with antibiotics as well as IV diuretics  He denies any bleeding issues  He does have history of paroxysmal atrial fibrillation on anticoagulation  He states that he has been compliant all his present medications  There was a mention of pericardial effusion on the CT imaging studies  Previous echocardiogram in the 40 to 45% range  He also has palpitations      Patient Active Problem List   Diagnosis    Anxiety associated with depression    Chronic obstructive pulmonary disease (HCC)    Congestive heart failure (HCC)    Coronary artery disease    High cholesterol    Hypertension, essential    Insomnia    Lyme borreliosis    Polyarthropathy    Neuropathy    Myopathy    Inflammatory arthropathy    Health care maintenance    Prostate cancer screening    Colon cancer screening    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[facility] [address]    Malignant neoplasm of lower lobe of right lung (HCC)    Chronic bilateral low back pain    Protein-calorie malnutrition (HCC)    Puerperal sepsis with acute hypoxic respiratory failure without septic shock (HCC)    Atrial fibrillation (HCC)    PMR (polymyalgia rheumatica) (HCC)     Past Medical History:   Diagnosis Date    Cancer ([facility] [address] )     Lung Cancer    CHF (congestive heart failure) ([facility] [address] )     Chronic kidney disease     Colon polyp     Disease of thyroid gland     Fatty liver     Hyperlipidemia     Hypertension     Kidney stone     Nonischemic cardiomyopathy ([facility] [address] )     last assessed 17; resolved 16     Social History     Socioeconomic History    Marital status: /Civil Union     Spouse name: Not on file    Number of children: Not on file    Years of education: Not on file    Highest education level: Not on file   Occupational History    Occupation: retired   Tobacco Use    Smoking status: Former Smoker     Packs/day: 1 00     Years: 20 00     Pack years: 20 00     Quit date: 1998     Years since quittin 4    Smokeless tobacco: Never Used   Vaping Use    Vaping Use: Never used   Substance and Sexual Activity    Alcohol use:  Yes     Alcohol/week: 6 0 standard drinks     Types: 6 Glasses of wine per week     Comment: with dinner    Drug use: No    Sexual activity: Yes     Partners: Male   Other Topics Concern    Not on file   Social History Narrative    Disabled        No advance directives     Social Determinants of Health     Financial Resource Strain: Not on file   Food Insecurity: Not on file   Transportation Needs: Not on file   Physical Activity: Not on file   Stress: Not on file   Social Connections: Not on file   Intimate Partner Violence: Not on file   Housing Stability: Not on file      Family History   Adopted: Yes   Problem Relation Age of Onset    No Known Problems Mother     No Known Problems Father      Past Surgical History:   Procedure Laterality Date    CARDIAC CATHETERIZATION      his ablation    CATARACT EXTRACTION, BILATERAL Bilateral 10/01/2020    COLONOSCOPY      CYSTOSCOPY      LIPOMA RESECTION      LUNG CANCER SURGERY      ROTATOR CUFF REPAIR         Current Outpatient Medications:     albuterol (PROVENTIL HFA,VENTOLIN HFA) 90 mcg/act inhaler, Inhale 1 puff every 4 (four) hours as needed for wheezing, Disp: 1 Inhaler, Rfl: 6    amiodarone 200 mg tablet, Take 1 tablet (200 mg total) by mouth daily, Disp: 90 tablet, Rfl: 3    amLODIPine (NORVASC) 10 mg tablet, Take 1 tablet (10 mg total) by mouth daily, Disp: , Rfl:     apixaban (ELIQUIS) 5 mg, Take 1 tablet (5 mg total) by mouth 2 (two) times a day, Disp: 60 tablet, Rfl: 11    atorvastatin (LIPITOR) 10 mg tablet, Take 1 tablet (10 mg total) by mouth daily, Disp: 90 tablet, Rfl: 3    donepezil (ARICEPT) 10 mg tablet, Take 1 tablet (10 mg total) by mouth daily at bedtime, Disp: 90 tablet, Rfl: 3    DULoxetine (CYMBALTA) 60 mg delayed release capsule, Take 60 mg by mouth in the morning , Disp: , Rfl:     fluticasone (FLONASE) 50 mcg/act nasal spray, 1 spray into each nostril daily, Disp: 1 Bottle, Rfl: 3    gabapentin (NEURONTIN) 100 mg capsule, Take 1 capsule by mouth twice daily, Disp: 180 capsule, Rfl: 3    glycopyrrolate (ROBINUL) 1 mg tablet, Take 1 mg by mouth as needed in the morning and 1 mg as needed in the evening , Disp: , Rfl:     levothyroxine 75 mcg tablet, Take 1 tablet (75 mcg total) by mouth daily, Disp: 90 tablet, Rfl: 3    lisinopril (ZESTRIL) 20 mg tablet, Take 1 tablet (20 mg total) by mouth 2 (two) times a day, Disp: 180 tablet, Rfl: 3    metoprolol tartrate (LOPRESSOR) 50 mg tablet, 1 tab twice a day, Disp: 180 tablet, Rfl: 3    montelukast (SINGULAIR) 10 mg tablet, Take 1 tablet (10 mg total) by mouth daily, Disp: 90 tablet, Rfl: 3    nitroglycerin (Nitrostat) 0 4 mg SL tablet, Place 1 tablet (0 4 mg total) under the tongue every 5 (five) minutes as needed for chest pain, Disp: 25 tablet, Rfl: 3    pantoprazole (PROTONIX) 40 mg tablet, Take 1 tablet (40 mg total) by mouth daily, Disp: 30 tablet, Rfl: 1    predniSONE 5 mg tablet, Take by mouth as needed , Disp: , Rfl:     rOPINIRole (REQUIP) 0 5 mg tablet, Take 1 tablet (0 5 mg total) by mouth daily at bedtime, Disp: 90 tablet, Rfl: 3    tamsulosin (FLOMAX) 0 4 mg, TAKE 1 CAPSULE BY MOUTH ONCE DAILY WITH  DINNER, Disp: 90 capsule, Rfl: 3    torsemide (DEMADEX) 20 mg tablet, Take 30 mg by mouth in the morning , Disp: , Rfl:     Trelegy Ellipta 100-62 5-25 MCG/INH inhaler, Inhale 1 puff daily, Disp: 1 Inhaler, Rfl: 5    Levothyroxine Sodium 75 MCG CAPS, Take 1 capsule (75 mcg total) by mouth daily, Disp: 90 capsule, Rfl: 3  Allergies   Allergen Reactions    Pollen Extract Itching and Sneezing       Labs:  No visits with results within 2 Month(s) from this visit  Latest known visit with results is:   Appointment on 08/07/2021   Component Date Value    NT-proBNP 08/07/2021 4,335 (A)    Hepatitis C Ab 08/07/2021 Non-reactive     D-Dimer, Quant 08/07/2021 1 27 (A)    Cholesterol 08/07/2021 187     Triglycerides 08/07/2021 141     HDL, Direct 08/07/2021 57     LDL Calculated 08/07/2021 102 (A)    Non-HDL-Chol (CHOL-HDL) 08/07/2021 130      Imaging: No results found  Review of Systems:  Review of Systems   REVIEW OF SYSTEMS:  Constitutional:  Denies fever or chills   Eyes:  Denies change in visual acuity   HENT:  Denies nasal congestion or sore throat   Respiratory:   shortness of breath   Cardiovascular:  Denies chest pain or edema   GI:  Denies abdominal pain, nausea, vomiting, bloody stools or diarrhea   :  Denies dysuria, frequency, difficulty in micturition and nocturia  Musculoskeletal:  Denies back pain or joint pain   Neurologic:  Denies headache, focal weakness or sensory changes   Endocrine:  Denies polyuria or polydipsia   Lymphatic:  Denies swollen glands   Psychiatric:  Denies depression or anxiety     Physical Exam:    /78 (BP Location: Left arm, Patient Position: Sitting, Cuff Size: Standard)   Pulse 63   Ht 5' 6" (1 676 m)   Wt 82 3 kg (181 lb 6 4 oz)   SpO2 98%   BMI 29 28 kg/m²     Physical Exam   PHYSICAL EXAM:  General:  Patient is not in acute distress   Head: Normocephalic, Atraumatic    HEENT:  Both pupils normal-size atraumatic, normocephalic, nonicteric  Neck: JVP not raised  Trachea central  No carotid bruit  Respiratory:  Decreased breath sounds bilateral  Cardiovascular:  Regular rate and rhythm no S3 no murmurs  GI:  Abdomen soft nontender  No organomegaly  Lymphatic:  No cervical or inguinal lymphadenopathy  Neurologic:  Patient is awake alert, oriented   Grossly nonfocal  Extremities no edema    Discussion/Summary:  Patient with multiple medical problems who seems to be doing reasonably well from cardiac standpoint  Previous studies reviewed with patient  Medications reviewed and possible side effects discussed  concepts of cardiovascular disease , signs and symptoms of heart disease  Dietary and risk factor modification reinforced  All questions answered  Safety measures reviewed  Patient advised to report any problems prompting medical attention  Will obtain Holter monitor given symptoms of palpitations  Will also get a limited echocardiogram to reassess ejection fraction given history of cardiomyopathy and the clarify the pericardial effusion noted on CT imaging studies  Patient will continue to follow-up with Pulmonary  Patient does have history of lung cancer status post surgery and radiation therapy  Patient has been followed by Oncology as well as pulmonary  Patient understands the risks and benefits of anticoagulation to prevent thromboembolic risk from paroxysmal atrial fibrillation  Patient has CKD with a baseline creatinine of approximately 1 5 to 1 6  Follow-up in a few months or earlier as needed  Patient is agreeable the plan of care

## 2022-06-17 DIAGNOSIS — G25.81 RESTLESS LEG: ICD-10-CM

## 2022-06-20 RX ORDER — ROPINIROLE 0.5 MG/1
0.5 TABLET, FILM COATED ORAL
Qty: 90 TABLET | Refills: 3 | OUTPATIENT
Start: 2022-06-20

## 2022-10-12 PROBLEM — J96.01 PUERPERAL SEPSIS WITH ACUTE HYPOXIC RESPIRATORY FAILURE WITHOUT SEPTIC SHOCK (HCC): Status: RESOLVED | Noted: 2021-04-05 | Resolved: 2022-10-12

## 2022-10-12 PROBLEM — R65.20 PUERPERAL SEPSIS WITH ACUTE HYPOXIC RESPIRATORY FAILURE WITHOUT SEPTIC SHOCK (HCC): Status: RESOLVED | Noted: 2021-04-05 | Resolved: 2022-10-12

## 2022-11-22 NOTE — PROGRESS NOTES
Pain Medicine Follow-Up Note    Assessment:  1  Lumbar radiculopathy    2  Neural foraminal stenosis of lumbar spine        Plan:  Orders Placed This Encounter   Procedures   • FL spine and pain procedure     Standing Status:   Future     Standing Expiration Date:   11/23/2026     Order Specific Question:   Reason for Exam:     Answer:   right L5-S1 LESI     Order Specific Question:   Anticoagulant hold needed? Answer:   eliquis   • Ambulatory referral to Physical Therapy     Standing Status:   Future     Standing Expiration Date:   11/23/2023     Referral Priority:   Routine     Referral Type:   Physical Therapy     Referral Reason:   Specialty Services Required     Requested Specialty:   Physical Therapy     Number of Visits Requested:   1     Expiration Date:   11/23/2023       New Medications Ordered This Visit   Medications   • amoxicillin-clavulanate (AUGMENTIN) 500-125 mg per tablet     Sig: TAKE 1 TABLET BY MOUTH THREE TIMES DAILY FOR 7 DAYS   • dexamethasone (DECADRON) 6 mg tablet     Sig: TAKE 1 TABLET BY MOUTH ONCE DAILY WITH BREAKFAST FOR 7 DOSES   • levothyroxine 100 mcg tablet   • gabapentin (NEURONTIN) 300 mg capsule     Sig: Take 1 capsule (300 mg total) by mouth 2 (two) times a day     Dispense:  60 capsule     Refill:  2       My impressions and treatment recommendations were discussed in detail with the patient who verbalized understanding and had no further questions  51-year-old male who returns to our office with recurrent pain with right lower extremity radiculopathy  Similar presentation to previous  Had notable relief again with L5-S1 epidural steroid injection with almost 1 year of 60-70% relief  His symptoms are quite debilitating now  We will schedule him for repeat injection due to improvement in the past   I would also like for him to begin aquatic therapy    If no success with repeat injection, may consider altering the approach to transforaminal at the right L4 and L5 regions  If no success with injection aquatic therapy, then will be likely referring him to see Dr Kenia Romo given severity of the foraminal stenosis  [place] Prescription Drug Monitoring Program report was reviewed and was appropriate     Complete risks and benefits including bleeding, infection, tissue reaction, nerve injury and allergic reaction were discussed  The approach was demonstrated using models and literature was provided  Verbal and written consent was obtained  Discharge instructions were provided  I personally saw and examined the patient and I agree with the above discussed plan of care  History of Present Illness:    Angel Redmond is a 67 y o  male who presents to [facility] for interval re-evaluation of the above stated pain complaints  The patient has a past medical and chronic pain history as outlined in the assessment section  He was last seen on 09/14/2021 for right parasagittal L5-S1 LESI  60-70% relief for almost a year  Returns today with increasing back and right lower extremity pain  Requesting repeat injection  Pain is constant, sharp, shooting, numbness in nature  Since last visit, patient was hospitalized with pneumonia  He appears weak today  Other than as stated above, the patient denies any interval changes in medications, medical condition, mental condition, symptoms, or allergies since the last office visit           Review of Systems:    Review of Systems      Past Medical History:   Diagnosis Date   • Cancer ([facility] [address] )     Lung Cancer   • CHF (congestive heart failure) ([facility] [address] )    • Chronic kidney disease    • Colon polyp    • Disease of thyroid gland    • Fatty liver    • Hyperlipidemia    • Hypertension    • Kidney stone    • Nonischemic cardiomyopathy ([facility] [address] )     last assessed 6/21/17; resolved 9/27/16       Past Surgical History:   Procedure Laterality Date   • CARDIAC CATHETERIZATION      his ablation   • CATARACT EXTRACTION, BILATERAL Bilateral 10/01/2020   • COLONOSCOPY     • CYSTOSCOPY     • LIPOMA RESECTION     • LUNG CANCER SURGERY     • ROTATOR CUFF REPAIR         Family History   Adopted: Yes   Problem Relation Age of Onset   • No Known Problems Mother    • No Known Problems Father        Social History     Occupational History   • Occupation: retired   Tobacco Use   • Smoking status: Former     Packs/day: 1 00     Years: 20 00     Pack years: 20 00     Types: Cigarettes     Quit date: 1998     Years since quittin 9   • Smokeless tobacco: Never   Vaping Use   • Vaping Use: Never used   Substance and Sexual Activity   • Alcohol use:  Yes     Alcohol/week: 6 0 standard drinks     Types: 6 Glasses of wine per week     Comment: with dinner   • Drug use: No   • Sexual activity: Yes     Partners: Male         Current Outpatient Medications:   •  albuterol (PROVENTIL HFA,VENTOLIN HFA) 90 mcg/act inhaler, Inhale 1 puff every 4 (four) hours as needed for wheezing, Disp: 1 Inhaler, Rfl: 6  •  amLODIPine (NORVASC) 10 mg tablet, Take 1 tablet (10 mg total) by mouth daily, Disp: , Rfl:   •  atorvastatin (LIPITOR) 10 mg tablet, Take 1 tablet (10 mg total) by mouth daily, Disp: 90 tablet, Rfl: 3  •  DULoxetine (CYMBALTA) 60 mg delayed release capsule, Take 60 mg by mouth in the morning , Disp: , Rfl:   •  fluticasone (FLONASE) 50 mcg/act nasal spray, 1 spray into each nostril daily, Disp: 1 Bottle, Rfl: 3  •  gabapentin (NEURONTIN) 300 mg capsule, Take 1 capsule (300 mg total) by mouth 2 (two) times a day, Disp: 60 capsule, Rfl: 2  •  glycopyrrolate (ROBINUL) 1 mg tablet, Take 1 mg by mouth as needed in the morning and 1 mg as needed in the evening , Disp: , Rfl:   •  lisinopril (ZESTRIL) 20 mg tablet, Take 1 tablet (20 mg total) by mouth 2 (two) times a day, Disp: 180 tablet, Rfl: 3  •  metoprolol tartrate (LOPRESSOR) 50 mg tablet, 1 tab twice a day, Disp: 180 tablet, Rfl: 3  •  nitroglycerin (Nitrostat) 0 4 mg SL tablet, Place 1 tablet (0 4 mg total) under the tongue every 5 (five) minutes as needed for chest pain, Disp: 25 tablet, Rfl: 3  •  pantoprazole (PROTONIX) 40 mg tablet, Take 1 tablet (40 mg total) by mouth daily, Disp: 30 tablet, Rfl: 1  •  predniSONE 5 mg tablet, Take by mouth as needed , Disp: , Rfl:   •  rOPINIRole (REQUIP) 0 5 mg tablet, Take 1 tablet (0 5 mg total) by mouth daily at bedtime, Disp: 90 tablet, Rfl: 3  •  tamsulosin (FLOMAX) 0 4 mg, TAKE 1 CAPSULE BY MOUTH ONCE DAILY WITH  DINNER, Disp: 90 capsule, Rfl: 3  •  torsemide (DEMADEX) 20 mg tablet, Take 30 mg by mouth in the morning , Disp: , Rfl:   •  Trelegy Ellipta 100-62 5-25 MCG/INH inhaler, Inhale 1 puff daily, Disp: 1 Inhaler, Rfl: 5  •  amiodarone 200 mg tablet, Take 1 tablet (200 mg total) by mouth daily, Disp: 90 tablet, Rfl: 3  •  amoxicillin-clavulanate (AUGMENTIN) 500-125 mg per tablet, TAKE 1 TABLET BY MOUTH THREE TIMES DAILY FOR 7 DAYS, Disp: , Rfl:   •  apixaban (ELIQUIS) 5 mg, Take 1 tablet (5 mg total) by mouth 2 (two) times a day, Disp: 60 tablet, Rfl: 11  •  dexamethasone (DECADRON) 6 mg tablet, TAKE 1 TABLET BY MOUTH ONCE DAILY WITH BREAKFAST FOR 7 DOSES, Disp: , Rfl:   •  donepezil (ARICEPT) 10 mg tablet, Take 1 tablet (10 mg total) by mouth daily at bedtime, Disp: 90 tablet, Rfl: 3  •  levothyroxine 100 mcg tablet, , Disp: , Rfl:   •  levothyroxine 75 mcg tablet, Take 1 tablet (75 mcg total) by mouth daily, Disp: 90 tablet, Rfl: 3  •  montelukast (SINGULAIR) 10 mg tablet, Take 1 tablet (10 mg total) by mouth daily, Disp: 90 tablet, Rfl: 3    Allergies   Allergen Reactions   • Pollen Extract Itching and Sneezing       Physical Exam:    /89 (BP Location: Left arm, Patient Position: Sitting, Cuff Size: Standard)   Pulse 66   Wt 84 3 kg (185 lb 12 8 oz)   BMI 29 99 kg/m²     Constitutional:normal, well developed, well nourished, alert, in no distress and non-toxic and no overt pain behavior    Eyes:anicteric  HEENT:grossly intact  Neck:supple, symmetric, trachea midline and no masses   Pulmonary:even and unlabored  Cardiovascular:No edema or pitting edema present  Skin:Normal without rashes or lesions and well hydrated  Psychiatric:Mood and affect appropriate  Neurologic:Cranial Nerves II-XII grossly intact  Musculoskeletal:normal      Imaging  FL spine and pain procedure    (Results Pending)         Orders Placed This Encounter   Procedures   • FL spine and pain procedure   • Ambulatory referral to Physical Therapy

## 2022-11-23 ENCOUNTER — OFFICE VISIT (OUTPATIENT)
Dept: PAIN MEDICINE | Facility: CLINIC | Age: 72
End: 2022-11-23

## 2022-11-23 VITALS
DIASTOLIC BLOOD PRESSURE: 89 MMHG | WEIGHT: 185.8 LBS | BODY MASS INDEX: 29.99 KG/M2 | HEART RATE: 66 BPM | SYSTOLIC BLOOD PRESSURE: 158 MMHG

## 2022-11-23 DIAGNOSIS — M48.061 NEURAL FORAMINAL STENOSIS OF LUMBAR SPINE: ICD-10-CM

## 2022-11-23 DIAGNOSIS — M54.16 LUMBAR RADICULOPATHY: Primary | ICD-10-CM

## 2022-11-23 RX ORDER — LEVOTHYROXINE SODIUM 0.1 MG/1
TABLET ORAL
COMMUNITY
Start: 2022-11-22

## 2022-11-23 RX ORDER — DEXAMETHASONE 6 MG/1
TABLET ORAL
COMMUNITY
Start: 2022-11-03

## 2022-11-23 RX ORDER — AMOXICILLIN AND CLAVULANATE POTASSIUM 500; 125 MG/1; MG/1
TABLET, FILM COATED ORAL
COMMUNITY
Start: 2022-09-07

## 2022-11-23 RX ORDER — GABAPENTIN 300 MG/1
300 CAPSULE ORAL 2 TIMES DAILY
Qty: 60 CAPSULE | Refills: 2 | Status: SHIPPED | OUTPATIENT
Start: 2022-11-23

## 2022-12-02 ENCOUNTER — OFFICE VISIT (OUTPATIENT)
Dept: CARDIOLOGY CLINIC | Facility: CLINIC | Age: 72
End: 2022-12-02

## 2022-12-02 VITALS
DIASTOLIC BLOOD PRESSURE: 84 MMHG | BODY MASS INDEX: 29.41 KG/M2 | HEART RATE: 88 BPM | HEIGHT: 66 IN | RESPIRATION RATE: 16 BRPM | OXYGEN SATURATION: 99 % | WEIGHT: 183 LBS | SYSTOLIC BLOOD PRESSURE: 146 MMHG

## 2022-12-02 DIAGNOSIS — I48.0 PAF (PAROXYSMAL ATRIAL FIBRILLATION) (HCC): ICD-10-CM

## 2022-12-02 DIAGNOSIS — I42.9 CARDIOMYOPATHY, UNSPECIFIED TYPE (HCC): Primary | ICD-10-CM

## 2022-12-02 DIAGNOSIS — I50.22 CHRONIC SYSTOLIC HEART FAILURE (HCC): ICD-10-CM

## 2022-12-02 DIAGNOSIS — Z79.01 CHRONIC ANTICOAGULATION: ICD-10-CM

## 2022-12-02 RX ORDER — TORSEMIDE 20 MG/1
20 TABLET ORAL DAILY
Qty: 30 TABLET | Refills: 5 | Status: SHIPPED | OUTPATIENT
Start: 2022-12-02

## 2022-12-06 NOTE — PROGRESS NOTES
[facility] [place]  [id number] [facility] [place] [address]  Cardiology Follow Up    Nafisa Milton  1950  [id number]      1  Cardiomyopathy, unspecified type ([facility] )  torsemide (DEMADEX) 20 mg tablet    Basic metabolic panel      2  Chronic systolic heart failure (HCC)  torsemide (DEMADEX) 20 mg tablet    Basic metabolic panel      3  PAF (paroxysmal atrial fibrillation) ([facility] )        4  Chronic anticoagulation            Chief Complaint   Patient presents with   • Follow-up       Interval History: This patient presents for follow-up visit  Patient was hospitalized at [facility]  Patient has history of lung cancer  Patient also has history of nonischemic cardiomyopathy with congestive heart failure and paroxysmal atrial fibrillation  Patient does have CKD  Patient was treated for CHF and pleural effusion  Patient had BLANCA  Patient was told that he needs diuretics but was not started yet on that  Patient presently is off diuretics  Patient does have some shortness of breath  No history of leg edema orthopnea PND  No history of bleeding issues  He states that he has been compliant all his present medications          Patient Active Problem List   Diagnosis   • Anxiety associated with depression   • Chronic obstructive pulmonary disease ([facility] )   • Congestive heart failure ([place] [address] )   • Coronary artery disease   • High cholesterol   • Hypertension, essential   • Insomnia   • Lyme borreliosis   • Polyarthropathy   • Neuropathy   • Myopathy   • Inflammatory arthropathy   • Health care maintenance   • Prostate cancer screening   • Colon cancer screening   • 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[place] [address] )   • Malignant neoplasm of lower lobe of right lung (HCC)   • Chronic bilateral low back pain   • Protein-calorie malnutrition (HCC)   • Atrial fibrillation (HCC)   • PMR (polymyalgia rheumatica) (HCC)     Past Medical History:   Diagnosis Date   • Cancer ([place] [address] )     Lung Cancer   • CHF (congestive heart failure) ([facility] )    • Chronic kidney disease    • Colon polyp    • Disease of thyroid gland    • Fatty liver    • Hyperlipidemia    • Hypertension    • Kidney stone    • Nonischemic cardiomyopathy ([facility] )     last assessed 17; resolved 16     Social History     Socioeconomic History   • Marital status: /Civil Union     Spouse name: Not on file   • Number of children: Not on file   • Years of education: Not on file   • Highest education level: Not on file   Occupational History   • Occupation: retired   Tobacco Use   • Smoking status: Former     Packs/day: 1 00     Years: 20 00     Pack years: 20 00     Types: Cigarettes     Quit date: 1998     Years since quittin 0   • Smokeless tobacco: Never   Vaping Use   • Vaping Use: Never used   Substance and Sexual Activity   • Alcohol use:  Yes     Alcohol/week: 6 0 standard drinks     Types: 6 Glasses of wine per week     Comment: with dinner   • Drug use: No   • Sexual activity: Yes     Partners: Male   Other Topics Concern   • Not on file   Social History Narrative    Disabled        No advance directives     Social Determinants of Health     Financial Resource Strain: Not on file   Food Insecurity: Not on file   Transportation Needs: Not on file   Physical Activity: Not on file   Stress: Not on file   Social Connections: Not on file   Intimate Partner Violence: Not on file   Housing Stability: Not on file      Family History   Adopted: Yes   Problem Relation Age of Onset   • No Known Problems Mother    • No Known Problems Father      Past Surgical History:   Procedure Laterality Date   • CARDIAC CATHETERIZATION      his ablation   • CATARACT EXTRACTION, BILATERAL Bilateral 10/01/2020   • COLONOSCOPY     • CYSTOSCOPY     • LIPOMA RESECTION     • LUNG CANCER SURGERY     • ROTATOR CUFF REPAIR         Current Outpatient Medications:   •  albuterol (PROVENTIL HFA,VENTOLIN HFA) 90 mcg/act inhaler, Inhale 1 puff every 4 (four) hours as needed for wheezing, Disp: 1 Inhaler, Rfl: 6  •  amiodarone 200 mg tablet, Take 1 tablet (200 mg total) by mouth daily, Disp: 90 tablet, Rfl: 3  •  amLODIPine (NORVASC) 10 mg tablet, Take 1 tablet (10 mg total) by mouth daily, Disp: , Rfl:   •  apixaban (ELIQUIS) 5 mg, Take 1 tablet (5 mg total) by mouth 2 (two) times a day, Disp: 60 tablet, Rfl: 11  •  atorvastatin (LIPITOR) 10 mg tablet, Take 1 tablet (10 mg total) by mouth daily, Disp: 90 tablet, Rfl: 3  •  dexamethasone (DECADRON) 6 mg tablet, TAKE 1 TABLET BY MOUTH ONCE DAILY WITH BREAKFAST FOR 7 DOSES, Disp: , Rfl:   •  DULoxetine (CYMBALTA) 60 mg delayed release capsule, Take 60 mg by mouth in the morning , Disp: , Rfl:   •  fluticasone (FLONASE) 50 mcg/act nasal spray, 1 spray into each nostril daily, Disp: 1 Bottle, Rfl: 3  •  gabapentin (NEURONTIN) 300 mg capsule, Take 1 capsule (300 mg total) by mouth 2 (two) times a day, Disp: 60 capsule, Rfl: 2  •  glycopyrrolate (ROBINUL) 1 mg tablet, Take 1 mg by mouth as needed in the morning and 1 mg as needed in the evening , Disp: , Rfl:   •  levothyroxine 100 mcg tablet, , Disp: , Rfl:   •  lisinopril (ZESTRIL) 20 mg tablet, Take 1 tablet (20 mg total) by mouth 2 (two) times a day, Disp: 180 tablet, Rfl: 3  •  metoprolol tartrate (LOPRESSOR) 50 mg tablet, 1 tab twice a day, Disp: 180 tablet, Rfl: 3  •  nitroglycerin (Nitrostat) 0 4 mg SL tablet, Place 1 tablet (0 4 mg total) under the tongue every 5 (five) minutes as needed for chest pain, Disp: 25 tablet, Rfl: 3  •  pantoprazole (PROTONIX) 40 mg tablet, Take 1 tablet (40 mg total) by mouth daily, Disp: 30 tablet, Rfl: 1  •  predniSONE 5 mg tablet, Take by mouth as needed , Disp: , Rfl:   •  rOPINIRole (REQUIP) 0 5 mg tablet, Take 1 tablet (0 5 mg total) by mouth daily at bedtime, Disp: 90 tablet, Rfl: 3  •  tamsulosin (FLOMAX) 0 4 mg, TAKE 1 CAPSULE BY MOUTH ONCE DAILY WITH  DINNER, Disp: 90 capsule, Rfl: 3  •  torsemide (DEMADEX) 20 mg tablet, Take 1 tablet (20 mg total) by mouth daily, Disp: 30 tablet, Rfl: 5  •  Trelegy Ellipta 100-62 5-25 MCG/INH inhaler, Inhale 1 puff daily, Disp: 1 Inhaler, Rfl: 5  •  amoxicillin-clavulanate (AUGMENTIN) 500-125 mg per tablet, TAKE 1 TABLET BY MOUTH THREE TIMES DAILY FOR 7 DAYS (Patient not taking: Reported on 12/2/2022), Disp: , Rfl:   •  donepezil (ARICEPT) 10 mg tablet, Take 1 tablet (10 mg total) by mouth daily at bedtime, Disp: 90 tablet, Rfl: 3  •  levothyroxine 75 mcg tablet, Take 1 tablet (75 mcg total) by mouth daily, Disp: 90 tablet, Rfl: 3  •  montelukast (SINGULAIR) 10 mg tablet, Take 1 tablet (10 mg total) by mouth daily, Disp: 90 tablet, Rfl: 3  Allergies   Allergen Reactions   • Pollen Extract Itching and Sneezing       Labs:  No visits with results within 2 Month(s) from this visit  Latest known visit with results is:   Appointment on 08/07/2021   Component Date Value   • NT-proBNP 08/07/2021 4,335 (H)    • Hepatitis C Ab 08/07/2021 Non-reactive    • D-Dimer, Quant 08/07/2021 1 27 (H)    • Cholesterol 08/07/2021 187    • Triglycerides 08/07/2021 141    • HDL, Direct 08/07/2021 57    • LDL Calculated 08/07/2021 102 (H)    • Non-HDL-Chol (CHOL-HDL) 08/07/2021 130      Imaging: No results found      Review of Systems:  Review of Systems   REVIEW OF SYSTEMS:  Constitutional:  Denies fever or chills   Eyes:  Denies change in visual acuity   HENT:  Denies nasal congestion or sore throat   Respiratory:   shortness of breath   Cardiovascular:  Denies chest pain or edema   GI:  Denies abdominal pain, nausea, vomiting, bloody stools or diarrhea   :  Denies dysuria, frequency, difficulty in micturition and nocturia  Musculoskeletal:  Denies back pain or joint pain   Neurologic:  Denies headache, focal weakness or sensory changes   Endocrine:  Denies polyuria or polydipsia   Lymphatic:  Denies swollen glands   Psychiatric:  Denies depression or anxiety    Physical Exam:    /84 (BP Location: Left arm, Patient Position: Sitting, Cuff Size: Standard)   Pulse 88   Resp 16   Ht 5' 6" (1 676 m)   Wt 83 kg (183 lb)   SpO2 99%   BMI 29 54 kg/m²     Physical Exam   PHYSICAL EXAM:  General:  Patient is not in acute distress   Head: Normocephalic, Atraumatic  HEENT:  Unremarkable  Neck:  JVP not raised  Trachea central  No carotid bruit  Respiratory:  Decreased breath sounds bilateral  Cardiovascular:  Regular rate and rhythm no S3 no murmurs  GI:  Abdomen soft nontender  No organomegaly  Lymphatic:  No cervical or inguinal lymphadenopathy  Neurologic:  Patient is awake alert, oriented   Grossly nonfocal  Extremities no edema    Discussion/Summary:  Patient with multiple medical problems who seems to be doing reasonably well from cardiac standpoint  Previous studies reviewed with patient  Medications reviewed and possible side effects discussed  concepts of cardiovascular disease , signs and symptoms of heart disease  Dietary and risk factor modification reinforced  All questions answered  Safety measures reviewed  Patient advised to report any problems prompting medical attention  Importance of salt restriction reinforced  Patient was restarted on torsemide 20 mg p o  daily  Patient will check BMP in 7 to 10 days  Patient instructed to check his weight on a regular basis  Risks and benefits  and alternatives of anticoagulation to prevent thromboembolic risk from atrial fibrillation discussed at length  Patient to report any bleeding issues  Follow-up with Pulmonary as well as Oncology for history of lung cancer  Patient does have history of recurrent pleural effusion which could be related to his lung cancer  Overall prognosis is guarded  Follow-up in a few months or earlier as needed  Patient is agreeable with the plan of care

## 2022-12-09 ENCOUNTER — TELEPHONE (OUTPATIENT)
Dept: RADIOLOGY | Facility: CLINIC | Age: 72
End: 2022-12-09

## 2022-12-09 NOTE — TELEPHONE ENCOUNTER
Pt of Dr Caitlin Cope    Pt is to be scheduled for a lumbar epidural steroid injection with Dr Sarah Tello  Pt would need to hold his eliquis for 3 days prior to procedure and resume day of procedure    Please advise if Dr Caitlin Cope is ok with this hold

## 2023-01-10 ENCOUNTER — HOSPITAL ENCOUNTER (OUTPATIENT)
Dept: RADIOLOGY | Facility: CLINIC | Age: 73
Discharge: HOME | End: 2023-01-10
Admitting: STUDENT IN AN ORGANIZED HEALTH CARE EDUCATION/TRAINING PROGRAM

## 2023-01-10 VITALS
TEMPERATURE: 97.6 F | DIASTOLIC BLOOD PRESSURE: 68 MMHG | SYSTOLIC BLOOD PRESSURE: 150 MMHG | RESPIRATION RATE: 20 BRPM | HEART RATE: 73 BPM | OXYGEN SATURATION: 97 %

## 2023-01-10 DIAGNOSIS — M54.16 LUMBAR RADICULOPATHY: ICD-10-CM

## 2023-01-10 RX ORDER — METHYLPREDNISOLONE ACETATE 80 MG/ML
80 INJECTION, SUSPENSION INTRA-ARTICULAR; INTRALESIONAL; INTRAMUSCULAR; PARENTERAL; SOFT TISSUE ONCE
Status: COMPLETED | OUTPATIENT
Start: 2023-01-10 | End: 2023-01-10

## 2023-01-10 RX ADMIN — METHYLPREDNISOLONE ACETATE 80 MG: 80 INJECTION, SUSPENSION INTRA-ARTICULAR; INTRALESIONAL; INTRAMUSCULAR; PARENTERAL; SOFT TISSUE at 15:19

## 2023-01-10 RX ADMIN — IOHEXOL 1 ML: 300 INJECTION, SOLUTION INTRAVENOUS at 15:17

## 2023-01-10 NOTE — DISCHARGE INSTR - LAB
Epidural Steroid Injection   WHAT YOU NEED TO KNOW:   An epidural steroid injection (BIANCA) is a procedure to inject steroid medicine into the epidural space  The epidural space is between your spinal cord and vertebrae  Steroids reduce inflammation and fluid buildup in your spine that may be causing pain  You may be given pain medicine along with the steroids  ACTIVITY  Do not drive or operate machinery today  No strenuous activity today - bending, lifting, etc   You may resume normal activites starting tomorrow - start slowly and as tolerated  You may shower today, but no tub baths or hot tubs  You may have numbness for several hours from the local anesthetic  Please use caution and common sense, especially with weight-bearing activities  CARE OF THE INJECTION SITE  If you have soreness or pain, apply ice to the area today (20 minutes on/20 minutes off)  Starting tomorrow, you may use warm, moist heat or ice if needed  You may have an increase or change in your discomfort for 36-48 hours after your treatment  Apply ice and continue with any pain medication you have been prescribed  Notify the [facility] if you have any of the following: redness, drainage, swelling, headache, stiff neck or fever above 100°F     SPECIAL INSTRUCTIONS  Our office will contact you in approximately 7 days for a progress report  MEDICATIONS  Continue to take all routine medications  Our office may have instructed you to hold some medications  As no general anesthesia was used in today's procedure, you should not experience any side effects related to anesthesia  If you are diabetic, the steroids used in today's injection may temporarily increase your blood sugar levels after the first few days after your injection  Please keep a close eye on your sugars and alert the doctor who manages your diabetes if your sugars are significantly high from your baseline or you are symptomatic       If you have a problem specifically related to your procedure, please call our office at [phone]  Problems not related to your procedure should be directed to your primary care physician

## 2023-01-10 NOTE — H&P
History of Present Illness:  The patient is a 67 y o  male who presents with complaints of back and right leg pain    Past Medical History:   Diagnosis Date   • Cancer ([facility] )     Lung Cancer   • CHF (congestive heart failure) ([facility] )    • Chronic kidney disease    • Colon polyp    • Disease of thyroid gland    • Fatty liver    • Hyperlipidemia    • Hypertension    • Kidney stone    • Nonischemic cardiomyopathy ([facility] )     last assessed 6/21/17; resolved 9/27/16       Past Surgical History:   Procedure Laterality Date   • CARDIAC CATHETERIZATION      his ablation   • CATARACT EXTRACTION, BILATERAL Bilateral 10/01/2020   • COLONOSCOPY     • CYSTOSCOPY     • LIPOMA RESECTION     • LUNG CANCER SURGERY     • ROTATOR CUFF REPAIR           Current Outpatient Medications:   •  albuterol (PROVENTIL HFA,VENTOLIN HFA) 90 mcg/act inhaler, Inhale 1 puff every 4 (four) hours as needed for wheezing, Disp: 1 Inhaler, Rfl: 6  •  amiodarone 200 mg tablet, Take 1 tablet (200 mg total) by mouth daily, Disp: 90 tablet, Rfl: 3  •  amLODIPine (NORVASC) 10 mg tablet, Take 1 tablet (10 mg total) by mouth daily, Disp: , Rfl:   •  amoxicillin-clavulanate (AUGMENTIN) 500-125 mg per tablet, TAKE 1 TABLET BY MOUTH THREE TIMES DAILY FOR 7 DAYS (Patient not taking: Reported on 12/2/2022), Disp: , Rfl:   •  apixaban (ELIQUIS) 5 mg, Take 1 tablet (5 mg total) by mouth 2 (two) times a day, Disp: 60 tablet, Rfl: 11  •  atorvastatin (LIPITOR) 10 mg tablet, Take 1 tablet (10 mg total) by mouth daily, Disp: 90 tablet, Rfl: 3  •  dexamethasone (DECADRON) 6 mg tablet, TAKE 1 TABLET BY MOUTH ONCE DAILY WITH BREAKFAST FOR 7 DOSES, Disp: , Rfl:   •  donepezil (ARICEPT) 10 mg tablet, Take 1 tablet (10 mg total) by mouth daily at bedtime, Disp: 90 tablet, Rfl: 3  •  DULoxetine (CYMBALTA) 60 mg delayed release capsule, Take 60 mg by mouth in the morning , Disp: , Rfl:   •  fluticasone (FLONASE) 50 mcg/act nasal spray, 1 spray into each nostril daily, Disp: 1 Bottle, Rfl: 3  •  gabapentin (NEURONTIN) 300 mg capsule, Take 1 capsule (300 mg total) by mouth 2 (two) times a day, Disp: 60 capsule, Rfl: 2  •  glycopyrrolate (ROBINUL) 1 mg tablet, Take 1 mg by mouth as needed in the morning and 1 mg as needed in the evening , Disp: , Rfl:   •  levothyroxine 100 mcg tablet, , Disp: , Rfl:   •  levothyroxine 75 mcg tablet, Take 1 tablet (75 mcg total) by mouth daily, Disp: 90 tablet, Rfl: 3  •  lisinopril (ZESTRIL) 20 mg tablet, Take 1 tablet (20 mg total) by mouth 2 (two) times a day, Disp: 180 tablet, Rfl: 3  •  metoprolol tartrate (LOPRESSOR) 50 mg tablet, 1 tab twice a day, Disp: 180 tablet, Rfl: 3  •  montelukast (SINGULAIR) 10 mg tablet, Take 1 tablet (10 mg total) by mouth daily, Disp: 90 tablet, Rfl: 3  •  nitroglycerin (Nitrostat) 0 4 mg SL tablet, Place 1 tablet (0 4 mg total) under the tongue every 5 (five) minutes as needed for chest pain, Disp: 25 tablet, Rfl: 3  •  pantoprazole (PROTONIX) 40 mg tablet, Take 1 tablet (40 mg total) by mouth daily, Disp: 30 tablet, Rfl: 1  •  predniSONE 5 mg tablet, Take by mouth as needed , Disp: , Rfl:   •  rOPINIRole (REQUIP) 0 5 mg tablet, Take 1 tablet (0 5 mg total) by mouth daily at bedtime, Disp: 90 tablet, Rfl: 3  •  tamsulosin (FLOMAX) 0 4 mg, TAKE 1 CAPSULE BY MOUTH ONCE DAILY WITH  DINNER, Disp: 90 capsule, Rfl: 3  •  torsemide (DEMADEX) 20 mg tablet, Take 1 tablet (20 mg total) by mouth daily, Disp: 30 tablet, Rfl: 5  •  Trelegy Ellipta 100-62 5-25 MCG/INH inhaler, Inhale 1 puff daily, Disp: 1 Inhaler, Rfl: 5    Allergies   Allergen Reactions   • Pollen Extract Itching and Sneezing       Physical Exam: There were no vitals filed for this visit    General: Awake, Alert, Oriented x 3, Mood and affect appropriate  Respiratory: Respirations even and unlabored  Cardiovascular: Peripheral pulses intact; no edema  Musculoskeletal Exam: ttp lumbar paraspinal right    ASA Score: 3    Patient/Chart Verification  Patient ID Verified: Verbal  ID Band Applied: No  Consents Confirmed: To be obtained in the Pre-Procedure area  H&P( within 30 days) Verified: To be obtained in the Pre-Procedure area  Interval H&P(within 24 hr) Complete (required for Outpatients and Surgery Admit only): To be obtained in the Pre-Procedure area  Allergies Reviewed: Yes  Anticoag/NSAID held?: Yes (eliquis x3 days)  Currently on antibiotics?: No  Pregnancy denied?: NA    Assessment:   1   Lumbar radiculopathy        Plan: right L5-S1 LESI

## 2023-02-17 ENCOUNTER — EVALUATION (OUTPATIENT)
Dept: PHYSICAL THERAPY | Age: 73
End: 2023-02-17

## 2023-02-17 DIAGNOSIS — M54.16 LUMBAR RADICULOPATHY: Primary | ICD-10-CM

## 2023-02-17 NOTE — PROGRESS NOTES
PT Evaluation     Today's date: 2023  Patient name: Faith Hernandez  : 1950  MRN: [id number]  Referring provider: Samuel Holm MD  Dx:   Encounter Diagnosis     ICD-10-CM    1  Lumbar radiculopathy  M54 16           Start Time: 0800  Stop Time: 0900  Total time in clinic (min): 60 minutes    Assessment  Assessment details: Faith Hernandez is a 67 y o  male who presents with low back pain with right leg symptoms  Due to these impairments, Patient has difficulty performing a/iadls  Patient's clinical presentation is consistent with their referring diagnosis of lumbar radiculopathy  Patient would benefit from skilled physical therapy to address their aforementioned impairments, improve their level of function and to improve their overall quality of life  Impairments: abnormal gait, abnormal muscle firing, abnormal muscle tone, abnormal or restricted ROM, abnormal movement, activity intolerance, impaired physical strength, lacks appropriate home exercise program, pain with function, weight-bearing intolerance, poor posture  and poor body mechanics  Understanding of Dx/Px/POC: good   Prognosis: fair    Goals  ST-3 WEEKS  1  Decrease pain by 2 points on VAS at its worst   2   Increase ROM by > 5 deg in all deficients planes  3   Increase CORE/LE by 1/2 MMT grade in all deficient planes  LT-6 WEEKS  1  Patient to be independent with a/iadls especially with walking, sitting and standing  2  Increase functional activities for leisure and home activities to previous LOF    3  Independent with HEP and/or fitness program     Plan  Patient would benefit from: skilled physical therapy  Planned modality interventions: cryotherapy, electrical stimulation/Russian stimulation, thermotherapy: hydrocollator packs and unattended electrical stimulation  Planned therapy interventions: activity modification, behavior modification, body mechanics training, aquatic therapy, flexibility, functional ROM exercises, home exercise program, IADL retraining, joint mobilization, manual therapy, neuromuscular re-education, patient education, postural training, strengthening, stretching, therapeutic activities and therapeutic exercise  Frequency: 2x week (2-3x week)  Duration in weeks: 12  Plan of Care beginning date: 2023  Plan of Care expiration date: 2023  Treatment plan discussed with: patient        Subjective Evaluation    History of Present Illness  Date of onset: 2020  Mechanism of injury: Low back pain x 2 years secondary to working , hospitalized, currently complains of back pain with right radiculopathy, no  Change with injections          Not a recurrent problem   Quality of life: good    Pain  Current pain ratin  At best pain ratin  At worst pain rating: 10  Quality: knife-like and radiating  Relieving factors: change in position  Aggravating factors: sitting, standing, walking, stair climbing and lifting  Progression: no change    Social Support  Steps to enter house: yes  Stairs in house: yes   Lives in: multiple-level home  Lives with: significant other      Diagnostic Tests  X-ray: abnormal  MRI studies: abnormal  Treatments  Previous treatment: injection treatment  Patient Goals  Patient goals for therapy: decreased pain, increased motion, increased strength and independence with ADLs/IADLs          Objective     Static Posture     Lumbar Spine   Increased lordosis  Palpation   Left   Hypertonic in the erector spinae  Right   Hypertonic in the erector spinae and lumbar paraspinals  Tenderness of the erector spinae and lumbar paraspinals  Tenderness     Lumbar Spine  Tenderness in the spinous process       Active Range of Motion   Cervical/Thoracic Spine       Thoracic    Extension:  Restriction level: moderate    Lumbar   Flexion: 60 degrees   Extension: 20 degrees   Left lateral flexion: 25 degrees       Right lateral flexion: 30 degrees     Strength/Myotome Testing Left Hip   Planes of Motion   Flexion: 4+  Extension: 4+  Abduction: 4+  Adduction: 4+    Right Hip   Planes of Motion   Flexion: 4+  Extension: 4  Abduction: 4+  Adduction: 4+    Left Knee   Flexion: 4+  Extension: 4+    Right Knee   Flexion: 4+  Extension: 4    Left Ankle/Foot   Dorsiflexion: 4+  Plantar flexion: 4+    Right Ankle/Foot   Dorsiflexion: 4+  Plantar flexion: 4+    Additional Strength Details  CORE is 3/5    Tests     Lumbar     Left   Negative passive SLR and slump test      Right   Negative passive SLR and slump test      Ambulation     Observational Gait   Decreased walking speed and stride length       Functional Assessment        Single Leg Stance   Left: 11 seconds  Right: 9 seconds            Precautions: CHF, lung CA+, HTN    Daily Treatment Diary     Manual                                                                                   Exercise Diary  2/17            Water walking 5            Postural training             Gait training             Home exercise pgm/patient education             Wall: t/h raises 1            Hip abd/add 2            Marching 1            squats 1            Knee flex/ext             Step-ups (fwd/bkwd/ss)             SLS (eyes open/closed)             SLS w UE mvmt  AROM/ball toss             Weight shifting             UE Noodle work x 4              UE AROM             Resistive UE work (paddles, bells, TB)             Core work on noodle (sitting/stdg)             Sit on noodle with movement             Seated on pool bench w proper posture             Ankle df/pf             marching             Hip Ab/add             Knee flex/ext             Deep water mvmt 5            Deep water tx/stretching 5            Specific self - stretches wall/steps 5                Modalities  2/17            whirlpool 5

## 2023-02-22 ENCOUNTER — APPOINTMENT (OUTPATIENT)
Dept: PHYSICAL THERAPY | Age: 73
End: 2023-02-22

## 2023-02-24 ENCOUNTER — APPOINTMENT (OUTPATIENT)
Dept: PHYSICAL THERAPY | Age: 73
End: 2023-02-24

## 2023-03-07 NOTE — PROGRESS NOTES
Pain Medicine Follow-Up Note    Assessment:  1  Lumbar facet arthropathy    2  Lumbar radiculopathy    3  Neural foraminal stenosis of lumbar spine        Plan:  Orders Placed This Encounter   Procedures   • FL spine and pain procedure     Standing Status:   Future     Standing Expiration Date:   3/8/2027     Scheduling Instructions:      Please schedule asap     Order Specific Question:   Reason for Exam:     Answer:   RIGHT L4 and L5 TFESI-depo     Order Specific Question:   Anticoagulant hold needed? Answer:   eliquis   • MRI lumbar spine without contrast     Standing Status:   Future     Standing Expiration Date:   3/8/2027     Scheduling Instructions: There is no preparation for this test  Please leave your jewelry and valuables at home, wedding rings are the exception  All patients will be required to change into a hospital gown and pants  Street clothes are not permitted in the MRI  Magnetic nail polish must be removed prior to arrival for your test  Please bring your insurance cards, a form of photo ID and a list of your medications with you  Arrive 15 minutes prior to your appointment time in order to register  Please bring any prior CT or MRI studies of this area that were not performed at a [facility]  To schedule this appointment, please contact Central Scheduling at [phone]  Prior to your appointment, please make sure you complete the MRI Screening Form when you e-Check in for your appointment  This will be available starting 7 days before your appointment in [address]  You may receive an e-mail with an activation code if you do not have a [employer] account  If you do not have access to a device, we will complete your screening at your appointment  Order Specific Question:   What is the patient's sedation requirement?      Answer:   No Sedation     Order Specific Question:   Release to patient through [employer]     Answer:   Immediate     Order Specific Question:   Is order priority selected as STAT? Answer:   No     Order Specific Question:   Reason for Exam (FREE TEXT)     Answer:   lumbar radiculopathy right L5   • Ambulatory referral to Orthopedic Surgery     Standing Status:   Future     Standing Expiration Date:   3/8/2024     Referral Priority:   Routine     Referral Type:   Consult - AMB     Referral Reason:   Specialty Services Required     Referred to Provider:   Ed Reddy MD     Requested Specialty:   Orthopedic Surgery     Number of Visits Requested:   1     Expiration Date:   3/7/2024       No orders of the defined types were placed in this encounter  My impressions and treatment recommendations were discussed in detail with the patient who verbalized understanding and had no further questions  This is a 70-year-old male who returns her office having recent right L5-S1 lumbar epidural steroid injection with ongoing severe right lower extremity pain secondary to nerve compression from foraminal stenosis  He has also right foot dorsiflexion weakness on exam today  Last injection was not as helpful as previous injections  It appears that his clinical situation is getting worse  We will order updated MRI of the lumbar spine to assess for significant advancement in his condition  We will schedule him for transforaminal approach to try to better distribute the medication  We will perform it at the left L4 and L5 levels  He does have notable axial back pain likely secondary to facet disease  There is the opportunity for medial branch block and radiofrequency ablation if he continues to have notable axial back pain  Although the patient does have history of polyneuropathy, the distribution of his pain in a dermatomal fashion as well as significant improvement with previous epidural steroid injection points towards spinal nerve compression as a source of his symptoms    Following injection and MRI, I would like for patient to establish care with Dr Niki Barraza for further evaluation  [place] Prescription Drug Monitoring Program report was reviewed and was appropriate     Complete risks and benefits including bleeding, infection, tissue reaction, nerve injury and allergic reaction were discussed  The approach was demonstrated using models and literature was provided  Verbal and written consent was obtained  Discharge instructions were provided  I personally saw and examined the patient and I agree with the above discussed plan of care  History of Present Illness:    Charmaine Leigh is a 67 y o  male who presents to [facility] for interval re-evaluation of the above stated pain complaints  The patient has a past medical and chronic pain history as outlined in the assessment section  He was last seen on 1/10/23 for repeat right L5-S1 lesi with only about 6 weeks of notable improvement  Today with worsening symptoms  Pain score 9 out of 10  Pain is constant, burning, sharp, pins-and-needles  Other than as stated above, the patient denies any interval changes in medications, medical condition, mental condition, symptoms, or allergies since the last office visit  Review of Systems:    Review of Systems   Respiratory: Negative for shortness of breath  Cardiovascular: Negative for chest pain  Gastrointestinal: Negative for constipation, diarrhea, nausea and vomiting  Musculoskeletal: Positive for gait problem and myalgias  Negative for arthralgias and joint swelling  Decreased ROM   Swollen R  Hand    Skin: Negative for rash  Neurological: Negative for dizziness, seizures and weakness  All other systems reviewed and are negative          Past Medical History:   Diagnosis Date   • Asthma    • Cancer ([address] )     Lung Cancer   • CHF (congestive heart failure) (HCC)    • Chronic kidney disease    • Colon polyp    • Disease of thyroid gland    • Fatty liver    • Heart disease    • Hyperlipidemia • Hypertension    • Kidney stone    • Nonischemic cardiomyopathy ([facility] [address] )     last assessed 17; resolved 16       Past Surgical History:   Procedure Laterality Date   • CARDIAC CATHETERIZATION      his ablation   • CATARACT EXTRACTION, BILATERAL Bilateral 10/01/2020   • COLONOSCOPY     • CYSTOSCOPY     • FRACTURE SURGERY     • LIPOMA RESECTION     • LUNG CANCER SURGERY     • ORTHOPEDIC SURGERY     • ROTATOR CUFF REPAIR         Family History   Adopted: Yes   Problem Relation Age of Onset   • No Known Problems Mother    • No Known Problems Father        Social History     Occupational History   • Occupation: retired   Tobacco Use   • Smoking status: Former     Packs/day: 3 00     Years: 25 00     Pack years: 75 00     Types: Cigarettes, Cigars     Quit date: 1998     Years since quittin 2   • Smokeless tobacco: Never   Vaping Use   • Vaping Use: Never used   Substance and Sexual Activity   • Alcohol use:  Yes     Alcohol/week: 14 0 standard drinks     Types: 3 Glasses of wine, 7 Cans of beer, 4 Shots of liquor per week     Comment: with dinner   • Drug use: Never   • Sexual activity: Not Currently     Partners: Male         Current Outpatient Medications:   •  albuterol (PROVENTIL HFA,VENTOLIN HFA) 90 mcg/act inhaler, Inhale 1 puff every 4 (four) hours as needed for wheezing, Disp: 1 Inhaler, Rfl: 6  •  amLODIPine (NORVASC) 10 mg tablet, Take 1 tablet (10 mg total) by mouth daily, Disp: , Rfl:   •  atorvastatin (LIPITOR) 10 mg tablet, Take 1 tablet (10 mg total) by mouth daily, Disp: 90 tablet, Rfl: 3  •  dexamethasone (DECADRON) 6 mg tablet, TAKE 1 TABLET BY MOUTH ONCE DAILY WITH BREAKFAST FOR 7 DOSES, Disp: , Rfl:   •  fluticasone (FLONASE) 50 mcg/act nasal spray, 1 spray into each nostril daily, Disp: 1 Bottle, Rfl: 3  •  glycopyrrolate (ROBINUL) 1 mg tablet, Take 1 mg by mouth as needed in the morning and 1 mg as needed in the evening , Disp: , Rfl:   •  levothyroxine 100 mcg tablet, , Disp: , Rfl:   •  lisinopril (ZESTRIL) 20 mg tablet, Take 1 tablet (20 mg total) by mouth 2 (two) times a day, Disp: 180 tablet, Rfl: 3  •  metoprolol tartrate (LOPRESSOR) 50 mg tablet, 1 tab twice a day, Disp: 180 tablet, Rfl: 3  •  nitroglycerin (Nitrostat) 0 4 mg SL tablet, Place 1 tablet (0 4 mg total) under the tongue every 5 (five) minutes as needed for chest pain, Disp: 25 tablet, Rfl: 3  •  pantoprazole (PROTONIX) 40 mg tablet, Take 1 tablet (40 mg total) by mouth daily, Disp: 30 tablet, Rfl: 1  •  predniSONE 5 mg tablet, Take by mouth as needed , Disp: , Rfl:   •  rOPINIRole (REQUIP) 0 5 mg tablet, Take 1 tablet (0 5 mg total) by mouth daily at bedtime, Disp: 90 tablet, Rfl: 3  •  tamsulosin (FLOMAX) 0 4 mg, TAKE 1 CAPSULE BY MOUTH ONCE DAILY WITH  DINNER, Disp: 90 capsule, Rfl: 3  •  torsemide (DEMADEX) 20 mg tablet, Take 1 tablet (20 mg total) by mouth daily, Disp: 30 tablet, Rfl: 5  •  Trelegy Ellipta 100-62 5-25 MCG/INH inhaler, Inhale 1 puff daily, Disp: 1 Inhaler, Rfl: 5  •  amiodarone 200 mg tablet, Take 1 tablet (200 mg total) by mouth daily, Disp: 90 tablet, Rfl: 3  •  amoxicillin-clavulanate (AUGMENTIN) 500-125 mg per tablet, TAKE 1 TABLET BY MOUTH THREE TIMES DAILY FOR 7 DAYS (Patient not taking: Reported on 12/2/2022), Disp: , Rfl:   •  apixaban (ELIQUIS) 5 mg, Take 1 tablet (5 mg total) by mouth 2 (two) times a day, Disp: 60 tablet, Rfl: 11  •  donepezil (ARICEPT) 10 mg tablet, Take 1 tablet (10 mg total) by mouth daily at bedtime, Disp: 90 tablet, Rfl: 3  •  levothyroxine 75 mcg tablet, Take 1 tablet (75 mcg total) by mouth daily, Disp: 90 tablet, Rfl: 3  •  montelukast (SINGULAIR) 10 mg tablet, Take 1 tablet (10 mg total) by mouth daily, Disp: 90 tablet, Rfl: 3    Allergies   Allergen Reactions   • Pollen Extract Itching and Sneezing       Physical Exam:    /77 (BP Location: Left arm, Patient Position: Sitting, Cuff Size: Standard)   Pulse 64   Ht 5' 6" (1 676 m)   Wt 86 6 kg (191 lb)   BMI 30 83 kg/m²     Constitutional:normal, well developed, well nourished, alert, in no distress and non-toxic and no overt pain behavior    Eyes:anicteric  HEENT:grossly intact  Neck:supple, symmetric, trachea midline and no masses   Pulmonary:even and unlabored  Cardiovascular:No edema or pitting edema present  Skin:Normal without rashes or lesions and well hydrated  Psychiatric:Mood and affect appropriate  Neurologic:Cranial Nerves II-XII grossly intact  Musculoskeletal:normal      Imaging  FL spine and pain procedure    (Results Pending)   MRI lumbar spine without contrast    (Results Pending)         Orders Placed This Encounter   Procedures   • FL spine and pain procedure   • MRI lumbar spine without contrast   • Ambulatory referral to Orthopedic Surgery

## 2023-03-07 NOTE — H&P (VIEW-ONLY)
Pain Medicine Follow-Up Note    Assessment:  1  Lumbar facet arthropathy    2  Lumbar radiculopathy    3  Neural foraminal stenosis of lumbar spine        Plan:  Orders Placed This Encounter   Procedures   • FL spine and pain procedure     Standing Status:   Future     Standing Expiration Date:   3/8/2027     Scheduling Instructions:      Please schedule asap     Order Specific Question:   Reason for Exam:     Answer:   RIGHT L4 and L5 TFESI-depo     Order Specific Question:   Anticoagulant hold needed? Answer:   eliquis   • MRI lumbar spine without contrast     Standing Status:   Future     Standing Expiration Date:   3/8/2027     Scheduling Instructions: There is no preparation for this test  Please leave your jewelry and valuables at home, wedding rings are the exception  All patients will be required to change into a hospital gown and pants  Street clothes are not permitted in the MRI  Magnetic nail polish must be removed prior to arrival for your test  Please bring your insurance cards, a form of photo ID and a list of your medications with you  Arrive 15 minutes prior to your appointment time in order to register  Please bring any prior CT or MRI studies of this area that were not performed at a [facility]  To schedule this appointment, please contact Central Scheduling at [phone]  Prior to your appointment, please make sure you complete the MRI Screening Form when you e-Check in for your appointment  This will be available starting 7 days before your appointment in [address]  You may receive an e-mail with an activation code if you do not have a [facility] [employer] [facility] account  If you do not have access to a device, we will complete your screening at your appointment  Order Specific Question:   What is the patient's sedation requirement?      Answer:   No Sedation     Order Specific Question:   Release to patient through [employer]     Answer:   Immediate     Order Specific Question:   Is order priority selected as STAT? Answer:   No     Order Specific Question:   Reason for Exam (FREE TEXT)     Answer:   lumbar radiculopathy right L5   • Ambulatory referral to Orthopedic Surgery     Standing Status:   Future     Standing Expiration Date:   3/8/2024     Referral Priority:   Routine     Referral Type:   Consult - AMB     Referral Reason:   Specialty Services Required     Referred to Provider:   Tammie Rapp MD     Requested Specialty:   Orthopedic Surgery     Number of Visits Requested:   1     Expiration Date:   3/7/2024       No orders of the defined types were placed in this encounter  My impressions and treatment recommendations were discussed in detail with the patient who verbalized understanding and had no further questions  This is a 72-year-old male who returns her office having recent right L5-S1 lumbar epidural steroid injection with ongoing severe right lower extremity pain secondary to nerve compression from foraminal stenosis  He has also right foot dorsiflexion weakness on exam today  Last injection was not as helpful as previous injections  It appears that his clinical situation is getting worse  We will order updated MRI of the lumbar spine to assess for significant advancement in his condition  We will schedule him for transforaminal approach to try to better distribute the medication  We will perform it at the left L4 and L5 levels  He does have notable axial back pain likely secondary to facet disease  There is the opportunity for medial branch block and radiofrequency ablation if he continues to have notable axial back pain  Although the patient does have history of polyneuropathy, the distribution of his pain in a dermatomal fashion as well as significant improvement with previous epidural steroid injection points towards spinal nerve compression as a source of his symptoms    Following injection and MRI, I would like for patient to establish care with Dr Grace Munoz for further evaluation  [place] Prescription Drug Monitoring Program report was reviewed and was appropriate     Complete risks and benefits including bleeding, infection, tissue reaction, nerve injury and allergic reaction were discussed  The approach was demonstrated using models and literature was provided  Verbal and written consent was obtained  Discharge instructions were provided  I personally saw and examined the patient and I agree with the above discussed plan of care  History of Present Illness:    Lola Hutchinson is a 67 y o  male who presents to [facility] for interval re-evaluation of the above stated pain complaints  The patient has a past medical and chronic pain history as outlined in the assessment section  He was last seen on 1/10/23 for repeat right L5-S1 lesi with only about 6 weeks of notable improvement  Today with worsening symptoms  Pain score 9 out of 10  Pain is constant, burning, sharp, pins-and-needles  Other than as stated above, the patient denies any interval changes in medications, medical condition, mental condition, symptoms, or allergies since the last office visit  Review of Systems:    Review of Systems   Respiratory: Negative for shortness of breath  Cardiovascular: Negative for chest pain  Gastrointestinal: Negative for constipation, diarrhea, nausea and vomiting  Musculoskeletal: Positive for gait problem and myalgias  Negative for arthralgias and joint swelling  Decreased ROM   Swollen R  Hand    Skin: Negative for rash  Neurological: Negative for dizziness, seizures and weakness  All other systems reviewed and are negative          Past Medical History:   Diagnosis Date   • Asthma    • Cancer ([address] )     Lung Cancer   • CHF (congestive heart failure) (HCC)    • Chronic kidney disease    • Colon polyp    • Disease of thyroid gland    • Fatty liver    • Heart disease    • Hyperlipidemia • Hypertension    • Kidney stone    • Nonischemic cardiomyopathy ([facility] [address] )     last assessed 17; resolved 16       Past Surgical History:   Procedure Laterality Date   • CARDIAC CATHETERIZATION      his ablation   • CATARACT EXTRACTION, BILATERAL Bilateral 10/01/2020   • COLONOSCOPY     • CYSTOSCOPY     • FRACTURE SURGERY     • LIPOMA RESECTION     • LUNG CANCER SURGERY     • ORTHOPEDIC SURGERY     • ROTATOR CUFF REPAIR         Family History   Adopted: Yes   Problem Relation Age of Onset   • No Known Problems Mother    • No Known Problems Father        Social History     Occupational History   • Occupation: retired   Tobacco Use   • Smoking status: Former     Packs/day: 3 00     Years: 25 00     Pack years: 75 00     Types: Cigarettes, Cigars     Quit date: 1998     Years since quittin 2   • Smokeless tobacco: Never   Vaping Use   • Vaping Use: Never used   Substance and Sexual Activity   • Alcohol use:  Yes     Alcohol/week: 14 0 standard drinks     Types: 3 Glasses of wine, 7 Cans of beer, 4 Shots of liquor per week     Comment: with dinner   • Drug use: Never   • Sexual activity: Not Currently     Partners: Male         Current Outpatient Medications:   •  albuterol (PROVENTIL HFA,VENTOLIN HFA) 90 mcg/act inhaler, Inhale 1 puff every 4 (four) hours as needed for wheezing, Disp: 1 Inhaler, Rfl: 6  •  amLODIPine (NORVASC) 10 mg tablet, Take 1 tablet (10 mg total) by mouth daily, Disp: , Rfl:   •  atorvastatin (LIPITOR) 10 mg tablet, Take 1 tablet (10 mg total) by mouth daily, Disp: 90 tablet, Rfl: 3  •  dexamethasone (DECADRON) 6 mg tablet, TAKE 1 TABLET BY MOUTH ONCE DAILY WITH BREAKFAST FOR 7 DOSES, Disp: , Rfl:   •  fluticasone (FLONASE) 50 mcg/act nasal spray, 1 spray into each nostril daily, Disp: 1 Bottle, Rfl: 3  •  glycopyrrolate (ROBINUL) 1 mg tablet, Take 1 mg by mouth as needed in the morning and 1 mg as needed in the evening , Disp: , Rfl:   •  levothyroxine 100 mcg tablet, , Disp: , Rfl:   •  lisinopril (ZESTRIL) 20 mg tablet, Take 1 tablet (20 mg total) by mouth 2 (two) times a day, Disp: 180 tablet, Rfl: 3  •  metoprolol tartrate (LOPRESSOR) 50 mg tablet, 1 tab twice a day, Disp: 180 tablet, Rfl: 3  •  nitroglycerin (Nitrostat) 0 4 mg SL tablet, Place 1 tablet (0 4 mg total) under the tongue every 5 (five) minutes as needed for chest pain, Disp: 25 tablet, Rfl: 3  •  pantoprazole (PROTONIX) 40 mg tablet, Take 1 tablet (40 mg total) by mouth daily, Disp: 30 tablet, Rfl: 1  •  predniSONE 5 mg tablet, Take by mouth as needed , Disp: , Rfl:   •  rOPINIRole (REQUIP) 0 5 mg tablet, Take 1 tablet (0 5 mg total) by mouth daily at bedtime, Disp: 90 tablet, Rfl: 3  •  tamsulosin (FLOMAX) 0 4 mg, TAKE 1 CAPSULE BY MOUTH ONCE DAILY WITH  DINNER, Disp: 90 capsule, Rfl: 3  •  torsemide (DEMADEX) 20 mg tablet, Take 1 tablet (20 mg total) by mouth daily, Disp: 30 tablet, Rfl: 5  •  Trelegy Ellipta 100-62 5-25 MCG/INH inhaler, Inhale 1 puff daily, Disp: 1 Inhaler, Rfl: 5  •  amiodarone 200 mg tablet, Take 1 tablet (200 mg total) by mouth daily, Disp: 90 tablet, Rfl: 3  •  amoxicillin-clavulanate (AUGMENTIN) 500-125 mg per tablet, TAKE 1 TABLET BY MOUTH THREE TIMES DAILY FOR 7 DAYS (Patient not taking: Reported on 12/2/2022), Disp: , Rfl:   •  apixaban (ELIQUIS) 5 mg, Take 1 tablet (5 mg total) by mouth 2 (two) times a day, Disp: 60 tablet, Rfl: 11  •  donepezil (ARICEPT) 10 mg tablet, Take 1 tablet (10 mg total) by mouth daily at bedtime, Disp: 90 tablet, Rfl: 3  •  levothyroxine 75 mcg tablet, Take 1 tablet (75 mcg total) by mouth daily, Disp: 90 tablet, Rfl: 3  •  montelukast (SINGULAIR) 10 mg tablet, Take 1 tablet (10 mg total) by mouth daily, Disp: 90 tablet, Rfl: 3    Allergies   Allergen Reactions   • Pollen Extract Itching and Sneezing       Physical Exam:    /77 (BP Location: Left arm, Patient Position: Sitting, Cuff Size: Standard)   Pulse 64   Ht 5' 6" (1 676 m)   Wt 86 6 kg (191 lb)   BMI 30 83 kg/m²     Constitutional:normal, well developed, well nourished, alert, in no distress and non-toxic and no overt pain behavior    Eyes:anicteric  HEENT:grossly intact  Neck:supple, symmetric, trachea midline and no masses   Pulmonary:even and unlabored  Cardiovascular:No edema or pitting edema present  Skin:Normal without rashes or lesions and well hydrated  Psychiatric:Mood and affect appropriate  Neurologic:Cranial Nerves II-XII grossly intact  Musculoskeletal:normal      Imaging  FL spine and pain procedure    (Results Pending)   MRI lumbar spine without contrast    (Results Pending)         Orders Placed This Encounter   Procedures   • FL spine and pain procedure   • MRI lumbar spine without contrast   • Ambulatory referral to Orthopedic Surgery

## 2023-03-08 ENCOUNTER — OFFICE VISIT (OUTPATIENT)
Dept: PAIN MEDICINE | Facility: CLINIC | Age: 73
End: 2023-03-08

## 2023-03-08 ENCOUNTER — TELEPHONE (OUTPATIENT)
Dept: RADIOLOGY | Facility: CLINIC | Age: 73
End: 2023-03-08

## 2023-03-08 VITALS
DIASTOLIC BLOOD PRESSURE: 77 MMHG | HEIGHT: 66 IN | SYSTOLIC BLOOD PRESSURE: 155 MMHG | WEIGHT: 191 LBS | HEART RATE: 64 BPM | BODY MASS INDEX: 30.7 KG/M2

## 2023-03-08 DIAGNOSIS — M47.816 LUMBAR FACET ARTHROPATHY: Primary | ICD-10-CM

## 2023-03-08 DIAGNOSIS — M54.16 LUMBAR RADICULOPATHY: ICD-10-CM

## 2023-03-08 DIAGNOSIS — M48.061 NEURAL FORAMINAL STENOSIS OF LUMBAR SPINE: ICD-10-CM

## 2023-03-08 NOTE — TELEPHONE ENCOUNTER
Caller: Jean Marie garcias    Doctor: sarah    Reason for call: returning a missed call    Call back#: [phone]

## 2023-03-09 NOTE — TELEPHONE ENCOUNTER
Dr Florentino Mercedes,    This mutual patient is to be scheduled for a Transforaminal Epidural Steroid injection  He will need to hold his Eliquis for 3 days prior and will be able to resume post procedure, day of    Do you give permission for this hold?   If granted, it will be valid for 60 days    Thank you

## 2023-03-09 NOTE — TELEPHONE ENCOUNTER
S/W wife, Lauro Pac and scheduled pt for 4/4/23 at 1 arrival  Advised LD Eliquis is 3/31  Advised  needed  Nothing to eat or drink one hour prior  Loose fitting comfortable pants without and belts/zippers    Denies any vaccinations and reminded not to get any 2 weeks prior or 2 weeks after    Advised to call and cancel if sick or put on ABX for any infection  CB from now until procedure with any questions

## 2023-03-10 ENCOUNTER — TELEPHONE (OUTPATIENT)
Dept: RADIOLOGY | Facility: CLINIC | Age: 73
End: 2023-03-10

## 2023-03-10 NOTE — TELEPHONE ENCOUNTER
----- Message from Noemi Baez MA sent at 8/22/1987  7:38 AM EST -----  Regarding: Hold  Per Dr Donis Berrios, patient is rescheduled to 3/16/23 @ 1:30 pm, please contact patient for hold  Thank you

## 2023-03-10 NOTE — TELEPHONE ENCOUNTER
S/w wife and advised of 3/16 appt  Advised last dose of eliquis will be 3/12  Pt will hold 3/13,3/14 and 3/15 and resume post procedure 3/16  Wife verbalized understanding

## 2023-03-16 ENCOUNTER — HOSPITAL ENCOUNTER (OUTPATIENT)
Dept: RADIOLOGY | Facility: CLINIC | Age: 73
Discharge: HOME | End: 2023-03-16

## 2023-03-16 VITALS
DIASTOLIC BLOOD PRESSURE: 90 MMHG | HEART RATE: 65 BPM | RESPIRATION RATE: 18 BRPM | OXYGEN SATURATION: 97 % | SYSTOLIC BLOOD PRESSURE: 157 MMHG | TEMPERATURE: 98.1 F

## 2023-03-16 DIAGNOSIS — M54.16 LUMBAR RADICULOPATHY: ICD-10-CM

## 2023-03-16 DIAGNOSIS — M48.061 NEURAL FORAMINAL STENOSIS OF LUMBAR SPINE: ICD-10-CM

## 2023-03-16 RX ORDER — METHYLPREDNISOLONE ACETATE 80 MG/ML
80 INJECTION, SUSPENSION INTRA-ARTICULAR; INTRALESIONAL; INTRAMUSCULAR; PARENTERAL; SOFT TISSUE ONCE
Status: COMPLETED | OUTPATIENT
Start: 2023-03-16 | End: 2023-03-16

## 2023-03-16 RX ORDER — BUPIVACAINE HCL/PF 2.5 MG/ML
2 VIAL (ML) INJECTION ONCE
Status: COMPLETED | OUTPATIENT
Start: 2023-03-16 | End: 2023-03-16

## 2023-03-16 RX ADMIN — METHYLPREDNISOLONE ACETATE 80 MG: 80 INJECTION, SUSPENSION INTRA-ARTICULAR; INTRALESIONAL; INTRAMUSCULAR; PARENTERAL; SOFT TISSUE at 13:58

## 2023-03-16 RX ADMIN — IOHEXOL 1 ML: 300 INJECTION, SOLUTION INTRAVENOUS at 13:57

## 2023-03-16 RX ADMIN — Medication 2 ML: at 13:58

## 2023-03-16 NOTE — DISCHARGE INSTR - LAB
Epidural Steroid Injection   WHAT YOU NEED TO KNOW:   An epidural steroid injection (BIANCA) is a procedure to inject steroid medicine into the epidural space  The epidural space is between your spinal cord and vertebrae  Steroids reduce inflammation and fluid buildup in your spine that may be causing pain  You may be given pain medicine along with the steroids  ACTIVITY  Do not drive or operate machinery today  No strenuous activity today - bending, lifting, etc   You may resume normal activites starting tomorrow - start slowly and as tolerated  You may shower today, but no tub baths or hot tubs  You may have numbness for several hours from the local anesthetic  Please use caution and common sense, especially with weight-bearing activities  CARE OF THE INJECTION SITE  If you have soreness or pain, apply ice to the area today (20 minutes on/20 minutes off)  Starting tomorrow, you may use warm, moist heat or ice if needed  You may have an increase or change in your discomfort for 36-48 hours after your treatment  Apply ice and continue with any pain medication you have been prescribed  Notify the [facility] if you have any of the following: redness, drainage, swelling, headache, stiff neck or fever above 100°F     SPECIAL INSTRUCTIONS  Our office will contact you in approximately 7 days for a progress report  MEDICATIONS  Continue to take all routine medications  Our office may have instructed you to hold some medications  As no general anesthesia was used in today's procedure, you should not experience any side effects related to anesthesia  If you are diabetic, the steroids used in today's injection may temporarily increase your blood sugar levels after the first few days after your injection  Please keep a close eye on your sugars and alert the doctor who manages your diabetes if your sugars are significantly high from your baseline or you are symptomatic       If you have a problem specifically related to your procedure, please call our office at [phone]  Problems not related to your procedure should be directed to your primary care physician

## 2023-03-16 NOTE — INTERVAL H&P NOTE
Update: (This section must be completed if the H&P was completed greater than 24 hrs to procedure or admission)    H&P reviewed  After examining the patient, I find no changed to the H&P since it had been written  Patient re-evaluated   Accept as history and physical     Eric Osborne MD/March 16, 2023/1:41 PM

## 2023-04-03 ENCOUNTER — HOSPITAL ENCOUNTER (OUTPATIENT)
Dept: MRI IMAGING | Facility: CLINIC | Age: 73
Discharge: HOME | End: 2023-04-03

## 2023-04-03 DIAGNOSIS — M54.16 LUMBAR RADICULOPATHY: ICD-10-CM

## 2023-04-03 DIAGNOSIS — M48.061 NEURAL FORAMINAL STENOSIS OF LUMBAR SPINE: ICD-10-CM

## 2023-04-06 ENCOUNTER — TELEPHONE (OUTPATIENT)
Dept: RADIOLOGY | Facility: CLINIC | Age: 73
End: 2023-04-06

## 2023-04-06 NOTE — TELEPHONE ENCOUNTER
----- Message from Anne-Marie Jack MD sent at 4/5/2023  3:27 PM EDT -----  MRI shows arthritis throughout the back, most notable in the lower lumbar area  He has narrowing where the nerves are exiting which is severe in some areas  He never let us know how he did after right L4 and L5 TFESI which was done last month

## 2023-05-31 ENCOUNTER — OFFICE VISIT (OUTPATIENT)
Dept: CARDIOLOGY CLINIC | Facility: CLINIC | Age: 73
End: 2023-05-31

## 2023-05-31 VITALS
RESPIRATION RATE: 16 BRPM | DIASTOLIC BLOOD PRESSURE: 80 MMHG | HEART RATE: 67 BPM | HEIGHT: 66 IN | WEIGHT: 180 LBS | BODY MASS INDEX: 28.93 KG/M2 | OXYGEN SATURATION: 99 % | SYSTOLIC BLOOD PRESSURE: 142 MMHG

## 2023-05-31 DIAGNOSIS — I42.9 CARDIOMYOPATHY, UNSPECIFIED TYPE (HCC): ICD-10-CM

## 2023-05-31 DIAGNOSIS — I48.0 PAF (PAROXYSMAL ATRIAL FIBRILLATION) (HCC): ICD-10-CM

## 2023-05-31 DIAGNOSIS — I50.22 CHRONIC SYSTOLIC HEART FAILURE (HCC): ICD-10-CM

## 2023-05-31 DIAGNOSIS — Z79.01 CHRONIC ANTICOAGULATION: ICD-10-CM

## 2023-05-31 DIAGNOSIS — Z79.899 ON AMIODARONE THERAPY: ICD-10-CM

## 2023-05-31 DIAGNOSIS — R00.2 HEART PALPITATIONS: Primary | ICD-10-CM

## 2023-05-31 NOTE — PROGRESS NOTES
[facility] [address] [address]  Cardiology Follow Up    Jesus Mckeon  1950  [id number]      1  Heart palpitations  Holter monitor      2  Cardiomyopathy, unspecified type ([place] [address] )        3  PAF (paroxysmal atrial fibrillation) ([place] [address] )        4  Chronic anticoagulation        5  On amiodarone therapy        6  Chronic systolic heart failure [facility])            Chief Complaint   Patient presents with   • Follow-up       Interval History: Patient presents for follow-up visit  Patient does have a history of nonischemic cardiomyopathy and paroxysmal atrial fibrillation maintained on amiodarone prophylaxis as well as Eliquis for anticoagulation  Patient does have some shortness of breath with exertion which is stable  No history of leg edema orthopnea PND  Patient denies any bleeding issues        Patient Active Problem List   Diagnosis   • Anxiety associated with depression   • Chronic obstructive pulmonary disease (HCC)   • Congestive heart failure (HCC)   • Coronary artery disease   • High cholesterol   • Hypertension, essential   • Insomnia   • Lyme borreliosis   • Polyarthropathy   • Neuropathy   • Myopathy   • Inflammatory arthropathy   • Health care maintenance   • Prostate cancer screening   • Colon cancer screening   • 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[place] [address] )   • Malignant neoplasm of lower lobe of right lung (HCC)   • Chronic bilateral low back pain   • Protein-calorie malnutrition (HCC)   • Atrial fibrillation (HCC)   • PMR (polymyalgia rheumatica) (HCC)     Past Medical History:   Diagnosis Date   • Asthma    • Cancer ([place] [address] )     Lung Cancer   • CHF (congestive heart failure) (HCC)    • Chronic kidney disease    • Colon polyp    • Disease of thyroid gland    • Fatty liver    • Heart disease    • Hyperlipidemia    • Hypertension    • Kidney stone    • Nonischemic cardiomyopathy ([place] [address] )     last assessed 6/21/17; resolved 9/27/16     Social History     Socioeconomic History   • Marital status: /Civil Union     Spouse name: Not on file   • Number of children: Not on file   • Years of education: Not on file   • Highest education level: Not on file   Occupational History   • Occupation: retired   Tobacco Use   • Smoking status: Former     Packs/day: 3 00     Years: 25 00     Total pack years: 75 00     Types: Cigarettes, Cigars     Quit date: 1998     Years since quittin 5   • Smokeless tobacco: Never   Vaping Use   • Vaping Use: Never used   Substance and Sexual Activity   • Alcohol use:  Yes     Alcohol/week: 14 0 standard drinks of alcohol     Types: 3 Glasses of wine, 7 Cans of beer, 4 Shots of liquor per week     Comment: with dinner   • Drug use: Never   • Sexual activity: Not Currently     Partners: Male   Other Topics Concern   • Not on file   Social History Narrative    Disabled        No advance directives     Social Determinants of Health     Financial Resource Strain: Not on file   Food Insecurity: Not on file   Transportation Needs: Not on file   Physical Activity: Inactive (2021)    Exercise Vital Sign    • Days of Exercise per Week: 0 days    • Minutes of Exercise per Session: 0 min   Stress: Stress Concern Present (2021)    [address]    • Feeling of Stress : Rather much   Social Connections: Not on file   Intimate Partner Violence: Not on file   Housing Stability: Not on file      Family History   Adopted: Yes   Problem Relation Age of Onset   • No Known Problems Mother    • No Known Problems Father      Past Surgical History:   Procedure Laterality Date   • CARDIAC CATHETERIZATION      his ablation   • CATARACT EXTRACTION, BILATERAL Bilateral 10/01/2020   • COLONOSCOPY     • CT NEEDLE BIOPSY LUNG  2020   • CYSTOSCOPY     • FRACTURE SURGERY     • LIPOMA RESECTION     • LUNG CANCER SURGERY     • ORTHOPEDIC SURGERY     • ROTATOR CUFF REPAIR         Current Outpatient Medications:   •  albuterol (PROVENTIL HFA,VENTOLIN HFA) 90 mcg/act inhaler, Inhale 1 puff every 4 (four) hours as needed for wheezing, Disp: 1 Inhaler, Rfl: 6  •  amiodarone 200 mg tablet, Take 1 tablet (200 mg total) by mouth daily, Disp: 90 tablet, Rfl: 3  •  amLODIPine (NORVASC) 10 mg tablet, Take 1 tablet (10 mg total) by mouth daily, Disp: , Rfl:   •  apixaban (ELIQUIS) 5 mg, Take 1 tablet (5 mg total) by mouth 2 (two) times a day, Disp: 60 tablet, Rfl: 11  •  atorvastatin (LIPITOR) 10 mg tablet, Take 1 tablet (10 mg total) by mouth daily, Disp: 90 tablet, Rfl: 3  •  dexamethasone (DECADRON) 6 mg tablet, TAKE 1 TABLET BY MOUTH ONCE DAILY WITH BREAKFAST FOR 7 DOSES, Disp: , Rfl:   •  donepezil (ARICEPT) 10 mg tablet, Take 1 tablet (10 mg total) by mouth daily at bedtime, Disp: 90 tablet, Rfl: 3  •  fluticasone (FLONASE) 50 mcg/act nasal spray, 1 spray into each nostril daily, Disp: 1 Bottle, Rfl: 3  •  glycopyrrolate (ROBINUL) 1 mg tablet, Take 1 mg by mouth as needed in the morning and 1 mg as needed in the evening , Disp: , Rfl:   •  levothyroxine 100 mcg tablet, , Disp: , Rfl:   •  lisinopril (ZESTRIL) 20 mg tablet, Take 1 tablet by mouth twice daily, Disp: 60 tablet, Rfl: 1  •  metoprolol tartrate (LOPRESSOR) 50 mg tablet, 1 tab twice a day, Disp: 180 tablet, Rfl: 3  •  montelukast (SINGULAIR) 10 mg tablet, Take 1 tablet (10 mg total) by mouth daily, Disp: 90 tablet, Rfl: 3  •  nitroglycerin (Nitrostat) 0 4 mg SL tablet, Place 1 tablet (0 4 mg total) under the tongue every 5 (five) minutes as needed for chest pain, Disp: 25 tablet, Rfl: 3  •  pantoprazole (PROTONIX) 40 mg tablet, Take 1 tablet (40 mg total) by mouth daily, Disp: 30 tablet, Rfl: 1  •  predniSONE 5 mg tablet, Take by mouth as needed , Disp: , Rfl:   •  rOPINIRole (REQUIP) 0 5 mg tablet, Take 1 tablet (0 5 mg total) by mouth daily at bedtime, Disp: 90 tablet, Rfl: 3  •  tamsulosin (FLOMAX) 0 4 mg, TAKE 1 CAPSULE BY MOUTH ONCE DAILY WITH "DINNER, Disp: 90 capsule, Rfl: 3  •  torsemide (DEMADEX) 20 mg tablet, Take 1 tablet (20 mg total) by mouth daily, Disp: 30 tablet, Rfl: 5  •  Trelegy Ellipta 100-62 5-25 MCG/INH inhaler, Inhale 1 puff daily, Disp: 1 Inhaler, Rfl: 5  Allergies   Allergen Reactions   • Pollen Extract Itching and Sneezing       Labs:  No visits with results within 2 Month(s) from this visit  Latest known visit with results is:   Appointment on 08/07/2021   Component Date Value   • NT-proBNP 08/07/2021 4,335 (H)    • Hepatitis C Ab 08/07/2021 Non-reactive    • D-Dimer, Quant 08/07/2021 1 27 (H)    • Cholesterol 08/07/2021 187    • Triglycerides 08/07/2021 141    • HDL, Direct 08/07/2021 57    • LDL Calculated 08/07/2021 102 (H)    • Non-HDL-Chol (CHOL-HDL) 08/07/2021 130      Imaging: No results found  Review of Systems:  Review of Systems   REVIEW OF SYSTEMS:  Constitutional:  Denies fever or chills   Eyes:  Denies change in visual acuity   HENT:  Denies nasal congestion or sore throat   Respiratory:  shortness of breath   Cardiovascular:  Denies chest pain or edema   GI:  Denies abdominal pain, nausea, vomiting, bloody stools or diarrhea   :  Denies dysuria, frequency, difficulty in micturition and nocturia  Musculoskeletal:  Denies back pain or joint pain   Neurologic:  Denies headache, focal weakness or sensory changes   Endocrine:  Denies polyuria or polydipsia   Lymphatic:  Denies swollen glands   Psychiatric:  Denies depression or anxiety    Physical Exam:    /80 (BP Location: Left arm, Patient Position: Sitting, Cuff Size: Standard)   Pulse 67   Resp 16   Ht 5' 6\" (1 676 m)   Wt 81 6 kg (180 lb)   SpO2 99%   BMI 29 05 kg/m²     Physical Exam   PHYSICAL EXAM:  General:  Patient is not in acute distress   Head: Normocephalic, Atraumatic  HEENT:  Both pupils normal-size atraumatic, normocephalic, nonicteric  Neck:  JVP not raised   Trachea central  No carotid bruit  Respiratory:  normal breath sounds no " crackles  no rhonchi  Cardiovascular:  Regular rate and rhythm no S3 no murmurs  GI:  Abdomen soft nontender  No organomegaly  Lymphatic:  No cervical or inguinal lymphadenopathy  Neurologic:  Patient is awake alert, oriented   Grossly nonfocal  Extremities no edema    Limited echocardiogram in September 2022 done at [facility] showed ejection fraction of 45 to 50%  Patient has blood work pending from his primary care physician  Discussion/Summary:  Patient with multiple medical problems who seems to be doing reasonably well from cardiac standpoint  Previous studies reviewed with patient  Medications reviewed and possible side effects discussed  concepts of cardiovascular disease , signs and symptoms of heart disease  Dietary and risk factor modification reinforced  All questions answered  Safety measures reviewed  Patient advised to report any problems prompting medical attention  Patient does have symptoms of palpitations  Patient will be scheduled for Holter monitor to assess for any significant arrhythmias  Continue all present medications  Blood work through primary care physician  Patient also has history of lung cancer status post surgery and followed by pulmonary  Risks and benefits  and alternatives of anticoagulation to prevent thromboembolic risk from atrial fibrillation discussed at length  Patient to report any bleeding issues  Follow-up in 6 months or earlier as needed  Patient is agreeable with the plan of care  Patient had a few questions which were answered

## 2023-06-02 ENCOUNTER — HOSPITAL ENCOUNTER (OUTPATIENT)
Dept: NON INVASIVE DIAGNOSTICS | Facility: CLINIC | Age: 73
Discharge: HOME | End: 2023-06-02

## 2023-06-02 DIAGNOSIS — R00.2 HEART PALPITATIONS: ICD-10-CM

## 2023-06-13 ENCOUNTER — TELEPHONE (OUTPATIENT)
Dept: CARDIOLOGY CLINIC | Facility: CLINIC | Age: 73
End: 2023-06-13

## 2023-06-13 NOTE — TELEPHONE ENCOUNTER
----- Message from Ruddy Gomez MD sent at 6/13/2023  8:31 AM EDT -----  Please call the patient  Holter monitor shows no significant arrhythmias  Sinus rhythm throughout

## 2023-07-06 DIAGNOSIS — I10 HYPERTENSION, ESSENTIAL: ICD-10-CM

## 2023-07-06 NOTE — TELEPHONE ENCOUNTER
Name from pharmacy: Lisinopril 20 MG Oral Tablet          Will file in chart as: lisinopril (ZESTRIL) 20 mg tablet    Sig: TAKE 1 TABLET BY MOUTH TWICE DAILY. NEED OFFICE VISIT PRIOR TO NEXT REFILL    Disp:  60 tablet    Refills:  0    Start: 7/6/2023    Class: Normal    Non-formulary For: Hypertension, essential    Last ordered: 3 months ago (3/27/2023) by RAMIRO Caceres    Last refill: 4/24/2023    Rx #: [id number]    Cardiovascular:  ACE Inhibitors Failed 07/06/2023 09:00 AM   Protocol Details  K is 5.3 or below and within 360 days    eGFR is 60 or above and within 360 days    BP completed in the last 6 months    Valid encounter within last 6 months      To be filled at: [facility], [address]   Please review and refill if possible  Thanks!

## 2023-07-07 RX ORDER — LISINOPRIL 20 MG/1
TABLET ORAL
Qty: 60 TABLET | Refills: 0 | Status: SHIPPED | OUTPATIENT
Start: 2023-07-07

## 2023-07-28 DIAGNOSIS — I10 HYPERTENSION, ESSENTIAL: ICD-10-CM

## 2023-07-28 RX ORDER — LISINOPRIL 20 MG/1
TABLET ORAL
Qty: 60 TABLET | Refills: 0 | OUTPATIENT
Start: 2023-07-28

## 2023-08-31 DIAGNOSIS — I10 HYPERTENSION, ESSENTIAL: ICD-10-CM

## 2023-08-31 RX ORDER — LISINOPRIL 20 MG/1
TABLET ORAL
Qty: 60 TABLET | Refills: 0 | Status: SHIPPED | OUTPATIENT
Start: 2023-08-31

## 2023-08-31 NOTE — TELEPHONE ENCOUNTER
Name from pharmacy: Lisinopril 20 MG Oral Tablet         Will file in chart as: lisinopril (ZESTRIL) 20 mg tablet    Sig: TAKE 1 TABLET BY MOUTH TWICE DAILY. NEED OFFICE VISIT PRIOR TO NEXT REFILL    Original sig: TAKE 1 TABLET BY MOUTH TWICE DAILY NEED  OFFICE  VISIT  PRIOR  TO  NEXT  REFILL    Disp:  60 tablet    Refills:  0    Start: 8/31/2023    Class: Normal    Non-formulary For: Hypertension, essential    Last ordered: 1 month ago (7/7/2023) by Arjun Lizama MD    Last refill: 7/8/2023    Rx #: [id number]    Cardiovascular:  ACE Inhibitors Failed 08/31/2023 12:52 PM   Protocol Details  K is 5.3 or below and within 360 days    eGFR is 60 or above and within 360 days    BP completed in the last 6 months    Valid encounter within last 6 months      To be filled at: [address]   Please review and refill if possible  Thanks!

## 2023-09-21 DIAGNOSIS — I10 HYPERTENSION, ESSENTIAL: ICD-10-CM

## 2023-09-21 RX ORDER — LISINOPRIL 20 MG/1
TABLET ORAL
Qty: 60 TABLET | Refills: 4 | Status: SHIPPED | OUTPATIENT
Start: 2023-09-21

## 2024-02-12 ENCOUNTER — OFFICE VISIT (OUTPATIENT)
Dept: CARDIOLOGY CLINIC | Facility: CLINIC | Age: 74
End: 2024-02-12
Payer: MEDICARE

## 2024-02-12 VITALS
BODY MASS INDEX: 28.61 KG/M2 | WEIGHT: 178 LBS | OXYGEN SATURATION: 97 % | HEART RATE: 66 BPM | SYSTOLIC BLOOD PRESSURE: 140 MMHG | DIASTOLIC BLOOD PRESSURE: 82 MMHG | HEIGHT: 66 IN | RESPIRATION RATE: 16 BRPM

## 2024-02-12 DIAGNOSIS — I50.22 CHRONIC SYSTOLIC HEART FAILURE (HCC): ICD-10-CM

## 2024-02-12 DIAGNOSIS — Z79.899 ON AMIODARONE THERAPY: ICD-10-CM

## 2024-02-12 DIAGNOSIS — I10 HYPERTENSION, ESSENTIAL: ICD-10-CM

## 2024-02-12 DIAGNOSIS — N18.32 STAGE 3B CHRONIC KIDNEY DISEASE (HCC): ICD-10-CM

## 2024-02-12 DIAGNOSIS — R06.02 SHORTNESS OF BREATH: ICD-10-CM

## 2024-02-12 DIAGNOSIS — I25.10 CORONARY ARTERY DISEASE INVOLVING NATIVE CORONARY ARTERY OF NATIVE HEART WITHOUT ANGINA PECTORIS: ICD-10-CM

## 2024-02-12 DIAGNOSIS — R00.2 PALPITATIONS: ICD-10-CM

## 2024-02-12 DIAGNOSIS — I48.0 PAF (PAROXYSMAL ATRIAL FIBRILLATION) (HCC): ICD-10-CM

## 2024-02-12 DIAGNOSIS — E78.2 MIXED HYPERLIPIDEMIA: ICD-10-CM

## 2024-02-12 DIAGNOSIS — I42.9 CARDIOMYOPATHY, UNSPECIFIED TYPE (HCC): Primary | ICD-10-CM

## 2024-02-12 PROCEDURE — 99214 OFFICE O/P EST MOD 30 MIN: CPT | Performed by: INTERNAL MEDICINE

## 2024-02-12 RX ORDER — DULOXETIN HYDROCHLORIDE 60 MG/1
60 CAPSULE, DELAYED RELEASE ORAL DAILY
COMMUNITY
Start: 2023-11-26

## 2024-02-12 NOTE — PROGRESS NOTES
CARDIOLOGY OFFICE VISIT  [facility]  [address] [place] [address], [place] [id number]  Tel: [phone]      NAME: Ravi Gregory  AGE: 73 y.o.  SEX: male  : 1950  MRN: [id number]      Chief Complaint:  Chief Complaint   Patient presents with    Follow-up   Follow-Up    Assessment and Plan:    Palpitations  2 week live ambulatory monitor ordered    Shortness of Breath  Clinically euvolemic on exam today  Echo ordered    Nonischemic Cardiomyopathy  Echo : EF 40 to 50% on echo 2022.  Appears euvolemic on exam.  See #4.  Medication Regiment Includes:   Beta Blocker: Lopressor 50 mg twice daily  ACEI/ARB/ARNI: Continue lisinopril 20mg BID  Strongly encourage low sodium DASH diet.     Chronic systolic heart failure  Etiology: Nonischemic cardiomyopathy.  See #3  Appears euvolemic on exam  Medication Regiment Includes:   Diuretic: Continue torsemide 20 mg daily.  Beta Blocker: Lopressor 50 mg twice daily  ACEI/ARB/ARNI: Continue lisinopril 20mg BID    Strongly encourage low sodium DASH diet.   Recommend checking weight daily. Please call our office if you notice a 3lb gain in one day or 5lb gain over 1 week.  Advised patient to take an extra torsemide dose for a few days if he notices weight gain of 3 pounds in a day.    Paroxysmal atrial fibrillation on chronic anticoagulation with Eliquis  - anticoagulated with Eliquis / [id number] score of 4.  Discussed with patient okay to hold off on Eliquis for few days until bleeding resolves, then Eliquis should be resumed.  - EF of 40-50% per echo 2022 / mild dilation of left atrium noted  - rate control: Continue Lopressor 50 mg twice daily  - antiarrhythmic therapy: Continue amiodarone 200 mg daily.  Recent TSH and LFTs 2024 within normal limits.  PFTs monitored by pulmonology at [facility].    Coronary Artery Disease  Continue statin, metoprolol, as needed nitro.    Hypertension  Continue amlodipine 10 mg  daily, Lopressor 50 mg twice daily, lisinopril 20 mg twice daily    Hyperlipidemia  Continue atorvastatin 10 mg daily    Recommended he discuss the nosebleeds, hematochezia, and abdominal pain with his primary care provider.  Hemoglobin stable.  Please return for follow-up in 6 months or sooner as needed.     History of Present Illness:     Ravi rGegory is a 73 y.o. male with PMHx of nonischemic cardiomyopathy, PAF on Eliquis, chronic systolic heart failure, CAD, HTN, PMR, lung cancer s/p surgery, Lyme Disease, HLD, COPD  who presents for routine follow-up.  He states he is still experiencing the heart palpitations but he complained about his last appointment in our office.  He states the palpitations have not changed since the last appointment.  He states he has been experiencing episodic shortness of breath, dizziness, difficulty breathing, chest discomfort/tightness that only occurs during the night.  He states it wakes him up out of his sleep, and usually goes away after 3 minutes.  He states his inhaler does not help with the symptoms.  He states this has been going on for 6 months, and occurs about 5 times a week.  He has not found any alleviating factors.  He endorses orthopnea.  He denies lower extremity edema.  He states he checks his weight daily and denies weight gain.      He additionally endorses significant nosebleeds that have been occurring for about 1 month.  He states these nosebleeds last about 10 minutes.  He endorses hematochezia that has been occurring for the past 3 days.  He endorses abdominal pain that he attributes to his diverticulitis.  He stopped taking his Eliquis 3 days ago due to the bleeding.  He states he has episodes of bleeding similar to this about once every 2 months where he will stop his Eliquis for about 3 to 4 days and then resume it.    He denies exertional pain/pressure/discomfort.  He states he experiences shortness of breath and dizziness with activity, but this has been  "going on for several years with no worsening or changes.  He denies syncopal episodes.    Recent Cardiac Work-Up:  Holter 6/2023:   The patient was in normal sinus rhythm with IVCD throughout the study.   Occasional ventricular ectopy- 3.1% of total beats  Symptoms of \"dizzy\" correlated with NSR and sinus tachycardia with PVCs.   Echo 9/2022:    Left Ventricle: There is mild hypertrophy. Systolic function is mildly   decreased with an ejection fraction of 40-50%.     Left Atrium: Left atrium cavity is mildly dilated.   Cath 3/2020  CORONARY CIRCULATION:  There was mild to moderate 1-vessel coronary artery disease ( 50 % RCA).  The coronary circulation is co-dominant.  Left main: The vessel was normal sized. Angiography showed no evidence of disease.  LAD: The vessel was large sized. Angiography showed minor luminal irregularities. There was one major diagonal branch.  Circumflex: The vessel was large sized (co-dominant). Angiography showed minor luminal irregularities. There was one major obtuse marginal.  Ramus intermedius: The vessel was medium sized. Angiography showed mild atherosclerosis.  Ostial ramus intermedius: There was a 30 % stenosis.  RCA: The vessel was medium sized (co-dominant). Angiography showed a single discrete lesion.  Mid RCA: There was a 50 % stenosis. There was GREGG grade 3 flow through the vessel (brisk flow).    Diet/Exercise: Cooks healthy food per patient, eats once per day. Keeps salt intake low. Doing pulmonary rehab.       Review of Systems:   Review of Systems   Constitutional:  Negative for diaphoresis, fever and unexpected weight change.   HENT:  Positive for nosebleeds. Negative for ear pain and sore throat.    Eyes:  Negative for pain and redness.   Respiratory:  Positive for chest tightness and shortness of breath. Negative for cough.    Cardiovascular:  Positive for palpitations. Negative for chest pain and leg swelling.   Gastrointestinal:  Positive for abdominal pain and blood " "in stool. Negative for nausea and vomiting.   Genitourinary:  Negative for dysuria and hematuria.   Skin:  Negative for color change and rash.   Neurological:  Positive for dizziness and light-headedness. Negative for syncope.   Hematological:  Bruises/bleeds easily.   Psychiatric/Behavioral:  Negative for agitation and behavioral problems.    All other systems reviewed and are negative.        Vitals:  Vitals:    02/12/24 1014   BP: 140/82   BP Location: Left arm   Patient Position: Sitting   Cuff Size: Standard   Pulse: 66   Resp: 16   SpO2: 97%   Weight: 80.7 kg (178 lb)   Height: 5' 6\" (1.676 m)        Body mass index is 28.73 kg/m².    Weight (last 2 days)       Date/Time Weight    02/12/24 1014 80.7 (178)              Physical Exam:   GEN: Alert and oriented x 3, in no acute distress.  Well appearing and well nourished.   HEENT: Sclera anicteric, conjunctivae pink, mucous membranes moist. Oropharynx clear.   NECK: Supple, no carotid bruits, no significant JVD. Trachea midline, no thyromegaly.   HEART: Regular rhythm, normal S1 and S2, no murmurs, clicks, gallops or rubs. PMI nondisplaced, no thrills.   LUNGS: Bilateral wheezing and rhonchi to auscultation.  No rales appreciated.  No increased work of breathing or signs of respiratory distress.   EXTREMITIES: Skin warm and well perfused, no clubbing, cyanosis, or edema.  NEURO: No focal findings. Normal speech. Mood and affect normal.   SKIN: Normal without suspicious lesions on exposed skin.  No pallor noted.      Previous cardiac studies reviewed      Past Medical History:  Past Medical History:   Diagnosis Date    Asthma     Cancer (HCC)     Lung Cancer    CHF (congestive heart failure) (HCC)     Chronic kidney disease     Colon polyp     Disease of thyroid gland     Fatty liver     Heart disease     Hyperlipidemia     Hypertension     Kidney stone     Nonischemic cardiomyopathy (HCC)     last assessed 6/21/17; resolved 9/27/16         Past Surgical " History:  Past Surgical History:   Procedure Laterality Date    CARDIAC CATHETERIZATION      his ablation    CATARACT EXTRACTION, BILATERAL Bilateral 10/01/2020    COLONOSCOPY      CT NEEDLE BIOPSY LUNG  2020    CYSTOSCOPY      FRACTURE SURGERY      LIPOMA RESECTION      LUNG CANCER SURGERY      ORTHOPEDIC SURGERY      ROTATOR CUFF REPAIR           Family History:  Family History   Adopted: Yes   Problem Relation Age of Onset    No Known Problems Mother     No Known Problems Father          Social History:  Social History     Socioeconomic History    Marital status: /Civil Union     Spouse name: None    Number of children: None    Years of education: None    Highest education level: None   Occupational History    Occupation: retired   Tobacco Use    Smoking status: Former     Current packs/day: 0.00     Average packs/day: 3.0 packs/day for 25.0 years (75.0 ttl pk-yrs)     Types: Cigarettes, Cigars     Start date: 1973     Quit date: 1998     Years since quittin.2    Smokeless tobacco: Never   Vaping Use    Vaping status: Never Used   Substance and Sexual Activity    Alcohol use: Yes     Alcohol/week: 14.0 standard drinks of alcohol     Types: 3 Glasses of wine, 7 Cans of beer, 4 Shots of liquor per week     Comment: with dinner    Drug use: Never    Sexual activity: Not Currently     Partners: Male   Other Topics Concern    None   Social History Narrative    Disabled        No advance directives     Social Determinants of Health     Financial Resource Strain: Low Risk  (2023)    Received from [facility]    Overall Financial Resource Strain (CARDIA)     Difficulty of Paying Living Expenses: Not very hard   Food Insecurity: Food Insecurity Present (2023)    Received from [facility]    Hunger Vital Sign     Worried About Running Out of Food in the Last Year: Sometimes true     Ran Out of Food in the Last Year: Never true   Transportation  Needs: No Transportation Needs (12/1/2023)    Received from [facility]    PRAPARE - Transportation     Lack of Transportation (Medical): No     Lack of Transportation (Non-Medical): No   Physical Activity: Inactive (4/5/2021)    Exercise Vital Sign     Days of Exercise per Week: 0 days     Minutes of Exercise per Session: 0 min   Stress: Stress Concern Present (5/21/2023)    Received from [facility]    [employer] [place] [employer] - Occupational Stress Questionnaire     Feeling of Stress : To some extent   Social Connections: Moderately Isolated (5/21/2023)    Received from [facility]    Social Connection and Isolation Panel [NHANES]     Frequency of Communication with Friends and Family: More than three times a week     Frequency of Social Gatherings with Friends and Family: More than three times a week     Attends [employer] Services: Never     Active Member of Clubs or Organizations: No     Attends Club or Organization Meetings: Patient declined     Marital Status:    Intimate Partner Violence: Not At Risk (12/1/2023)    Received from [facility]    Humiliation, Afraid, Rape, and Kick questionnaire     Fear of Current or Ex-Partner: No     Emotionally Abused: No     Physically Abused: No     Sexually Abused: No   Housing Stability: High Risk (12/1/2023)    Received from [facility]    Housing Stability Vital Sign     Unable to Pay for Housing in the Last Year: Yes     Number of Places Lived in the Last Year: 1     Unstable Housing in the Last Year: No         Active Problems:  Patient Active Problem List   Diagnosis    Anxiety associated with depression    Chronic obstructive pulmonary disease (HCC)    Congestive heart failure (HCC)    Coronary artery disease    High cholesterol    Hypertension, essential    Insomnia    Lyme borreliosis    Polyarthropathy    Neuropathy    Myopathy    Inflammatory arthropathy  "   Health care maintenance    Prostate cancer screening    Colon cancer screening    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HCC)    Malignant neoplasm of lower lobe of right lung (HCC)    Chronic bilateral low back pain    Protein-calorie malnutrition (HCC)    Atrial fibrillation (HCC)    PMR (polymyalgia rheumatica) (HCC)    Stage 3b chronic kidney disease (HCC)    Mixed hyperlipidemia         The following portions of the patient's history were reviewed and updated as appropriate: past medical history, past surgical history, past family history,  past social history, current medications, allergies and problem list.      Laboratory Results:  CBC with diff:   Lab Results   Component Value Date    WBC 11.74 (H) 01/27/2021    WBC 5.7 07/20/2015    RBC 4.37 01/27/2021    RBC 4.95 07/20/2015    HGB 12.2 01/27/2021    HGB 15.3 07/20/2015    HCT 38.7 01/27/2021    HCT 44.8 07/20/2015    MCV 89 01/27/2021    MCV 91 07/20/2015    MCH 27.9 01/27/2021    MCH 30.9 07/20/2015    RDW 14.2 01/27/2021    RDW 11.6 07/20/2015     01/27/2021     07/20/2015       CMP:  Lab Results   Component Value Date    CREATININE 1.87 (H) 02/08/2024    BUN 21 02/08/2024     (L) 07/20/2015    K 4.1 02/08/2024     02/08/2024    CO2 24 02/08/2024    GLUCOSE 104 07/20/2015    PROT 7.5 07/20/2015    ALKPHOS 73 02/08/2024    ALT 29 02/08/2024    AST 21 02/08/2024       Lab Results   Component Value Date    HGBA1C 5.4 09/15/2023    MG 2.1 12/18/2022    PHOS 4.9 12/20/2022       Lab Results   Component Value Date    CKTOTAL 109 06/15/2020    CKTOTAL 106 06/17/2019    CKTOTAL 100 12/17/2018       Lipid Profile:   No results found for: \"CHOL\"  Lab Results   Component Value Date    HDL 57 08/07/2021    HDL 47 02/17/2018    HDL 49 06/29/2017     Lab Results   Component Value Date    LDLCALC 102 (H) 08/07/2021    LDLCALC 159 (H) 02/17/2018    LDLCALC 164 (H) 06/29/2017     Lab Results   Component Value Date    TRIG 141 08/07/2021    TRIG 260 (H) 02/17/2018 "    TRIG 197 (H) 2017       Cardiac testing:   No results found for this or any previous visit.    No results found for this or any previous visit.    No results found for this or any previous visit.    No results found for this or any previous visit.    Results for orders placed during the hospital encounter of 20    NM myocardial perfusion spect (rx stress and/or rest)    [facility]  [address] [place] [address] [phone]    Rest/Stress Gated SPECT Myocardial Perfusion Imaging After Regadenoson    Patient: BRENNON QUNITERO  MR number: [id number]  Account number: [id number]  : 1950  Age: 69 years  Gender: Male  Status: Outpatient  Location: [facility]  Height: 65 in  Weight: 194 lb  BP: 166/ 84 mmHg    Allergies: POLLEN EXTRACT    Diagnosis: I25.10 - Atherosclerotic heart disease of native coronary artery without angina pectoris, Z01.810 - Encounter for preprocedural cardiovascular examination    Primary Physician:  Garth Leon MD  Referring Physician:  Lilly Durham MD  Technician:  Hermilo Gould BS, BA, AART(N)  Group:  [facility]  Other:  Felicia Ferguson MS, CCT  Interpreting Physician:  David Fierro MD    INDICATIONS: Evaluation of known coronary artery disease. Pre-operative risk assessment.    HISTORY: The patient is a 69 year old  male. Chest pain status: no chest pain. Coronary artery disease risk factors: hypertension. Cardiovascular history: coronary artery disease, congestive heart failure, and ischemic  cardiomyopathy.ischemic cardiomyopathy Co-morbidity: history of lung disease and obesity. Medications: a nitrate, a beta blocker, an ACE inhibitor/ARB, a diuretic, and aspirin, albuterol, Breo Ellipta, Celexa, Plaquenil, Singulair, Requip,  trazodone. Previous test results: abnormal ECG.    REST ECG: Sinus rhythm. PACs. RBBB.    PROCEDURE: The study was performed in the the [facility]  [facility]. A regadenoson infusion pharmacologic stress test was performed. Gated SPECT myocardial perfusion imaging was performed after stress and at rest. Systolic blood  pressure was 166 mmHg, at the start of the study. Diastolic blood pressure was 84 mmHg, at the start of the study. The heart rate was 73 bpm, at the start of the study. Patient was not experiencing chest pain at the time of the injection  of the radiopharmaceutical.  Regadenoson protocol:  HR bpm SBP mmHg DBP mmHg Symptoms ST change Rhythm/conduct  Baseline 73 166 84 none none NSR, frequent PAC's, RBBB  Immediate 96 154 78 moderate dyspnea -- NSR, occasional PAC's, RBBB  1 min 93 -- -- mild dyspnea -- NSR, occasional PAC's, RBBB  2 min 83 144 82 none -- NSR, rare PAC's, RBBB  No medications or fluids given.    STRESS SUMMARY: Duration of pharmacologic stress was 3 min. Maximal heart rate during stress was 96 bpm. The heart rate response to stress was normal. There was resting hypertension with an appropriate blood pressure response to stress.  The rate-pressure product for the peak heart rate and blood pressure was 06334. There was no chest pain during stress. The stress test was terminated due to protocol completion. The stress ECG was negative for ischemia. Arrhythmia during  stress: isolated atrial premature beats and isolated premature ventricular beats.    ISOTOPE ADMINISTRATION:  Resting isotope administration Stress isotope administration  Agent Sestamibi Sestamibi  Dose 10.81 mCi 32.9 mCi  Date 02/13/2020 02/13/2020  Injection-image interval 30 min 45 min    The radiopharmaceutical was injected at the peak effect of pharmacologic stress.    MYOCARDIAL PERFUSION IMAGING:  The image quality was fair. Rotating projection images reveal mild patient motion. The left ventricle was dilated.    PERFUSION DEFECTS:  -  There was a large, moderately severe, fixed myocardial perfusion defect of the entire inferior wall, basal inferolateral wall  and the apical cap segment of the LV.    GATED SPECT:  The calculated left ventricular ejection fraction was 27 %. Left ventricular ejection fraction was markedly decreased by visual estimate. There was severely reduced myocardial thickening and motion of the inferior wall of the left  ventricle.    SUMMARY:  -  Stress results: There was resting hypertension with an appropriate blood pressure response to stress. There was no chest pain during stress.  -  ECG conclusions: The stress ECG was negative for ischemia. Arrhythmia during stress: isolated atrial premature beats and isolated premature ventricular beats.  -  Perfusion imaging: There was a large, moderately severe, fixed myocardial perfusion defect of the entire inferior wall, basal inferolateral wall and the apical cap segment of the LV.  -  Gated SPECT: The calculated left ventricular ejection fraction was 27 %. Left ventricular ejection fraction was markedly decreased by visual estimate. There was severely reduced myocardial thickening and motion of the inferior wall of  the left ventricle.    IMPRESSIONS: Abnormal study after pharmacologic stress. No reversible perfusion defect was seen. There was a large, moderately severe, fixed myocardial perfusion defect of the entire inferior wall, basal inferolateral wall and the apical  cap segment of the LV. Left ventricular systolic function was reduced, with regional wall motion abnormalities.    Prepared and signed by    David Fierro MD  Signed 02/13/2020 16:52:55    No results found for this or any previous visit.    No results found for this or any previous visit.    Results for orders placed during the hospital encounter of 06/02/23    Holter monitor    Interpretation Summary  INDICATIONS: Palpitations    DESCRIPTION OF FINDINGS:  The patient was in normal sinus rhythm with IVCD throughout the study.    The average heart rate was 86 beats per minute. The heart rate ranged from 56 to 154 beats per  "minute.    Ventricular ectopic activity consisted of 6736 beats (3.1% of total beats). 70 were in couplets, 579 were in bigeminy, 35 were in trigeminy. There was no sustained or nonsustained ventricular tachycardia.    Supraventricular ectopic activity consisted of 120 beats (0.1% of total beats).  There was no supraventricular tachycardia identified.    There was no evidence of atrial fibrillation or atrial flutter.    There were no significant pauses. There was no evidence of advanced degree heart block.    Symptoms of \"dizzy\" correlated with NSR and sinus tachycardia with PVCs.    Impression:  The patient was in normal sinus rhythm with IVCD throughout the study.  Occasional ventricular ectopy- 3.1% of total beats  Symptoms as noted above.    -----  Dustin Livingston MD [facility]        Medications:    Current Outpatient Medications:     albuterol (PROVENTIL HFA,VENTOLIN HFA) 90 mcg/act inhaler, Inhale 1 puff every 4 (four) hours as needed for wheezing, Disp: 1 Inhaler, Rfl: 6    amiodarone 200 mg tablet, Take 1 tablet (200 mg total) by mouth daily, Disp: 90 tablet, Rfl: 3    amLODIPine (NORVASC) 10 mg tablet, Take 1 tablet (10 mg total) by mouth daily, Disp: , Rfl:     apixaban (ELIQUIS) 5 mg, Take 1 tablet (5 mg total) by mouth 2 (two) times a day, Disp: 60 tablet, Rfl: 11    atorvastatin (LIPITOR) 10 mg tablet, Take 1 tablet (10 mg total) by mouth daily, Disp: 90 tablet, Rfl: 3    dexamethasone (DECADRON) 6 mg tablet, TAKE 1 TABLET BY MOUTH ONCE DAILY WITH BREAKFAST FOR 7 DOSES, Disp: , Rfl:     donepezil (ARICEPT) 10 mg tablet, Take 1 tablet (10 mg total) by mouth daily at bedtime, Disp: 90 tablet, Rfl: 3    DULoxetine (CYMBALTA) 60 mg delayed release capsule, Take 60 mg by mouth daily, Disp: , Rfl:     fluticasone (FLONASE) 50 mcg/act nasal spray, 1 spray into each nostril daily, Disp: 1 Bottle, Rfl: 3    glycopyrrolate (ROBINUL) 1 mg tablet, Take 1 mg by mouth as needed in the morning and 1 mg as needed in the " evening., Disp: , Rfl:     levothyroxine 100 mcg tablet, , Disp: , Rfl:     lisinopril (ZESTRIL) 20 mg tablet, TAKE 1 TABLET BY MOUTH TWICE DAILY (OFFICE VISIT NEEDED PRIOR TO NEXT REFILL), Disp: 60 tablet, Rfl: 4    metoprolol tartrate (LOPRESSOR) 50 mg tablet, 1 tab twice a day, Disp: 180 tablet, Rfl: 3    montelukast (SINGULAIR) 10 mg tablet, Take 1 tablet (10 mg total) by mouth daily, Disp: 90 tablet, Rfl: 3    nitroglycerin (Nitrostat) 0.4 mg SL tablet, Place 1 tablet (0.4 mg total) under the tongue every 5 (five) minutes as needed for chest pain, Disp: 25 tablet, Rfl: 3    pantoprazole (PROTONIX) 40 mg tablet, Take 1 tablet (40 mg total) by mouth daily, Disp: 30 tablet, Rfl: 1    predniSONE 5 mg tablet, Take by mouth as needed , Disp: , Rfl:     rOPINIRole (REQUIP) 0.5 mg tablet, Take 1 tablet (0.5 mg total) by mouth daily at bedtime, Disp: 90 tablet, Rfl: 3    tamsulosin (FLOMAX) 0.4 mg, TAKE 1 CAPSULE BY MOUTH ONCE DAILY WITH  DINNER, Disp: 90 capsule, Rfl: 3    torsemide (DEMADEX) 20 mg tablet, Take 1 tablet by mouth once daily, Disp: 30 tablet, Rfl: 5    Trelegy Ellipta 100-62.5-25 MCG/INH inhaler, Inhale 1 puff daily, Disp: 1 Inhaler, Rfl: 5      Allergies:  Allergies   Allergen Reactions    Pollen Extract Itching and Sneezing         1. Cardiomyopathy, unspecified type (HCC)        2. PAF (paroxysmal atrial fibrillation) (HCC)  AMB event recorder      3. On amiodarone therapy        4. Stage 3b chronic kidney disease (HCC)        5. Chronic systolic heart failure (HCC)  Echo complete w/ contrast if indicated      6. Shortness of breath  Echo complete w/ contrast if indicated      7. Palpitations  AMB event recorder      8. Coronary artery disease involving native coronary artery of native heart without angina pectoris        9. Hypertension, essential        10. Mixed hyperlipidemia            Recommend aggressive risk factor modification and therapeutic lifestyle changes.  Low-salt, low-calorie,  low-fat, low-cholesterol diet with regular exercise and to optimize weight.  I will defer the ordering and monitoring of necessity lab studies to you, but I am available and happy to review and manage any of the data at your request in the future.    Discussed concepts of atherosclerosis, including signs and symptoms of cardiac disease.    Previous studies were reviewed.    Safety measures were reviewed.  Questions were entertained and answered.  Patient was advised to report any problems requiring medical attention.    Follow-up with PCP and appropriate specialist and lab work as discussed.    Return for follow up visit as scheduled or earlier, if needed.  Thank you for allowing me to participate in the care and evaluation of your patient.  Should you have any questions, please feel free to contact me.    Juan C Ragsdale PA-C  2/12/2024,12:27 PM

## 2024-02-16 ENCOUNTER — TELEPHONE (OUTPATIENT)
Dept: CARDIOLOGY CLINIC | Facility: CLINIC | Age: 74
End: 2024-02-16

## 2024-02-16 ENCOUNTER — HOSPITAL ENCOUNTER (OUTPATIENT)
Dept: NON INVASIVE DIAGNOSTICS | Facility: CLINIC | Age: 74
Discharge: HOME | End: 2024-02-16
Payer: MEDICARE

## 2024-02-16 VITALS
DIASTOLIC BLOOD PRESSURE: 82 MMHG | HEART RATE: 60 BPM | SYSTOLIC BLOOD PRESSURE: 140 MMHG | HEIGHT: 66 IN | WEIGHT: 178 LBS | BODY MASS INDEX: 28.61 KG/M2

## 2024-02-16 DIAGNOSIS — I50.22 CHRONIC SYSTOLIC HEART FAILURE (HCC): ICD-10-CM

## 2024-02-16 DIAGNOSIS — R06.02 SHORTNESS OF BREATH: ICD-10-CM

## 2024-02-16 LAB
AORTIC ROOT: 3.3 CM
APICAL FOUR CHAMBER EJECTION FRACTION: 43 %
ASCENDING AORTA: 3.1 CM
BSA FOR ECHO PROCEDURE: 1.9 M2
E WAVE DECELERATION TIME: 180 MS
E/A RATIO: 0.74
FRACTIONAL SHORTENING: 23 (ref 28–44)
INTERVENTRICULAR SEPTUM IN DIASTOLE (PARASTERNAL SHORT AXIS VIEW): 1 CM
INTERVENTRICULAR SEPTUM: 1 CM (ref 0.6–1.1)
LAAS-AP2: 20.2 CM2
LAAS-AP4: 20.3 CM2
LEFT ATRIUM SIZE: 4.5 CM
LEFT ATRIUM VOLUME (MOD BIPLANE): 59 ML
LEFT ATRIUM VOLUME INDEX (MOD BIPLANE): 31.1 ML/M2
LEFT INTERNAL DIMENSION IN SYSTOLE: 4.6 CM (ref 2.1–4)
LEFT VENTRICLE DIASTOLIC VOLUME (MOD BIPLANE): 145 ML
LEFT VENTRICLE DIASTOLIC VOLUME INDEX (MOD BIPLANE): 76.3 ML/M2
LEFT VENTRICLE SYSTOLIC VOLUME (MOD BIPLANE): 79 ML
LEFT VENTRICLE SYSTOLIC VOLUME INDEX (MOD BIPLANE): 41.6 ML/M2
LEFT VENTRICULAR INTERNAL DIMENSION IN DIASTOLE: 6 CM (ref 3.5–6)
LEFT VENTRICULAR POSTERIOR WALL IN END DIASTOLE: 1 CM
LEFT VENTRICULAR STROKE VOLUME: 83 ML
LV EF: 45 %
LVSV (TEICH): 83 ML
MV E'TISSUE VEL-SEP: 6 CM/S
MV PEAK A VEL: 1.36 M/S
MV PEAK E VEL: 101 CM/S
MV STENOSIS PRESSURE HALF TIME: 52 MS
MV VALVE AREA P 1/2 METHOD: 4.23
RIGHT ATRIUM AREA SYSTOLE A4C: 9.9 CM2
RIGHT VENTRICLE ID DIMENSION: 2.6 CM
SL CV LEFT ATRIUM LENGTH A2C: 5.8 CM
SL CV LV EF: 40
SL CV PED ECHO LEFT VENTRICLE DIASTOLIC VOLUME (MOD BIPLANE) 2D: 179 ML
SL CV PED ECHO LEFT VENTRICLE SYSTOLIC VOLUME (MOD BIPLANE) 2D: 96 ML
TRICUSPID ANNULAR PLANE SYSTOLIC EXCURSION: 2.2 CM

## 2024-02-16 PROCEDURE — 93306 TTE W/DOPPLER COMPLETE: CPT | Performed by: INTERNAL MEDICINE

## 2024-02-16 PROCEDURE — 93306 TTE W/DOPPLER COMPLETE: CPT

## 2024-02-16 NOTE — TELEPHONE ENCOUNTER
----- Message from Juan C Ragsdale PA-C sent at 2/16/2024  2:58 PM EST -----  Called patient, wife answered. Asked wife to have patient call our office to discuss Echo results and next steps with possible Rx stress test if patient is still symptomatic.

## 2024-02-20 ENCOUNTER — TELEPHONE (OUTPATIENT)
Dept: CARDIOLOGY CLINIC | Facility: CLINIC | Age: 74
End: 2024-02-20

## 2024-02-20 NOTE — TELEPHONE ENCOUNTER
Spoke with Chester from [facility] regarding pt BP and SOB.    Chester stated that pt was on the Thread [employer] and was experiencing SOB, and at the end of the session pt still wasn't feeling good.     Chester took patient BP and it was 190/94.    Chester advised pt to stay at the office due to his BP being high, patient left against medical advise.     Chester wanted pt Cardiologist to be aware.     Please advise

## 2024-02-21 DIAGNOSIS — I25.10 CAD (CORONARY ARTERY DISEASE): ICD-10-CM

## 2024-02-21 DIAGNOSIS — R06.02 SHORTNESS OF BREATH: Primary | ICD-10-CM

## 2024-02-21 PROBLEM — Z12.5 PROSTATE CANCER SCREENING: Status: RESOLVED | Noted: 2019-08-10 | Resolved: 2024-02-21

## 2024-02-21 PROBLEM — Z00.00 HEALTH CARE MAINTENANCE: Status: RESOLVED | Noted: 2019-08-10 | Resolved: 2024-02-21

## 2024-02-21 PROBLEM — Z12.11 COLON CANCER SCREENING: Status: RESOLVED | Noted: 2019-08-10 | Resolved: 2024-02-21

## 2024-02-21 NOTE — TELEPHONE ENCOUNTER
Called and lvm for pt relaying Annelyse PA-C response and to give the office a call back with any questions or concerns.

## 2024-02-21 NOTE — TELEPHONE ENCOUNTER
Please inform patient no significant change in his echo from prior.  Given continuation of symptoms, pharmacologic nuclear stress test ordered.  Please inform patient that this test was ordered.  Please assist patient in getting this test scheduled.  Thank you

## 2024-02-29 ENCOUNTER — CLINICAL SUPPORT (OUTPATIENT)
Dept: CARDIOLOGY CLINIC | Facility: CLINIC | Age: 74
End: 2024-02-29
Payer: MEDICARE

## 2024-02-29 DIAGNOSIS — I48.0 PAF (PAROXYSMAL ATRIAL FIBRILLATION) (HCC): ICD-10-CM

## 2024-02-29 DIAGNOSIS — R00.2 PALPITATIONS: ICD-10-CM

## 2024-02-29 PROCEDURE — 93228 REMOTE 30 DAY ECG REV/REPORT: CPT | Performed by: INTERNAL MEDICINE

## 2024-03-04 ENCOUNTER — HOSPITAL ENCOUNTER (OUTPATIENT)
Dept: NON INVASIVE DIAGNOSTICS | Facility: CLINIC | Age: 74
Discharge: HOME | End: 2024-03-04

## 2024-03-04 DIAGNOSIS — R06.02 SHORTNESS OF BREATH: ICD-10-CM

## 2024-03-15 ENCOUNTER — TELEPHONE (OUTPATIENT)
Dept: CARDIOLOGY CLINIC | Facility: CLINIC | Age: 74
End: 2024-03-15

## 2024-03-15 NOTE — TELEPHONE ENCOUNTER
----- Message from Juan C Ragsdale PA-C sent at 3/15/2024 12:00 PM EDT -----  Please let patient know there were no significant events recorded on his heart monitor. These results can additionally be further discussed at his next outpatient appointment. He should continue taking his medications as prescribed. Please advise patient to obtain his stress test.

## 2024-03-15 NOTE — TELEPHONE ENCOUNTER
Spoke with patient and he verbally understood result ,       Patient states he went in for the stress test and could not do test they     had to cancel    Please advise

## 2024-07-29 PROBLEM — D17.9 LIPOMA: Status: ACTIVE | Noted: 2018-07-26

## 2024-07-29 PROBLEM — N32.89 HYPERTROPHY OF BLADDER: Status: ACTIVE | Noted: 2021-11-07

## 2024-07-29 PROBLEM — R91.8 MULTIPLE NODULES OF LUNG: Status: ACTIVE | Noted: 2018-07-27

## 2024-07-29 PROBLEM — M35.3 POLYMYALGIA RHEUMATICA (HCC): Status: ACTIVE | Noted: 2021-07-09

## 2024-07-29 PROBLEM — J34.89 RHINORRHEA: Status: ACTIVE | Noted: 2023-02-03

## 2024-07-29 PROBLEM — G25.81 RESTLESS LEGS: Status: ACTIVE | Noted: 2020-08-06

## 2024-07-29 PROBLEM — Z85.118 PERSONAL HISTORY OF LUNG CANCER: Status: ACTIVE | Noted: 2024-07-29

## 2024-07-29 PROBLEM — H93.19 TINNITUS: Status: ACTIVE | Noted: 2018-07-26

## 2024-07-29 PROBLEM — C34.91 MALIGNANT NEOPLASM OF UNSPECIFIED PART OF RIGHT BRONCHUS OR LUNG (HCC): Chronic | Status: ACTIVE | Noted: 2022-02-04

## 2024-07-29 PROBLEM — J98.4 RESTRICTIVE LUNG DISEASE: Status: ACTIVE | Noted: 2023-02-03

## 2024-07-29 PROBLEM — R80.1 PERSISTENT PROTEINURIA: Chronic | Status: ACTIVE | Noted: 2022-02-04

## 2024-07-29 PROBLEM — I42.9 PRIMARY CARDIOMYOPATHY (HCC): Status: ACTIVE | Noted: 2018-07-26

## 2024-07-29 PROBLEM — N40.0 ENLARGED PROSTATE: Status: ACTIVE | Noted: 2020-04-29

## 2024-07-29 PROBLEM — J45.30 MILD PERSISTENT ASTHMA WITHOUT COMPLICATION: Status: ACTIVE | Noted: 2019-04-09

## 2024-07-29 PROBLEM — E66.3 OVERWEIGHT: Status: ACTIVE | Noted: 2018-07-26

## 2024-07-29 PROBLEM — I50.22 CHRONIC SYSTOLIC HEART FAILURE (HCC): Chronic | Status: ACTIVE | Noted: 2018-07-26

## 2024-07-29 PROBLEM — R20.2 PARESTHESIAS: Status: ACTIVE | Noted: 2023-02-07

## 2024-07-29 PROBLEM — Z86.010 HISTORY OF COLON POLYPS: Status: ACTIVE | Noted: 2022-07-21

## 2024-07-29 PROBLEM — N32.89 BLADDER WALL THICKENING: Status: ACTIVE | Noted: 2021-11-07

## 2024-07-29 PROBLEM — K63.9 COLONIC THICKENING: Status: ACTIVE | Noted: 2020-05-11

## 2024-07-29 PROBLEM — Z87.898 HISTORY OF BACTEREMIA: Status: ACTIVE | Noted: 2021-09-16

## 2024-07-29 PROBLEM — J30.1 SEASONAL ALLERGIC RHINITIS DUE TO POLLEN: Status: ACTIVE | Noted: 2024-06-10

## 2024-07-29 PROBLEM — R06.83 SNORING: Status: ACTIVE | Noted: 2020-01-20

## 2024-07-29 PROBLEM — Z79.52 CURRENT CHRONIC USE OF SYSTEMIC STEROIDS: Status: ACTIVE | Noted: 2024-06-10

## 2024-07-29 PROBLEM — Z86.0100 HISTORY OF COLON POLYPS: Status: ACTIVE | Noted: 2022-07-21

## 2024-07-29 PROBLEM — Z87.891 EX-SMOKER: Status: ACTIVE | Noted: 2019-12-05

## 2024-07-29 PROBLEM — D50.0 IRON DEFICIENCY ANEMIA DUE TO CHRONIC BLOOD LOSS: Status: ACTIVE | Noted: 2023-08-17

## 2024-07-29 PROBLEM — E03.9 ACQUIRED HYPOTHYROIDISM: Status: ACTIVE | Noted: 2020-05-20

## 2024-07-29 PROBLEM — N20.0 NEPHROLITHIASIS: Chronic | Status: ACTIVE | Noted: 2024-05-30

## 2024-07-29 PROBLEM — R97.20 HIGH PROSTATE SPECIFIC ANTIGEN (PSA): Status: ACTIVE | Noted: 2020-05-20

## 2024-07-29 PROBLEM — A69.20 LYME DISEASE: Status: ACTIVE | Noted: 2018-03-17

## 2024-07-29 PROBLEM — Z79.01 LONG TERM CURRENT USE OF ANTICOAGULANT THERAPY: Status: ACTIVE | Noted: 2019-12-16

## 2024-08-07 NOTE — H&P (VIEW-ONLY)
Pain Medicine Follow-Up Note    Assessment:  1. Foraminal stenosis of lumbar region    2. Lumbar radiculopathy    3. Sacroiliitis (HCC)        Plan:  Orders Placed This Encounter   Procedures    FL spine and pain procedure     Standing Status:   Future     Standing Expiration Date:   8/8/2028     Order Specific Question:   Reason for Exam:     Answer:   Bilateral L5 TFESI     Order Specific Question:   Anticoagulant hold needed?     Answer:   yes     My impressions and treatment recommendations were discussed in detail with the patient who verbalized understanding and had no further questions.      Patient returns the office with worsening bilateral low back pain that radiates down his left leg.  In the past the patient has had a lumbar epidural steroid injection as well as a transforaminal epidural steroid injection that the patient reports provided him approximately 14 months of pain relief.  Patient is wishing to be reevaluated for another injection.  On exam the patient does have bilateral sacroiliitis which is reproducible with multiple provocative maneuvers but also has lumbar radiculopathy bilaterally on exam.  I recommend that the patient have a bilateral L5 transforaminal epidural steroid injection.  If the patient's pain does not significantly decrease 2 weeks after the injection I would recommend that he have bilateral sacroiliac joint injections. Complete risks and benefits including bleeding, infection, tissue reaction, nerve injury and allergic reaction were discussed. The approach was demonstrated using models and literature was provided. Verbal and written consent was obtained.    Discussed oral medication options however patient declines any additional medications.    Follow-up is planned in 4 weeks after injection time or sooner as warranted.  Discharge instructions were provided. I personally saw and examined the patient and I agree with the above discussed plan of care.    History of Present  Illness:    Ravi Gregory is a 74 y.o. male who presents to [facility] for interval re-evaluation of the above stated pain complaints. The patient has a past medical and chronic pain history as outlined in the assessment section. He was last seen on 3/16/2023.    At today's visit patient states that their pain symptoms are the same with a pain score of 9/10 on the verbal numeric pain scale.  The patient was last seen on 3/16/2024 at that time he had a transforaminal epidural steroid injection that he reported excellent pain relief that lasted greater than a year.  The patient's pain is worse in the morning, evening, and at night.  The patient's pain is constant in nature.  And the quality of the patient's pain is described as sharp and numbness.  The patient's pain is located in the bilateral low back and left lower leg.     Other than as stated above, the patient denies any interval changes in medications, medical condition, mental condition, symptoms, or allergies since the last office visit.       Review of Systems:    Review of Systems   Respiratory:  Negative for shortness of breath.    Cardiovascular:  Negative for chest pain.   Gastrointestinal:  Negative for constipation, diarrhea, nausea and vomiting.   Musculoskeletal:  Positive for back pain, gait problem and myalgias. Negative for arthralgias and joint swelling.        DROM   Skin:  Negative for rash.   Neurological:  Positive for dizziness and numbness. Negative for seizures and weakness.   All other systems reviewed and are negative.        Past Medical History:   Diagnosis Date    Asthma     Cancer (HCC)     Lung Cancer    CHF (congestive heart failure) (HCC)     Chronic kidney disease     Colon polyp     Disease of thyroid gland     Fatty liver     Heart disease     History of cardiac cath 03/05/2020    mRCA 50%    Hyperlipidemia     Hypertension     Kidney stone     Nonischemic cardiomyopathy (HCC)     last assessed 6/21/17;  resolved 16       Past Surgical History:   Procedure Laterality Date    CARDIAC CATHETERIZATION  2020    mRCA 50%    CARDIAC ELECTROPHYSIOLOGY STUDY AND ABLATION      his ablation    CATARACT EXTRACTION, BILATERAL Bilateral 10/01/2020    COLONOSCOPY      CT NEEDLE BIOPSY LUNG  2020    CYSTOSCOPY      FRACTURE SURGERY      LIPOMA RESECTION      LUNG CANCER SURGERY      ORTHOPEDIC SURGERY      ROTATOR CUFF REPAIR         Family History   Adopted: Yes   Problem Relation Age of Onset    No Known Problems Mother     No Known Problems Father        Social History     Occupational History    Occupation: retired   Tobacco Use    Smoking status: Former     Current packs/day: 0.00     Average packs/day: 3.0 packs/day for 25.0 years (75.0 ttl pk-yrs)     Types: Cigarettes, Cigars     Start date: 1973     Quit date: 1998     Years since quittin.6    Smokeless tobacco: Never   Vaping Use    Vaping status: Never Used   Substance and Sexual Activity    Alcohol use: Yes     Alcohol/week: 14.0 standard drinks of alcohol     Types: 3 Glasses of wine, 7 Cans of beer, 4 Shots of liquor per week     Comment: with dinner    Drug use: Never    Sexual activity: Not Currently     Partners: Male         Current Outpatient Medications:     albuterol (PROVENTIL HFA,VENTOLIN HFA) 90 mcg/act inhaler, Inhale 1 puff every 4 (four) hours as needed for wheezing, Disp: 1 Inhaler, Rfl: 6    amLODIPine (NORVASC) 10 mg tablet, Take 1 tablet (10 mg total) by mouth daily, Disp: , Rfl:     amLODIPine (NORVASC) 5 mg tablet, Take 5 mg by mouth daily, Disp: , Rfl:     atorvastatin (LIPITOR) 10 mg tablet, Take 1 tablet (10 mg total) by mouth daily, Disp: 90 tablet, Rfl: 3    Azelastine HCl 137 MCG/SPRAY SOLN, 2 sprays into each nostril 2 (two) times a day, Disp: 30 mL, Rfl: 3    Diclofenac Sodium (VOLTAREN) 1 %, APPLY 2 GRAMS TO THE AFFECTED AREA(S) BY TOPICAL ROUTE 4 TIMES PER DAY, Disp: , Rfl:     dicyclomine (BENTYL) 20 mg  tablet, , Disp: , Rfl:     DULoxetine (CYMBALTA) 60 mg delayed release capsule, Take 60 mg by mouth daily, Disp: , Rfl:     fluticasone (FLONASE) 50 mcg/act nasal spray, 1 spray into each nostril 2 (two) times a day, Disp: 11.1 mL, Rfl: 3    gabapentin (NEURONTIN) 300 mg capsule, Take 300 mg by mouth 2 (two) times a day, Disp: , Rfl:     glycopyrrolate (ROBINUL) 1 mg tablet, Take 1 mg by mouth as needed in the morning and 1 mg as needed in the evening., Disp: , Rfl:     levocetirizine (XYZAL) 5 MG tablet, Take 5 mg by mouth, Disp: , Rfl:     levothyroxine 100 mcg tablet, , Disp: , Rfl:     lisinopril (ZESTRIL) 20 mg tablet, TAKE 1 TABLET BY MOUTH TWICE DAILY (OFFICE VISIT NEEDED PRIOR TO NEXT REFILL), Disp: 60 tablet, Rfl: 4    meloxicam (MOBIC) 15 mg tablet, Take 1 tablet by mouth daily, Disp: , Rfl:     metoprolol tartrate (LOPRESSOR) 50 mg tablet, 1 tab twice a day, Disp: 180 tablet, Rfl: 3    nitroglycerin (Nitrostat) 0.4 mg SL tablet, Place 1 tablet (0.4 mg total) under the tongue every 5 (five) minutes as needed for chest pain, Disp: 25 tablet, Rfl: 3    olopatadine (PATANOL) 0.1 % ophthalmic solution, Administer 1 drop to both eyes 2 (two) times a day, Disp: , Rfl:     pantoprazole (PROTONIX) 40 mg tablet, Take 1 tablet (40 mg total) by mouth daily, Disp: 30 tablet, Rfl: 1    PEG 3350-KCl-NaBcb-NaCl-NaSulf (PEG-3350/Electrolytes) 236 g SOLR, , Disp: , Rfl:     predniSONE 5 mg tablet, Take by mouth as needed , Disp: , Rfl:     rOPINIRole (REQUIP) 0.5 mg tablet, Take 1 tablet (0.5 mg total) by mouth daily at bedtime, Disp: 90 tablet, Rfl: 3    tamsulosin (FLOMAX) 0.4 mg, TAKE 1 CAPSULE BY MOUTH ONCE DAILY WITH  DINNER, Disp: 90 capsule, Rfl: 3    torsemide (DEMADEX) 20 mg tablet, Take 1 tablet by mouth once daily, Disp: 30 tablet, Rfl: 5    Trelegy Ellipta 200-62.5-25 MCG/ACT AEPB inhaler, Inhale 1 puff daily, Disp: , Rfl:     amiodarone 200 mg tablet, Take 1 tablet (200 mg total) by mouth daily, Disp: 90  "tablet, Rfl: 3    apixaban (ELIQUIS) 5 mg, Take 1 tablet (5 mg total) by mouth 2 (two) times a day, Disp: 60 tablet, Rfl: 11    donepezil (ARICEPT) 10 mg tablet, Take 1 tablet (10 mg total) by mouth daily at bedtime, Disp: 90 tablet, Rfl: 3    montelukast (SINGULAIR) 10 mg tablet, Take 1 tablet (10 mg total) by mouth daily, Disp: 90 tablet, Rfl: 3    Allergies   Allergen Reactions    Pollen Extract Itching and Sneezing     Pollens - sneezing, cough, watery eyes      Other Reaction(s): Unknown       Physical Exam:    /74   Pulse (!) 52   Ht 5' 6\" (1.676 m)   Wt 82.6 kg (182 lb)   BMI 29.38 kg/m²     Constitutional:normal, well developed, well nourished, alert, in no distress and non-toxic and no overt pain behavior.  Eyes:anicteric  HEENT:grossly intact  Neck:supple, symmetric, trachea midline and no masses   Pulmonary:even and unlabored  Cardiovascular:No edema or pitting edema present  Skin:Normal without rashes or lesions and well hydrated  Psychiatric:Mood and affect appropriate  Neurologic:Cranial Nerves II-XII grossly intact  Musculoskeletal:antalgic gait    Lumbar Spine Exam    Appearance:  Normal lordosis  Palpation/Tenderness:  left lumbar paraspinal tenderness  right lumbar paraspinal tenderness  left sacroiliac joint tenderness  right sacroiliac joint tenderness  Special Tests:  Left Straight Leg Test:  positive  Right Straight Leg Test:  positive  Left Bret's Maneuver:  positive  Right Bret's Maneuver:  positive  Left Gaenslen's Test:  positive  Right Gaenslen's Test:  positive  Left Pelvic Distraction Test:  positive  Right Pelvic Distraction Test:  positive    Imaging  FL spine and pain procedure    (Results Pending)         Orders Placed This Encounter   Procedures    FL spine and pain procedure       This document was created using speech voice recognition software.   Grammatical errors, random word insertions, pronoun errors, and incomplete sentences are an occasional consequence of " this system due to software limitations, ambient noise, and hardware issues.   Any formal questions or concerns about content, text, or information contained within the body of this dictation should be directly addressed to the provider for clarification.

## 2024-08-07 NOTE — PROGRESS NOTES
Pain Medicine Follow-Up Note    Assessment:  1. Foraminal stenosis of lumbar region    2. Lumbar radiculopathy    3. Sacroiliitis (HCC)        Plan:  Orders Placed This Encounter   Procedures    FL spine and pain procedure     Standing Status:   Future     Standing Expiration Date:   8/8/2028     Order Specific Question:   Reason for Exam:     Answer:   Bilateral L5 TFESI     Order Specific Question:   Anticoagulant hold needed?     Answer:   yes     My impressions and treatment recommendations were discussed in detail with the patient who verbalized understanding and had no further questions.      Patient returns the office with worsening bilateral low back pain that radiates down his left leg.  In the past the patient has had a lumbar epidural steroid injection as well as a transforaminal epidural steroid injection that the patient reports provided him approximately 14 months of pain relief.  Patient is wishing to be reevaluated for another injection.  On exam the patient does have bilateral sacroiliitis which is reproducible with multiple provocative maneuvers but also has lumbar radiculopathy bilaterally on exam.  I recommend that the patient have a bilateral L5 transforaminal epidural steroid injection.  If the patient's pain does not significantly decrease 2 weeks after the injection I would recommend that he have bilateral sacroiliac joint injections. Complete risks and benefits including bleeding, infection, tissue reaction, nerve injury and allergic reaction were discussed. The approach was demonstrated using models and literature was provided. Verbal and written consent was obtained.    Discussed oral medication options however patient declines any additional medications.    Follow-up is planned in 4 weeks after injection time or sooner as warranted.  Discharge instructions were provided. I personally saw and examined the patient and I agree with the above discussed plan of care.    History of Present  Illness:    Ravi Gregory is a 74 y.o. male who presents to [facility] for interval re-evaluation of the above stated pain complaints. The patient has a past medical and chronic pain history as outlined in the assessment section. He was last seen on 3/16/2023.    At today's visit patient states that their pain symptoms are the same with a pain score of 9/10 on the verbal numeric pain scale.  The patient was last seen on 3/16/2024 at that time he had a transforaminal epidural steroid injection that he reported excellent pain relief that lasted greater than a year.  The patient's pain is worse in the morning, evening, and at night.  The patient's pain is constant in nature.  And the quality of the patient's pain is described as sharp and numbness.  The patient's pain is located in the bilateral low back and left lower leg.     Other than as stated above, the patient denies any interval changes in medications, medical condition, mental condition, symptoms, or allergies since the last office visit.       Review of Systems:    Review of Systems   Respiratory:  Negative for shortness of breath.    Cardiovascular:  Negative for chest pain.   Gastrointestinal:  Negative for constipation, diarrhea, nausea and vomiting.   Musculoskeletal:  Positive for back pain, gait problem and myalgias. Negative for arthralgias and joint swelling.        DROM   Skin:  Negative for rash.   Neurological:  Positive for dizziness and numbness. Negative for seizures and weakness.   All other systems reviewed and are negative.        Past Medical History:   Diagnosis Date    Asthma     Cancer (HCC)     Lung Cancer    CHF (congestive heart failure) (HCC)     Chronic kidney disease     Colon polyp     Disease of thyroid gland     Fatty liver     Heart disease     History of cardiac cath 03/05/2020    mRCA 50%    Hyperlipidemia     Hypertension     Kidney stone     Nonischemic cardiomyopathy (HCC)     last assessed 6/21/17;  resolved 16       Past Surgical History:   Procedure Laterality Date    CARDIAC CATHETERIZATION  2020    mRCA 50%    CARDIAC ELECTROPHYSIOLOGY STUDY AND ABLATION      his ablation    CATARACT EXTRACTION, BILATERAL Bilateral 10/01/2020    COLONOSCOPY      CT NEEDLE BIOPSY LUNG  2020    CYSTOSCOPY      FRACTURE SURGERY      LIPOMA RESECTION      LUNG CANCER SURGERY      ORTHOPEDIC SURGERY      ROTATOR CUFF REPAIR         Family History   Adopted: Yes   Problem Relation Age of Onset    No Known Problems Mother     No Known Problems Father        Social History     Occupational History    Occupation: retired   Tobacco Use    Smoking status: Former     Current packs/day: 0.00     Average packs/day: 3.0 packs/day for 25.0 years (75.0 ttl pk-yrs)     Types: Cigarettes, Cigars     Start date: 1973     Quit date: 1998     Years since quittin.6    Smokeless tobacco: Never   Vaping Use    Vaping status: Never Used   Substance and Sexual Activity    Alcohol use: Yes     Alcohol/week: 14.0 standard drinks of alcohol     Types: 3 Glasses of wine, 7 Cans of beer, 4 Shots of liquor per week     Comment: with dinner    Drug use: Never    Sexual activity: Not Currently     Partners: Male         Current Outpatient Medications:     albuterol (PROVENTIL HFA,VENTOLIN HFA) 90 mcg/act inhaler, Inhale 1 puff every 4 (four) hours as needed for wheezing, Disp: 1 Inhaler, Rfl: 6    amLODIPine (NORVASC) 10 mg tablet, Take 1 tablet (10 mg total) by mouth daily, Disp: , Rfl:     amLODIPine (NORVASC) 5 mg tablet, Take 5 mg by mouth daily, Disp: , Rfl:     atorvastatin (LIPITOR) 10 mg tablet, Take 1 tablet (10 mg total) by mouth daily, Disp: 90 tablet, Rfl: 3    Azelastine HCl 137 MCG/SPRAY SOLN, 2 sprays into each nostril 2 (two) times a day, Disp: 30 mL, Rfl: 3    Diclofenac Sodium (VOLTAREN) 1 %, APPLY 2 GRAMS TO THE AFFECTED AREA(S) BY TOPICAL ROUTE 4 TIMES PER DAY, Disp: , Rfl:     dicyclomine (BENTYL) 20 mg  tablet, , Disp: , Rfl:     DULoxetine (CYMBALTA) 60 mg delayed release capsule, Take 60 mg by mouth daily, Disp: , Rfl:     fluticasone (FLONASE) 50 mcg/act nasal spray, 1 spray into each nostril 2 (two) times a day, Disp: 11.1 mL, Rfl: 3    gabapentin (NEURONTIN) 300 mg capsule, Take 300 mg by mouth 2 (two) times a day, Disp: , Rfl:     glycopyrrolate (ROBINUL) 1 mg tablet, Take 1 mg by mouth as needed in the morning and 1 mg as needed in the evening., Disp: , Rfl:     levocetirizine (XYZAL) 5 MG tablet, Take 5 mg by mouth, Disp: , Rfl:     levothyroxine 100 mcg tablet, , Disp: , Rfl:     lisinopril (ZESTRIL) 20 mg tablet, TAKE 1 TABLET BY MOUTH TWICE DAILY (OFFICE VISIT NEEDED PRIOR TO NEXT REFILL), Disp: 60 tablet, Rfl: 4    meloxicam (MOBIC) 15 mg tablet, Take 1 tablet by mouth daily, Disp: , Rfl:     metoprolol tartrate (LOPRESSOR) 50 mg tablet, 1 tab twice a day, Disp: 180 tablet, Rfl: 3    nitroglycerin (Nitrostat) 0.4 mg SL tablet, Place 1 tablet (0.4 mg total) under the tongue every 5 (five) minutes as needed for chest pain, Disp: 25 tablet, Rfl: 3    olopatadine (PATANOL) 0.1 % ophthalmic solution, Administer 1 drop to both eyes 2 (two) times a day, Disp: , Rfl:     pantoprazole (PROTONIX) 40 mg tablet, Take 1 tablet (40 mg total) by mouth daily, Disp: 30 tablet, Rfl: 1    PEG 3350-KCl-NaBcb-NaCl-NaSulf (PEG-3350/Electrolytes) 236 g SOLR, , Disp: , Rfl:     predniSONE 5 mg tablet, Take by mouth as needed , Disp: , Rfl:     rOPINIRole (REQUIP) 0.5 mg tablet, Take 1 tablet (0.5 mg total) by mouth daily at bedtime, Disp: 90 tablet, Rfl: 3    tamsulosin (FLOMAX) 0.4 mg, TAKE 1 CAPSULE BY MOUTH ONCE DAILY WITH  DINNER, Disp: 90 capsule, Rfl: 3    torsemide (DEMADEX) 20 mg tablet, Take 1 tablet by mouth once daily, Disp: 30 tablet, Rfl: 5    Trelegy Ellipta 200-62.5-25 MCG/ACT AEPB inhaler, Inhale 1 puff daily, Disp: , Rfl:     amiodarone 200 mg tablet, Take 1 tablet (200 mg total) by mouth daily, Disp: 90  "tablet, Rfl: 3    apixaban (ELIQUIS) 5 mg, Take 1 tablet (5 mg total) by mouth 2 (two) times a day, Disp: 60 tablet, Rfl: 11    donepezil (ARICEPT) 10 mg tablet, Take 1 tablet (10 mg total) by mouth daily at bedtime, Disp: 90 tablet, Rfl: 3    montelukast (SINGULAIR) 10 mg tablet, Take 1 tablet (10 mg total) by mouth daily, Disp: 90 tablet, Rfl: 3    Allergies   Allergen Reactions    Pollen Extract Itching and Sneezing     Pollens - sneezing, cough, watery eyes      Other Reaction(s): Unknown       Physical Exam:    /74   Pulse (!) 52   Ht 5' 6\" (1.676 m)   Wt 82.6 kg (182 lb)   BMI 29.38 kg/m²     Constitutional:normal, well developed, well nourished, alert, in no distress and non-toxic and no overt pain behavior.  Eyes:anicteric  HEENT:grossly intact  Neck:supple, symmetric, trachea midline and no masses   Pulmonary:even and unlabored  Cardiovascular:No edema or pitting edema present  Skin:Normal without rashes or lesions and well hydrated  Psychiatric:Mood and affect appropriate  Neurologic:Cranial Nerves II-XII grossly intact  Musculoskeletal:antalgic gait    Lumbar Spine Exam    Appearance:  Normal lordosis  Palpation/Tenderness:  left lumbar paraspinal tenderness  right lumbar paraspinal tenderness  left sacroiliac joint tenderness  right sacroiliac joint tenderness  Special Tests:  Left Straight Leg Test:  positive  Right Straight Leg Test:  positive  Left Bret's Maneuver:  positive  Right Bret's Maneuver:  positive  Left Gaenslen's Test:  positive  Right Gaenslen's Test:  positive  Left Pelvic Distraction Test:  positive  Right Pelvic Distraction Test:  positive    Imaging  FL spine and pain procedure    (Results Pending)         Orders Placed This Encounter   Procedures    FL spine and pain procedure       This document was created using speech voice recognition software.   Grammatical errors, random word insertions, pronoun errors, and incomplete sentences are an occasional consequence of " this system due to software limitations, ambient noise, and hardware issues.   Any formal questions or concerns about content, text, or information contained within the body of this dictation should be directly addressed to the provider for clarification.

## 2024-08-08 ENCOUNTER — PATIENT MESSAGE (OUTPATIENT)
Dept: RADIOLOGY | Facility: CLINIC | Age: 74
End: 2024-08-08

## 2024-08-08 ENCOUNTER — OFFICE VISIT (OUTPATIENT)
Dept: PAIN MEDICINE | Facility: CLINIC | Age: 74
End: 2024-08-08

## 2024-08-08 VITALS
WEIGHT: 182 LBS | DIASTOLIC BLOOD PRESSURE: 74 MMHG | SYSTOLIC BLOOD PRESSURE: 135 MMHG | HEIGHT: 66 IN | HEART RATE: 52 BPM | BODY MASS INDEX: 29.25 KG/M2

## 2024-08-08 DIAGNOSIS — M54.16 LUMBAR RADICULOPATHY: ICD-10-CM

## 2024-08-08 DIAGNOSIS — M48.061 FORAMINAL STENOSIS OF LUMBAR REGION: Primary | ICD-10-CM

## 2024-08-08 DIAGNOSIS — M46.1 SACROILIITIS (HCC): ICD-10-CM

## 2024-08-08 NOTE — PATIENT INSTRUCTIONS
Epidural Steroid Injection, Ambulatory Care   GENERAL INFORMATION:   What do I need to know about an epidural steroid injection?  An epidural steroid injection (BIANCA) is a procedure to inject steroid medicine into the epidural space. The epidural space is between your spinal cord and vertebrae. Steroids reduce inflammation and fluid buildup in your spine that may be causing pain. You may be given pain medicine along with the steroids.   How do I prepare for an BIANCA?  Your healthcare provider will talk to you about how to prepare for your procedure. He will tell you what medicines to take or not take on the day of your procedure. You may need to stop taking blood thinners or other medicines several days before your procedure. You may need to adjust any diabetes medicine you take on the day of your procedure. Steroid medicine can increase your blood sugar level.   What will happen during an BIANCA?   You will be given medicine to numb the procedure area. You will be awake for the procedure, but you will not feel pain. You may also be given medicine to help you relax during the procedure. Contrast liquid will be used to help your healthcare provider see the area better. Tell the healthcare provider if you have ever had an allergic reaction to contrast liquid.    Your healthcare provider may place the needle into your neck area, middle of your back, or tailbone area. He may inject the medicine next to the nerves that are causing your pain. He may instead inject the medicine into a larger area of the epidural space. This helps the medicine spread to more nerves. Your healthcare provider will use a fluoroscope to help guide the needle to the right place. A fluoroscope is a type of x-ray. After the procedure, a bandage will be placed over the injection site to prevent infection.  What are the risks of an BIANCA?  You may have temporary or permanent nerve damage or paralysis. You may have bleeding or develop a serious infection,  such as meningitis (swelling of the brain coverings). An abscess may also develop. You may need surgery to fix the abscess. You may have a seizure, anxiety, or trouble sleeping. If you are a man, you may have temporary erectile dysfunction (not able to have an erection).   CARE AGREEMENT:   You have the right to help plan your care. Learn about your health condition and how it may be treated. Discuss treatment options with your caregivers to decide what care you want to receive. You always have the right to refuse treatment. The above information is an  only. It is not intended as medical advice for individual conditions or treatments. Talk to your doctor, nurse or pharmacist before following any medical regimen to see if it is safe and effective for you.  © 2014 [facility]. Information is for End User's use only and may not be sold, redistributed or otherwise used for commercial purposes. All illustrations and images included in CareNotes® are the copyrighted property of [employer] or [employer].     Sacroiliac Joint Injection   WHAT YOU NEED TO KNOW:   What do I need to know about a sacroiliac joint injection?  A sacroiliac (SI) joint injection is done to diagnose or treat pain from sacroiliac joint syndrome. The pain caused by this syndrome may be felt in your lower back, buttocks, groin, and your thigh.   How do I prepare for an SI injection?  Your healthcare provider will ask you to not take any pain medicine the day of the injection. Ask him if there are any other medicines you should not take. You will need to find someone to drive you home after your procedure.  What will happen during the SI injection?  You will be awake for your injection. You may be given calming medicine if you are anxious.  You will lie on your stomach with a pillow under your abdomen. Your healthcare provider will give you an injection of medicine to numb the area. He may use an x-ray,  ultrasound, or CT scan to find the area to inject. You may also be given an injection of contrast material to help your SI joint show up better. Then, your healthcare provider will inject local anesthesia, antiinflammatory medicine, or both into your SI joint.    Healthcare providers will watch you closely for any problems for up to 30 minutes after your injection. Your healthcare provider will check to see if your pain decreases after the injection.    What are the risks of an SI injection?  You may have some weakness for a short time after your injection. The SI injection can cause bleeding, infection, and pain. It can also cause temporary weakness in your leg and problems urinating. You may have an allergic reaction to the medicine that is injected into your SI joint. Your pain may return and you may need more treatment.  CARE AGREEMENT:   You have the right to help plan your care. Learn about your health condition and how it may be treated. Discuss treatment options with your healthcare providers to decide what care you want to receive. You always have the right to refuse treatment. The above information is an  only. It is not intended as medical advice for individual conditions or treatments. Talk to your doctor, nurse or pharmacist before following any medical regimen to see if it is safe and effective for you.  © Copyright [employer] 2022 Information is for End User's use only and may not be sold, redistributed or otherwise used for commercial purposes. All illustrations and images included in CareNotes® are the copyrighted property of [employer] or [employer]

## 2024-08-08 NOTE — PATIENT COMMUNICATION
Pt is scheduled for TFESI with Dr Savage on 9/3/24    Pt is not diabetic     Pt takes Eliquis, prescribed by Dr Durham with [facility] - pt is aware he will need to stop this medication before his injection and that he will receive a call from the nurses with more detail closer to his injection date    Pt given instructions in office and sent review via [employer] message     Have you completed PT/HEP/Chiro in the past 6 months for dedicated area?  If yes, how long did you complete?  What was the frequency?  Did it provide relief?  If no, reason therapy was not completed?   *pt has had previous pain injections

## 2024-08-12 ENCOUNTER — TELEPHONE (OUTPATIENT)
Dept: RADIOLOGY | Facility: CLINIC | Age: 74
End: 2024-08-12

## 2024-08-12 NOTE — TELEPHONE ENCOUNTER
[place] patient of Dr. Durham    Pt is scheduled for TFESI with Dr Savage on 9/3/24. Pt would need to eliquis 3 days prior.    Please advise if Dr Durham is ok with this hold

## 2024-08-15 NOTE — TELEPHONE ENCOUNTER
Caller: shelby Rodrigues    Doctor: Hussein    Reason for call: pt returning the nurse's call and he was given the below message    Call back#: [phone]    LD Eliquis will be on 8/30

## 2024-09-03 ENCOUNTER — HOSPITAL ENCOUNTER (OUTPATIENT)
Dept: RADIOLOGY | Facility: CLINIC | Age: 74
Discharge: HOME | End: 2024-09-03
Payer: MEDICARE

## 2024-09-03 VITALS
RESPIRATION RATE: 18 BRPM | SYSTOLIC BLOOD PRESSURE: 129 MMHG | DIASTOLIC BLOOD PRESSURE: 80 MMHG | OXYGEN SATURATION: 96 % | HEART RATE: 51 BPM | TEMPERATURE: 97.3 F

## 2024-09-03 DIAGNOSIS — M54.16 LUMBAR RADICULOPATHY: ICD-10-CM

## 2024-09-03 DIAGNOSIS — M48.061 FORAMINAL STENOSIS OF LUMBAR REGION: ICD-10-CM

## 2024-09-03 PROCEDURE — 64483 NJX AA&/STRD TFRM EPI L/S 1: CPT | Performed by: STUDENT IN AN ORGANIZED HEALTH CARE EDUCATION/TRAINING PROGRAM

## 2024-09-03 RX ORDER — METHYLPREDNISOLONE ACETATE 80 MG/ML
80 INJECTION, SUSPENSION INTRA-ARTICULAR; INTRALESIONAL; INTRAMUSCULAR; PARENTERAL; SOFT TISSUE ONCE
Status: COMPLETED | OUTPATIENT
Start: 2024-09-03 | End: 2024-09-03

## 2024-09-03 RX ORDER — BUPIVACAINE HCL/PF 2.5 MG/ML
2 VIAL (ML) INJECTION ONCE
Status: COMPLETED | OUTPATIENT
Start: 2024-09-03 | End: 2024-09-03

## 2024-09-03 RX ADMIN — METHYLPREDNISOLONE ACETATE 80 MG: 80 INJECTION, SUSPENSION INTRA-ARTICULAR; INTRALESIONAL; INTRAMUSCULAR; PARENTERAL; SOFT TISSUE at 09:59

## 2024-09-03 RX ADMIN — Medication 2 ML: at 09:59

## 2024-09-03 RX ADMIN — IOHEXOL 1 ML: 300 INJECTION, SOLUTION INTRAVENOUS at 09:56

## 2024-09-03 NOTE — INTERVAL H&P NOTE
Update: (This section must be completed if the H&P was completed greater than 24 hrs to procedure or admission)    H&P reviewed. After examining the patient, I find no changed to the H&P since it had been written.    Patient re-evaluated. Accept as history and physical.    Umang Savage MD/September 3, 2024/9:41 AM

## 2024-09-03 NOTE — DISCHARGE INSTR - LAB
Epidural Steroid Injection   WHAT YOU NEED TO KNOW:   An epidural steroid injection (BIANCA) is a procedure to inject steroid medicine into the epidural space. The epidural space is between your spinal cord and vertebrae. Steroids reduce inflammation and fluid buildup in your spine that may be causing pain. You may be given pain medicine along with the steroids.          ACTIVITY  Do not drive or operate machinery today.  No strenuous activity today - bending, lifting, etc.  You may resume normal activites starting tomorrow - start slowly and as tolerated.  You may shower today, but no tub baths or hot tubs.  You may have numbness for several hours from the local anesthetic. Please use caution and common sense, especially with weight-bearing activities.    CARE OF THE INJECTION SITE  If you have soreness or pain, apply ice to the area today (20 minutes on/20 minutes off).  Starting tomorrow, you may use warm, moist heat or ice if needed.  You may have an increase or change in your discomfort for 36-48 hours after your treatment.  Apply ice and continue with any pain medication you have been prescribed.  Notify the [facility] if you have any of the following: redness, drainage, swelling, headache, stiff neck or fever above 100°F.    SPECIAL INSTRUCTIONS  Our office will contact you in approximately 14 days for a progress report.    MEDICATIONS  Continue to take all routine medications.  Our office may have instructed you to hold some medications.    As no general anesthesia was used in today's procedure, you should not experience any side effects related to anesthesia.     If you are diabetic, the steroids used in today's injection may temporarily increase your blood sugar levels after the first few days after your injection. Please keep a close eye on your sugars and alert the doctor who manages your diabetes if your sugars are significantly high from your baseline or you are symptomatic.     If you have a  problem specifically related to your procedure, please call our office at [phone].  Problems not related to your procedure should be directed to your primary care physician.    none

## 2025-05-23 NOTE — PROGRESS NOTES
Pain Medicine Follow-Up Note    Assessment:  No diagnosis found.    Plan:  No orders of the defined types were placed in this encounter.      No orders of the defined types were placed in this encounter.      My impressions and treatment recommendations were discussed in detail with the patient who verbalized understanding and had no further questions.      ***    {PDMP Statement:21591}    {UDS Statement:43260}    {Opioid Statement:82756}    {Oral Swab Statement:38261}    {Pain Management Procedure Risk Statement:06263}      Follow-up is planned in *** time or sooner as warranted.  Discharge instructions were provided. I personally saw and examined the patient and I agree with the above discussed plan of care.    History of Present Illness:    Ravi Gregory is a 75 y.o. male who presents to [facility] for interval re-evaluation of the above stated pain complaints. The patient has a past medical and chronic pain history as outlined in the assessment section. {He/she (caps):79543} was last seen on ***.    At today's visit patient states that their pain symptoms are *** with a pain score of ***/10 on the verbal numeric pain scale.  The patient's pain is worse ***.  The patient's pain is *** in nature.  And the quality of the patient's pain is described as ***.  The patient's pain is located in the ***.      Pain Contract Signed:  ***  Last Urine Drug Screen:  ***  New Urine Drug Screen: ***  Last dose of opioid medication:  ***    Other than as stated above, the patient denies any interval changes in medications, medical condition, mental condition, symptoms, or allergies since the last office visit.         Review of Systems:    Review of Systems   Respiratory:  Positive for shortness of breath.    Musculoskeletal:  Positive for arthralgias, back pain and gait problem.   Neurological:  Positive for dizziness and weakness.   Psychiatric/Behavioral:  Positive for decreased concentration.   "        Past Medical History[1]    Past Surgical History[2]    Family History[3]    Social History     Occupational History    Occupation: retired   Tobacco Use    Smoking status: Former     Current packs/day: 0.00     Average packs/day: 3.0 packs/day for 25.0 years (75.0 ttl pk-yrs)     Types: Cigarettes, Cigars     Start date: 1973     Quit date: 1998     Years since quittin.4    Smokeless tobacco: Never   Vaping Use    Vaping status: Never Used   Substance and Sexual Activity    Alcohol use: Yes     Alcohol/week: 14.0 standard drinks of alcohol     Types: 3 Glasses of wine, 7 Cans of beer, 4 Shots of liquor per week     Comment: with dinner    Drug use: Never    Sexual activity: Not Currently     Partners: Male       Current Medications[4]    Allergies[5]    Physical Exam:    There were no vitals taken for this visit.    Constitutional:{General Appearance:47767::\"normal, well developed, well nourished, alert, in no distress and non-toxic and no overt pain behavior.\"}  Eyes:{Sclera:47723::\"anicteric\"}  HEENT:{Hearin::\"grossly intact\"}  Neck:{Neck:87984::\"supple, symmetric, trachea midline and no masses \"}  Pulmonary:{Respiratory effort:13787::\"even and unlabored\"}  Cardiovascular:{Examination of Extremities:78509::\"No edema or pitting edema present\"}  Skin:{Skin and Subcutaneous tissues:98055::\"Normal without rashes or lesions and well hydrated\"}  Psychiatric:{Mood and Affect:36893::\"Mood and affect appropriate\"}  Neurologic:{Cranial Nerves:89383::\"Cranial Nerves II-XII grossly intact\"}  Musculoskeletal:{[place]:60295::\"normal\"}      Imaging  No orders to display         No orders of the defined types were placed in this encounter.      This document was created using speech voice recognition software.   Grammatical errors, random word insertions, pronoun errors, and incomplete sentences are an occasional consequence of this system due to software limitations, ambient noise, and " hardware issues.   Any formal questions or concerns about content, text, or information contained within the body of this dictation should be directly addressed to the provider for clarification.         [1]   Past Medical History:  Diagnosis Date    Asthma     Cancer (HCC)     Lung Cancer    CHF (congestive heart failure) (HCC)     Chronic kidney disease     Colon polyp     Disease of thyroid gland     Fatty liver     Heart disease     History of cardiac cath 03/05/2020    mRCA 50%    Hyperlipidemia     Hypertension     Kidney stone     Nonischemic cardiomyopathy (HCC)     last assessed 6/21/17; resolved 9/27/16   [2]   Past Surgical History:  Procedure Laterality Date    CARDIAC CATHETERIZATION  03/05/2020    mRCA 50%    CARDIAC ELECTROPHYSIOLOGY STUDY AND ABLATION      his ablation    CATARACT EXTRACTION, BILATERAL Bilateral 10/01/2020    COLONOSCOPY      CT NEEDLE BIOPSY LUNG  01/02/2020    CYSTOSCOPY      FRACTURE SURGERY      LIPOMA RESECTION      LUNG CANCER SURGERY      ORTHOPEDIC SURGERY      ROTATOR CUFF REPAIR     [3]   Family History  Adopted: Yes   Problem Relation Name Age of Onset    No Known Problems Mother      No Known Problems Father     [4]   Current Outpatient Medications:     albuterol (PROVENTIL HFA,VENTOLIN HFA) 90 mcg/act inhaler, Inhale 1 puff every 4 (four) hours as needed for wheezing, Disp: 1 Inhaler, Rfl: 6    amiodarone 200 mg tablet, Take 1 tablet (200 mg total) by mouth daily, Disp: 90 tablet, Rfl: 3    amLODIPine (NORVASC) 10 mg tablet, Take 1 tablet (10 mg total) by mouth daily, Disp: , Rfl:     amLODIPine (NORVASC) 5 mg tablet, Take 5 mg by mouth daily, Disp: , Rfl:     apixaban (ELIQUIS) 5 mg, Take 1 tablet (5 mg total) by mouth 2 (two) times a day, Disp: 60 tablet, Rfl: 11    atorvastatin (LIPITOR) 10 mg tablet, Take 1 tablet (10 mg total) by mouth daily, Disp: 90 tablet, Rfl: 3    Azelastine HCl 137 MCG/SPRAY SOLN, 2 sprays into each nostril 2 (two) times a day, Disp: 30 mL,  Rfl: 3    Diclofenac Sodium (VOLTAREN) 1 %, APPLY 2 GRAMS TO THE AFFECTED AREA(S) BY TOPICAL ROUTE 4 TIMES PER DAY, Disp: , Rfl:     dicyclomine (BENTYL) 20 mg tablet, , Disp: , Rfl:     donepezil (ARICEPT) 10 mg tablet, Take 1 tablet (10 mg total) by mouth daily at bedtime, Disp: 90 tablet, Rfl: 3    DULoxetine (CYMBALTA) 60 mg delayed release capsule, Take 60 mg by mouth daily, Disp: , Rfl:     fluticasone (FLONASE) 50 mcg/act nasal spray, 1 spray into each nostril 2 (two) times a day, Disp: 11.1 mL, Rfl: 3    gabapentin (NEURONTIN) 300 mg capsule, Take 300 mg by mouth 2 (two) times a day, Disp: , Rfl:     glycopyrrolate (ROBINUL) 1 mg tablet, Take 1 mg by mouth as needed in the morning and 1 mg as needed in the evening., Disp: , Rfl:     levocetirizine (XYZAL) 5 MG tablet, Take 5 mg by mouth, Disp: , Rfl:     levothyroxine 100 mcg tablet, , Disp: , Rfl:     lisinopril (ZESTRIL) 20 mg tablet, TAKE 1 TABLET BY MOUTH TWICE DAILY (OFFICE VISIT NEEDED PRIOR TO NEXT REFILL), Disp: 60 tablet, Rfl: 4    meloxicam (MOBIC) 15 mg tablet, Take 1 tablet by mouth daily, Disp: , Rfl:     metoprolol tartrate (LOPRESSOR) 50 mg tablet, 1 tab twice a day, Disp: 180 tablet, Rfl: 3    montelukast (SINGULAIR) 10 mg tablet, Take 1 tablet (10 mg total) by mouth daily, Disp: 90 tablet, Rfl: 3    nitroglycerin (Nitrostat) 0.4 mg SL tablet, Place 1 tablet (0.4 mg total) under the tongue every 5 (five) minutes as needed for chest pain, Disp: 25 tablet, Rfl: 3    olopatadine (PATANOL) 0.1 % ophthalmic solution, Administer 1 drop to both eyes 2 (two) times a day, Disp: , Rfl:     pantoprazole (PROTONIX) 40 mg tablet, Take 1 tablet (40 mg total) by mouth daily, Disp: 30 tablet, Rfl: 1    PEG 3350-KCl-NaBcb-NaCl-NaSulf (PEG-3350/Electrolytes) 236 g SOLR, , Disp: , Rfl:     predniSONE 5 mg tablet, Take by mouth as needed , Disp: , Rfl:     rOPINIRole (REQUIP) 0.5 mg tablet, Take 1 tablet (0.5 mg total) by mouth daily at bedtime, Disp: 90 tablet,  Rfl: 3    tamsulosin (FLOMAX) 0.4 mg, TAKE 1 CAPSULE BY MOUTH ONCE DAILY WITH  DINNER, Disp: 90 capsule, Rfl: 3    torsemide (DEMADEX) 20 mg tablet, Take 1 tablet by mouth once daily, Disp: 30 tablet, Rfl: 5    Trelegy Ellipta 200-62.5-25 MCG/ACT AEPB inhaler, Inhale 1 puff daily, Disp: , Rfl:   [5]   Allergies  Allergen Reactions    Pollen Extract Itching and Sneezing     Pollens - sneezing, cough, watery eyes      Other Reaction(s): Unknown

## 2025-05-27 ENCOUNTER — PATIENT MESSAGE (OUTPATIENT)
Dept: PAIN MEDICINE | Facility: CLINIC | Age: 75
End: 2025-05-27

## 2025-05-27 ENCOUNTER — TELEPHONE (OUTPATIENT)
Dept: PAIN MEDICINE | Facility: CLINIC | Age: 75
End: 2025-05-27

## 2025-05-27 ENCOUNTER — OFFICE VISIT (OUTPATIENT)
Dept: PAIN MEDICINE | Facility: CLINIC | Age: 75
End: 2025-05-27
Payer: MEDICARE

## 2025-05-27 VITALS — WEIGHT: 182 LBS | BODY MASS INDEX: 29.25 KG/M2 | HEIGHT: 66 IN

## 2025-05-27 DIAGNOSIS — G62.9 NEUROPATHY: ICD-10-CM

## 2025-05-27 DIAGNOSIS — M54.16 LUMBAR RADICULOPATHY: Primary | ICD-10-CM

## 2025-05-27 DIAGNOSIS — M48.061 FORAMINAL STENOSIS OF LUMBAR REGION: ICD-10-CM

## 2025-05-27 PROCEDURE — 99214 OFFICE O/P EST MOD 30 MIN: CPT

## 2025-05-27 RX ORDER — POTASSIUM CHLORIDE 1500 MG/1
20 TABLET, EXTENDED RELEASE ORAL DAILY
COMMUNITY
Start: 2025-02-08

## 2025-05-27 RX ORDER — LEVOFLOXACIN 500 MG/1
500 TABLET, FILM COATED ORAL
COMMUNITY
Start: 2025-02-18

## 2025-05-27 RX ORDER — FLUTICASONE FUROATE AND VILANTEROL 200; 25 UG/1; UG/1
1 POWDER RESPIRATORY (INHALATION) EVERY 24 HOURS
COMMUNITY

## 2025-05-27 RX ORDER — BENZONATATE 100 MG/1
100 CAPSULE ORAL 3 TIMES DAILY PRN
COMMUNITY

## 2025-05-27 RX ORDER — IPRATROPIUM BROMIDE 42 UG/1
1-2 SPRAY, METERED NASAL 4 TIMES DAILY PRN
COMMUNITY
Start: 2025-04-23

## 2025-05-27 RX ORDER — PREGABALIN 25 MG/1
CAPSULE ORAL
Qty: 57 CAPSULE | Refills: 0 | Status: SHIPPED | OUTPATIENT
Start: 2025-05-27 | End: 2025-06-26

## 2025-05-27 RX ORDER — ROFLUMILAST 500 UG/1
500 TABLET ORAL DAILY
COMMUNITY
Start: 2025-03-14 | End: 2026-03-14

## 2025-05-27 RX ORDER — CARVEDILOL 6.25 MG/1
6.25 TABLET ORAL 2 TIMES DAILY WITH MEALS
COMMUNITY
Start: 2025-03-06

## 2025-05-27 RX ORDER — LEVOTHYROXINE SODIUM 137 UG/1
1 TABLET ORAL DAILY
COMMUNITY
Start: 2025-03-25

## 2025-05-27 RX ORDER — LOSARTAN POTASSIUM 100 MG/1
100 TABLET ORAL DAILY
COMMUNITY

## 2025-05-27 NOTE — PATIENT COMMUNICATION
Pt is scheduled for CRISTOBALI with Dr Savage on 6/5/25    Pt is not diabetic    Pt takes eliquis - separate message sent out on day of scheduling regarding hold    Pt given instructions in office and via [employer] message    Have you completed PT/HEP/Chiro in the past 6 months for dedicated area? Chiro with [facility] current, has had previous injections  If yes, how long did you complete?  What was the frequency?  Did it provide relief?  If no, reason therapy was not completed?

## 2025-05-27 NOTE — TELEPHONE ENCOUNTER
Pt is scheduled for LESI with Dr Savage on 6/5/25    Pt takes eliquis - reports it is prescribed by his cardiologist, Dr Durham.    Is aware he will need to hold and will get a call with further instructions.

## 2025-05-27 NOTE — PATIENT INSTRUCTIONS
Patient Education     Pregabalin (pre JOSEFINA a apolinar)   Brand Names: US Lyrica; Lyrica CR   Brand Names: [place] ACH-Pregabalin; AG-Pregabalin; APO-Pregabalin; Auro-Pregabalin; DOM-Pregabalin; JAMP-Pregabalin; Lyrica; M-Pregabalin; Mar-Pregabalin; MINT-Pregabalin; NATALIA-Pregabalin; NRA-Pregabalin; PMS-Pregabalin; LOWELL-Pregabalin; SANDOZ Pregabalin; TARO-Pregabalin; TEVA-Pregabalin   What is this drug used for?   It is used to help control certain kinds of seizures.  It is used to treat painful nerve diseases.  It is used to treat fibromyalgia.  It may be given to you for other reasons. Talk with the doctor.  What do I need to tell my doctor BEFORE I take this drug?   If you are allergic to this drug; any part of this drug; or any other drugs, foods, or substances. Tell your doctor about the allergy and what signs you had.  If you have kidney disease.  If you are breast-feeding. Do not breast-feed while you take this drug.  This is not a list of all drugs or health problems that interact with this drug.  Tell your doctor and pharmacist about all of your drugs (prescription or OTC, natural products, vitamins) and health problems. You must check to make sure that it is safe for you to take this drug with all of your drugs and health problems. Do not start, stop, or change the dose of any drug without checking with your doctor.  What are some things I need to know or do while I take this drug?   Tell all of your health care providers that you take this drug. This includes your doctors, nurses, pharmacists, and dentists.  Avoid driving and doing other tasks or actions that call for you to be alert or have clear eyesight until you see how this drug affects you.  If seizures are different or worse after starting this drug, talk with the doctor.  Do not stop taking this drug all of a sudden without calling your doctor. You may have a greater risk of side effects. If you need to stop this drug, you will want to slowly stop it as  ordered by your doctor.  Avoid drinking alcohol while taking this drug.  Talk with your doctor before you use marijuana, other forms of cannabis, or prescription or OTC drugs that may slow your actions.  A very bad reaction called angioedema has happened with this drug. Sometimes, this may be life-threatening. Signs may include swelling of the hands, face, lips, eyes, tongue, or throat; trouble breathing; trouble swallowing; or unusual hoarseness. Get medical help right away if you have any of these signs.  Severe breathing problems have happened with this drug in people taking certain other drugs (like opioid pain drugs). This has also happened in people who already have lung or breathing problems. The risk may also be greater in people who are older than 65. Sometimes, breathing problems have been deadly. If you have questions, talk with the doctor.  If you are 65 or older, use this drug with care. You could have more side effects.  Talk with your doctor if you plan to father a child. This drug made male animals less fertile and caused sperm changes. Birth defects also happened in the young of male animals treated with this drug. It is not known if these problems happen in humans.  Tell your doctor if you are pregnant or plan on getting pregnant. You will need to talk about the benefits and risks of using this drug while you are pregnant.  What are some side effects that I need to call my doctor about right away?   WARNING/CAUTION: Even though it may be rare, some people may have very bad and sometimes deadly side effects when taking a drug. Tell your doctor or get medical help right away if you have any of the following signs or symptoms that may be related to a very bad side effect:  Signs of an allergic reaction, like rash; hives; itching; red, swollen, blistered, or peeling skin with or without fever; wheezing; tightness in the chest or throat; trouble breathing, swallowing, or talking; unusual hoarseness; or  swelling of the mouth, face, lips, tongue, or throat.  Change in eyesight.  Muscle pain or weakness.  Change in balance.  Feeling confused.  Shakiness.  Trouble breathing, slow breathing, or shallow breathing.  Blue or gray color of the skin, lips, nail beds, fingers, or toes.  Memory problems or loss.  Shortness of breath, a big weight gain, or swelling in the arms or legs.  Fast or abnormal heartbeat.  Fever, chills, or sore throat.  Skin sores.  Any skin change.  Trouble speaking.  Trouble sleeping.  Trouble walking.  Feeling high (easy laughing and feeling good).  Twitching.  Get medical help right away if you feel very sleepy, very dizzy, or if you pass out. Caregivers or others need to get medical help right away if the patient does not respond, does not answer or react like normal, or will not wake up.  Like other drugs that may be used for seizures, this drug may rarely raise the risk of suicidal thoughts or actions. The risk may be higher in people who have had suicidal thoughts or actions in the past. Call the doctor right away about any new or worse signs like depression; feeling nervous, restless, or grouchy; panic attacks; or other changes in mood or behavior. Call the doctor right away if any suicidal thoughts or actions occur.  Low platelet counts have rarely happened with this drug. This may lead to a higher chance of bleeding. Call your doctor right away if you have any unexplained bruising or bleeding.  What are some other side effects of this drug?   All drugs may cause side effects. However, many people have no side effects or only have minor side effects. Call your doctor or get medical help if any of these side effects or any other side effects bother you or do not go away:  Feeling dizzy, sleepy, tired, or weak.  Weight gain.  Not able to focus.  Headache.  Dry mouth.  Constipation.  Increased appetite.  Upset stomach.  Joint pain.  Nose or throat irritation.  These are not all of the side  effects that may occur. If you have questions about side effects, call your doctor. Call your doctor for medical advice about side effects.  You may report side effects to your national health agency.  You may report side effects to the FDA at 1-660.325.5037. You may also report side effects at https://www.fda.gov/medwatch.  How is this drug best taken?   Use this drug as ordered by your doctor. Read all information given to you. Follow all instructions closely.  Extended-release tablets:   Take after the evening meal if taking once daily.  Swallow whole. Do not chew, break, or crush.  Keep taking this drug as you have been told by your doctor or other health care provider, even if you feel well.  Capsules and oral solution:   Take with or without food.  Keep taking this drug as you have been told by your doctor or other health care provider, even if you feel well.  Oral solution:   Measure liquid doses carefully. Use the measuring device that comes with this drug. If there is none, ask the pharmacist for a device to measure this drug.  What do I do if I miss a dose?   Extended-release tablets:   Take a missed dose just before bedtime after eating a snack or after the next day's morning meal.  If you miss taking the missed dose after the next day's morning meal, skip the missed dose and go back to your normal time.  Do not take 2 doses at the same time or extra doses.  Capsules and oral solution:   Take a missed dose as soon as you think about it.  If it is close to the time for your next dose, skip the missed dose and go back to your normal time.  Do not take 2 doses at the same time or extra doses.  How do I store and/or throw out this drug?   Store in the original container at room temperature.  Store in a dry place. Do not store in a bathroom.  Store this drug in a safe place where children cannot see or reach it, and where other people cannot get to it. A locked box or area may help keep this drug safe. Keep  all drugs away from pets.  Throw away unused or  drugs. Do not flush down a toilet or pour down a drain unless you are told to do so. Check with your pharmacist if you have questions about the best way to throw out drugs. There may be drug take-back programs in your area.  General drug facts   If your symptoms or health problems do not get better or if they become worse, call your doctor.  Do not share your drugs with others and do not take anyone else's drugs.  Some drugs may have another patient information leaflet. If you have any questions about this drug, please talk with your doctor, nurse, pharmacist, or other health care provider.  This drug comes with an extra patient fact sheet called a Medication Guide. Read it with care. Read it again each time this drug is refilled. If you have any questions about this drug, please talk with the doctor, pharmacist, or other health care provider.  If you think there has been an overdose, call your poison control center or get medical care right away. Be ready to tell or show what was taken, how much, and when it happened.  Consumer Information Use and Disclaimer   This generalized information is a limited summary of diagnosis, treatment, and/or medication information. It is not meant to be comprehensive and should be used as a tool to help the user understand and/or assess potential diagnostic and treatment options. It does NOT include all information about conditions, treatments, medications, side effects, or risks that may apply to a specific patient. It is not intended to be medical advice or a substitute for the medical advice, diagnosis, or treatment of a health care provider based on the health care provider's examination and assessment of a patient's specific and unique circumstances. Patients must speak with a health care provider for complete information about their health, medical questions, and treatment options, including any risks or benefits  regarding use of medications. This information does not endorse any treatments or medications as safe, effective, or approved for treating a specific patient. Up[employer], Inc[employer] and its affiliates disclaim any warranty or liability relating to this information or the use thereof. The use of this information is governed by the Terms of Use, available at https://www.[employer].com/en/know/clinical-effectiveness-terms.  Last Reviewed Date   2023-12-21  Copyright   © 2024 Up[employer], Inc. and its affiliates and/or licensors. All rights reserved.     Epidural Steroid Injection, Ambulatory Care   GENERAL INFORMATION:   What do I need to know about an epidural steroid injection?  An epidural steroid injection (BIANCA) is a procedure to inject steroid medicine into the epidural space. The epidural space is between your spinal cord and vertebrae. Steroids reduce inflammation and fluid buildup in your spine that may be causing pain. You may be given pain medicine along with the steroids.   How do I prepare for an BIANCA?  Your healthcare provider will talk to you about how to prepare for your procedure. He will tell you what medicines to take or not take on the day of your procedure. You may need to stop taking blood thinners or other medicines several days before your procedure. You may need to adjust any diabetes medicine you take on the day of your procedure. Steroid medicine can increase your blood sugar level.   What will happen during an BIANCA?   You will be given medicine to numb the procedure area. You will be awake for the procedure, but you will not feel pain. You may also be given medicine to help you relax during the procedure. Contrast liquid will be used to help your healthcare provider see the area better. Tell the healthcare provider if you have ever had an allergic reaction to contrast liquid.    Your healthcare provider may place the needle into your neck area, middle of your back, or tailbone area. He may inject the  medicine next to the nerves that are causing your pain. He may instead inject the medicine into a larger area of the epidural space. This helps the medicine spread to more nerves. Your healthcare provider will use a fluoroscope to help guide the needle to the right place. A fluoroscope is a type of x-ray. After the procedure, a bandage will be placed over the injection site to prevent infection.  What are the risks of an BIANCA?  You may have temporary or permanent nerve damage or paralysis. You may have bleeding or develop a serious infection, such as meningitis (swelling of the brain coverings). An abscess may also develop. You may need surgery to fix the abscess. You may have a seizure, anxiety, or trouble sleeping. If you are a man, you may have temporary erectile dysfunction (not able to have an erection).   CARE AGREEMENT:   You have the right to help plan your care. Learn about your health condition and how it may be treated. Discuss treatment options with your caregivers to decide what care you want to receive. You always have the right to refuse treatment. The above information is an  only. It is not intended as medical advice for individual conditions or treatments. Talk to your doctor, nurse or pharmacist before following any medical regimen to see if it is safe and effective for you.  © 2014 [employer] Information is for End User's use only and may not be sold, redistributed or otherwise used for commercial purposes. All illustrations and images included in CareNotes® are the copyrighted property of [employer] or [employer].

## 2025-05-30 NOTE — H&P (VIEW-ONLY)
Name: Ravi Gregory      : 1950      MRN: [id number]  Encounter Provider: RAMIRO López  Encounter Date: 2025   Encounter department: [facility] [place]  :  Assessment & Plan  Lumbar radiculopathy  Foraminal stenosis of lumbar region  Neuropathy    Orders:  •  [place] spine and pain procedure; Future  •  pregabalin (LYRICA) 25 mg capsule; Take 1 capsule (25 mg total) by mouth daily at bedtime for 3 days, THEN 1 capsule (25 mg total) 2 (two) times a day for 27 days.      Patient returns to the office following up after having a transforaminal epidural steroid injection on 9/3/2020 for patient reports that he had significant pain relief but only for 20 days, patient states he had multiple medical concerns since then and now would like to follow-up due to his pain becoming more severe.  Patient has pain across his bilateral low back and radiating down his bilateral legs.  I recommend the patient have an additional epidural steroid injection but from a alternative approach and interlaminal lumbar epidural steroid injection at L5-S1.  Complete risks and benefits including bleeding, infection, tissue reaction, nerve injury and allergic reaction were discussed. The approach was demonstrated using models and literature was provided. Verbal and written consent was obtained.    Patient states that in the past she has tried gabapentin but it caused side effects and he also did not find it helpful.  I recommend the patient trial pregabalin 25 mg capsule twice daily.  Patient educated on how to use this medication and that he should use it consistently in order for to provide benefit, educated on common side effects such as drowsiness and dizziness.  Patient understands to start taking the medication at bedtime for 3 days first prior to initiating twice a day dosing.    My impressions and treatment recommendations were discussed in detail with the patient who verbalized understanding and had  "no further questions.  Discharge instructions were provided. I personally saw and examined the patient and I agree with the above discussed plan of care. Follow-up is planned in 4 weeks or sooner as warranted. The patient was advised to contact the office should their symptoms worsen in the interim.    History of Present Illness     Ravi Gregory is a 75 y.o. male who presents for a follow up office visit in regards to Back Pain (B/L lower back pain down to b/l legs and ankles ). At today's visit patient states that their pain symptoms are worse with a pain score of 9/10 on the verbal numeric pain scale.  The patient's pain is worse in the morning and in the evening.  The patient's pain is constant in nature.  And the quality of the patient's pain is described as sharp and numbness.  The patient's pain is located in the bilateral low back radiating down his bilateral legs, bilateral lower legs and ankles.      Review of Systems   Respiratory:  Positive for shortness of breath.    Cardiovascular:  Negative for chest pain.   Gastrointestinal:  Negative for constipation, diarrhea, nausea and vomiting.   Musculoskeletal:  Positive for back pain and gait problem. Negative for arthralgias, joint swelling and myalgias.        Decreased range of motion, joint stiffness   Skin:  Negative for rash.   Neurological:  Positive for dizziness, weakness and numbness. Negative for seizures.        Memory loss   All other systems reviewed and are negative.      Medical History Reviewed by provider this encounter:  Tobacco  Allergies  Meds  Problems  Med Hx  Surg Hx  Fam Hx     .     Objective   Ht 5' 6\" (1.676 m)   Wt 82.6 kg (182 lb)   BMI 29.38 kg/m²      Pain Score:   9    Constitutional:normal, well developed, well nourished, alert, in no distress and non-toxic and no overt pain behavior.  Eyes:anicteric  HEENT:grossly intact  Neck:supple, symmetric, trachea midline and no masses   Pulmonary:even and " unlabored  Cardiovascular:No edema or pitting edema present  Skin:Normal without rashes or lesions and well hydrated  Psychiatric:Mood and affect appropriate  Neurologic:Cranial Nerves II-XII grossly intact  Musculoskeletal: antalgic gait    This document was created using speech voice recognition software.   Grammatical errors, random word insertions, pronoun errors, and incomplete sentences are an occasional consequence of this system due to software limitations, ambient noise, and hardware issues.   Any formal questions or concerns about content, text, or information contained within the body of this dictation should be directly addressed to the provider for clarification.

## 2025-05-30 NOTE — PROGRESS NOTES
Name: Ravi Gregory      : 1950      MRN: [id number]  Encounter Provider: RAMIRO López  Encounter Date: 2025   Encounter department: [facility] [place]  :  Assessment & Plan  Lumbar radiculopathy  Foraminal stenosis of lumbar region  Neuropathy    Orders:  •  [place] spine and pain procedure; Future  •  pregabalin (LYRICA) 25 mg capsule; Take 1 capsule (25 mg total) by mouth daily at bedtime for 3 days, THEN 1 capsule (25 mg total) 2 (two) times a day for 27 days.      Patient returns to the office following up after having a transforaminal epidural steroid injection on 9/3/2020 for patient reports that he had significant pain relief but only for 20 days, patient states he had multiple medical concerns since then and now would like to follow-up due to his pain becoming more severe.  Patient has pain across his bilateral low back and radiating down his bilateral legs.  I recommend the patient have an additional epidural steroid injection but from a alternative approach and interlaminal lumbar epidural steroid injection at L5-S1.  Complete risks and benefits including bleeding, infection, tissue reaction, nerve injury and allergic reaction were discussed. The approach was demonstrated using models and literature was provided. Verbal and written consent was obtained.    Patient states that in the past she has tried gabapentin but it caused side effects and he also did not find it helpful.  I recommend the patient trial pregabalin 25 mg capsule twice daily.  Patient educated on how to use this medication and that he should use it consistently in order for to provide benefit, educated on common side effects such as drowsiness and dizziness.  Patient understands to start taking the medication at bedtime for 3 days first prior to initiating twice a day dosing.    My impressions and treatment recommendations were discussed in detail with the patient who verbalized understanding and had  "no further questions.  Discharge instructions were provided. I personally saw and examined the patient and I agree with the above discussed plan of care. Follow-up is planned in 4 weeks or sooner as warranted. The patient was advised to contact the office should their symptoms worsen in the interim.    History of Present Illness {?Quick Links Encounters * My Last Note * Last Note in Specialty * Snapshot * Since Last Visit * History :[id number]}    Ravi Gregory is a 75 y.o. male who presents for a follow up office visit in regards to Back Pain (B/L lower back pain down to b/l legs and ankles ). At today's visit patient states that their pain symptoms are worse with a pain score of 9/10 on the verbal numeric pain scale.  The patient's pain is worse in the morning and in the evening.  The patient's pain is constant in nature.  And the quality of the patient's pain is described as sharp and numbness.  The patient's pain is located in the bilateral low back radiating down his bilateral legs, bilateral lower legs and ankles.      Review of Systems   Respiratory:  Positive for shortness of breath.    Cardiovascular:  Negative for chest pain.   Gastrointestinal:  Negative for constipation, diarrhea, nausea and vomiting.   Musculoskeletal:  Positive for back pain and gait problem. Negative for arthralgias, joint swelling and myalgias.        Decreased range of motion, joint stiffness   Skin:  Negative for rash.   Neurological:  Positive for dizziness, weakness and numbness. Negative for seizures.        Memory loss   All other systems reviewed and are negative.      Medical History Reviewed by provider this encounter:  Tobacco  Allergies  Meds  Problems  Med Hx  Surg Hx  Fam Hx     .     Objective {?Quick Links Trend Vitals * Enter New Vitals * Results Review * Timeline (Adult) * Labs * Imaging * Cardiology * Procedures * Lung Cancer Screening * Surgical eConsent :[id number]}  Ht 5' 6\" (1.676 m)   Wt 82.6 kg (182 lb)   BMI " 29.38 kg/m²      Pain Score:   9    Constitutional:normal, well developed, well nourished, alert, in no distress and non-toxic and no overt pain behavior.  Eyes:anicteric  HEENT:grossly intact  Neck:supple, symmetric, trachea midline and no masses   Pulmonary:even and unlabored  Cardiovascular:No edema or pitting edema present  Skin:Normal without rashes or lesions and well hydrated  Psychiatric:Mood and affect appropriate  Neurologic:Cranial Nerves II-XII grossly intact  Musculoskeletal: antalgic gait    This document was created using speech voice recognition software.   Grammatical errors, random word insertions, pronoun errors, and incomplete sentences are an occasional consequence of this system due to software limitations, ambient noise, and hardware issues.   Any formal questions or concerns about content, text, or information contained within the body of this dictation should be directly addressed to the provider for clarification.

## 2025-05-30 NOTE — ASSESSMENT & PLAN NOTE
Orders:  •  [place] spine and pain procedure; Future  •  pregabalin (LYRICA) 25 mg capsule; Take 1 capsule (25 mg total) by mouth daily at bedtime for 3 days, THEN 1 capsule (25 mg total) 2 (two) times a day for 27 days.

## 2025-06-05 ENCOUNTER — HOSPITAL ENCOUNTER (OUTPATIENT)
Dept: RADIOLOGY | Facility: CLINIC | Age: 75
End: 2025-06-05
Payer: MEDICARE

## 2025-06-05 VITALS
OXYGEN SATURATION: 96 % | HEART RATE: 51 BPM | RESPIRATION RATE: 20 BRPM | DIASTOLIC BLOOD PRESSURE: 64 MMHG | SYSTOLIC BLOOD PRESSURE: 130 MMHG

## 2025-06-05 DIAGNOSIS — M54.16 LUMBAR RADICULOPATHY: ICD-10-CM

## 2025-06-05 PROCEDURE — 62323 NJX INTERLAMINAR LMBR/SAC: CPT | Performed by: STUDENT IN AN ORGANIZED HEALTH CARE EDUCATION/TRAINING PROGRAM

## 2025-06-05 RX ORDER — PAPAVERINE HCL 150 MG
20 CAPSULE, EXTENDED RELEASE ORAL ONCE
Status: COMPLETED | OUTPATIENT
Start: 2025-06-05 | End: 2025-06-05

## 2025-06-05 RX ADMIN — DEXAMETHASONE SODIUM PHOSPHATE 20 MG: 10 INJECTION, SOLUTION INTRAMUSCULAR; INTRAVENOUS at 13:12

## 2025-06-05 RX ADMIN — IOHEXOL 1 ML: 300 INJECTION, SOLUTION INTRAVENOUS at 13:12

## 2025-06-05 NOTE — DISCHARGE INSTR - LAB
Epidural Steroid Injection   WHAT YOU NEED TO KNOW:   An epidural steroid injection (BIANCA) is a procedure to inject steroid medicine into the epidural space. The epidural space is between your spinal cord and vertebrae. Steroids reduce inflammation and fluid buildup in your spine that may be causing pain. You may be given pain medicine along with the steroids.          ACTIVITY  Do not drive or operate machinery today.  No strenuous activity today - bending, lifting, etc.  You may resume normal activites starting tomorrow - start slowly and as tolerated.  You may shower today, but no tub baths or hot tubs.  You may have numbness for several hours from the local anesthetic. Please use caution and common sense, especially with weight-bearing activities.    CARE OF THE INJECTION SITE  If you have soreness or pain, apply ice to the area today (20 minutes on/20 minutes off).  Starting tomorrow, you may use warm, moist heat or ice if needed.  You may have an increase or change in your discomfort for 36-48 hours after your treatment.  Apply ice and continue with any pain medication you have been prescribed.  Notify the [facility] if you have any of the following: redness, drainage, swelling, headache, stiff neck or fever above 100°F.    SPECIAL INSTRUCTIONS  Our office will contact you in approximately 14 days for a progress report.    MEDICATIONS  Continue to take all routine medications.  Our office may have instructed you to hold some medications.    As no general anesthesia was used in today's procedure, you should not experience any side effects related to anesthesia.     If you are diabetic, the steroids used in today's injection may temporarily increase your blood sugar levels after the first few days after your injection. Please keep a close eye on your sugars and alert the doctor who manages your diabetes if your sugars are significantly high from your baseline or you are symptomatic.     If you have a  problem specifically related to your procedure, please call our office at [phone].  Problems not related to your procedure should be directed to your primary care physician.

## 2025-06-05 NOTE — INTERVAL H&P NOTE
Update: (This section must be completed if the H&P was completed greater than 24 hrs to procedure or admission)    H&P reviewed. After examining the patient, I find no changed to the H&P since it had been written.    Patient re-evaluated. Accept as history and physical.    Umang Savage MD/June 5, 2025/1:00 PM

## 2025-06-19 ENCOUNTER — TELEPHONE (OUTPATIENT)
Dept: PAIN MEDICINE | Facility: MEDICAL CENTER | Age: 75
End: 2025-06-19